# Patient Record
Sex: FEMALE | Race: WHITE | NOT HISPANIC OR LATINO | Employment: FULL TIME | URBAN - METROPOLITAN AREA
[De-identification: names, ages, dates, MRNs, and addresses within clinical notes are randomized per-mention and may not be internally consistent; named-entity substitution may affect disease eponyms.]

---

## 2018-08-26 ENCOUNTER — OFFICE VISIT (OUTPATIENT)
Dept: FAMILY MEDICINE CLINIC | Facility: CLINIC | Age: 63
End: 2018-08-26
Payer: COMMERCIAL

## 2018-08-26 VITALS
SYSTOLIC BLOOD PRESSURE: 120 MMHG | OXYGEN SATURATION: 97 % | TEMPERATURE: 97.5 F | RESPIRATION RATE: 20 BRPM | DIASTOLIC BLOOD PRESSURE: 68 MMHG | HEIGHT: 64 IN | BODY MASS INDEX: 32.83 KG/M2 | WEIGHT: 192.3 LBS | HEART RATE: 80 BPM

## 2018-08-26 DIAGNOSIS — M16.0 PRIMARY OSTEOARTHRITIS OF BOTH HIPS: ICD-10-CM

## 2018-08-26 DIAGNOSIS — M19.132 POST-TRAUMATIC OSTEOARTHRITIS OF LEFT WRIST: ICD-10-CM

## 2018-08-26 DIAGNOSIS — R19.7 DIARRHEA, UNSPECIFIED TYPE: Primary | ICD-10-CM

## 2018-08-26 DIAGNOSIS — E55.9 VITAMIN D DEFICIENCY: ICD-10-CM

## 2018-08-26 DIAGNOSIS — H81.10 BENIGN PAROXYSMAL VERTIGO, UNSPECIFIED LATERALITY: ICD-10-CM

## 2018-08-26 DIAGNOSIS — Z23 NEED FOR INFLUENZA VACCINATION: ICD-10-CM

## 2018-08-26 DIAGNOSIS — K22.70 BARRETT'S ESOPHAGUS WITHOUT DYSPLASIA: ICD-10-CM

## 2018-08-26 DIAGNOSIS — K58.0 IRRITABLE BOWEL SYNDROME WITH DIARRHEA: ICD-10-CM

## 2018-08-26 DIAGNOSIS — M81.0 AGE-RELATED OSTEOPOROSIS WITHOUT CURRENT PATHOLOGICAL FRACTURE: ICD-10-CM

## 2018-08-26 DIAGNOSIS — E89.0 POSTOPERATIVE HYPOTHYROIDISM: ICD-10-CM

## 2018-08-26 PROCEDURE — 1036F TOBACCO NON-USER: CPT | Performed by: FAMILY MEDICINE

## 2018-08-26 PROCEDURE — 90471 IMMUNIZATION ADMIN: CPT | Performed by: FAMILY MEDICINE

## 2018-08-26 PROCEDURE — 99204 OFFICE O/P NEW MOD 45 MIN: CPT | Performed by: FAMILY MEDICINE

## 2018-08-26 PROCEDURE — 90682 RIV4 VACC RECOMBINANT DNA IM: CPT | Performed by: FAMILY MEDICINE

## 2018-08-26 RX ORDER — OMEPRAZOLE 40 MG/1
40 CAPSULE, DELAYED RELEASE ORAL 2 TIMES DAILY
Refills: 0
Start: 2018-08-26 | End: 2019-05-21 | Stop reason: SDUPTHER

## 2018-08-26 RX ORDER — MECLIZINE HYDROCHLORIDE 25 MG/1
25 TABLET ORAL 3 TIMES DAILY PRN
Qty: 30 TABLET | Refills: 0 | Status: SHIPPED | OUTPATIENT
Start: 2018-08-26 | End: 2021-03-24 | Stop reason: SDUPTHER

## 2018-08-26 RX ORDER — MONTELUKAST SODIUM 4 MG/1
TABLET, CHEWABLE ORAL
Refills: 0
Start: 2018-08-26 | End: 2019-04-12 | Stop reason: SDUPTHER

## 2018-08-26 RX ORDER — MELOXICAM 15 MG/1
15 TABLET ORAL DAILY
Refills: 0
Start: 2018-08-26 | End: 2018-12-15 | Stop reason: SDUPTHER

## 2018-08-26 RX ORDER — LOPERAMIDE HYDROCHLORIDE 2 MG/1
CAPSULE ORAL
Qty: 240 CAPSULE | Refills: 0 | Status: SHIPPED | OUTPATIENT
Start: 2018-08-26 | End: 2018-10-08 | Stop reason: SDUPTHER

## 2018-08-26 RX ORDER — IBUPROFEN 200 MG
1 CAPSULE ORAL 2 TIMES DAILY
COMMUNITY
End: 2021-09-27 | Stop reason: ALTCHOICE

## 2018-08-26 RX ORDER — LEVOTHYROXINE SODIUM 0.12 MG/1
125 TABLET ORAL DAILY
COMMUNITY
End: 2019-05-21 | Stop reason: SDUPTHER

## 2018-08-26 RX ORDER — GABAPENTIN 100 MG/1
200 CAPSULE ORAL
Refills: 0
Start: 2018-08-26 | End: 2020-03-14 | Stop reason: DRUGHIGH

## 2018-08-26 RX ORDER — LEVOTHYROXINE SODIUM 112 UG/1
TABLET ORAL
COMMUNITY
End: 2018-12-22 | Stop reason: SDUPTHER

## 2018-08-26 NOTE — PROGRESS NOTES
Assessment/Plan:  Patient is a 66year old female seen to establish in our office  Diagnoses and all orders for this visit:    Diarrhea, unspecified type  -     colestipol (COLESTID) 1 g tablet; Two tabs with every meal  -     loperamide (IMODIUM) 2 mg capsule; Four tabs twice a day as needed    Postoperative hypothyroidism    Age-related osteoporosis without current pathological fracture    Primary osteoarthritis of both hips  -     meloxicam (MOBIC) 15 mg tablet; Take 1 tablet (15 mg total) by mouth daily  -     gabapentin (NEURONTIN) 100 mg capsule; Take 2 capsules (200 mg total) by mouth daily at bedtime    Post-traumatic osteoarthritis of left wrist    Ocampo's esophagus without dysplasia  -     omeprazole (PRILOSEC) 40 MG capsule; Take 1 capsule (40 mg total) by mouth 2 (two) times a day    Vitamin D deficiency  -     cholecalciferol (VITAMIN D3) 23310 units capsule; Take 2 capsules (20,000 Units total) by mouth 2 (two) times a day  -     Vitamin D 25 hydroxy; Future  -     Vitamin D 25 hydroxy    Irritable bowel syndrome with diarrhea  -     rifaximin (XIFAXAN) 550 mg tablet; Take 1 tablet (550 mg total) by mouth every 8 (eight) hours for 90 days    Benign paroxysmal vertigo, unspecified laterality  -     meclizine (ANTIVERT) 25 mg tablet; Take 1 tablet (25 mg total) by mouth 3 (three) times a day as needed for dizziness    Need for influenza vaccination  -     influenza vaccine, [id number], quadrivalent, recombinant, PF, 0 5 mL, for patients 18 yr+ (FLUBLOK)    Other orders  -     Discontinue: Celecoxib (CELEBREX PO); Celebrex  -     Discontinue: Omeprazole (PRILOSEC PO); Take 80 tablets by mouth 2 (two) times a day  -     levothyroxine (SYNTHROID) 112 mcg tablet; Synthroid  -     Multiple Vitamins-Minerals (MULTIVITAMIN ADULT PO); Take by mouth 2 (two) times a day  -     calcium citrate (CALCITRATE) 950 MG tablet; Take 1 tablet by mouth 2 (two) times a day  -     levothyroxine 125 mcg tablet;  Take 125 mcg by mouth daily      I have spent 50 minutes with Patient  today in which greater than 50% of this time was spent in counseling/coordination of care regarding Impressions and taking lengthy history and also patient discussing her opinion on her medical care  She did request a flu shot at the visit  She goes back and forth to  [place] during the week           Subjective:   Chief Complaint   Patient presents with    new patient here to establish        Patient ID: Judy Baez is a 66 y o  female  Patient is here to establish  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 [employer]  She rosales been seeing Dr Arsh Wong at [facility] and he is retiring  She has a complicated medical history  Has history of thyroid disease - had nodule - family history of thyroid cancer - so had thyroid where nodule was removed - was not cancer  She developed Ocampo's esophagus after gastric sleeve surgery  She has Diverticulosis  Since she had Gallbladder surgery - has loose stools all the time - has been on Xifaxin for three months at a time - has seen Dr Luis Alfredo Mcintyre in the past at [facility] -takes colestid with meals and Imodium 4 tabs BID  Dr Farrukh Billingsley - rheumatology at [facility] - osteoporosis - 3 or 4 years - manages DEXA scan  Fam history - heart disease, thyroid, diabetes  Severe allergy to bees  Had reaction to epinephrine at dentist  She can tolerate it  She says that she does not have a reaction to Ephedrine  This was already in [facility]  There is no recordd in care Everywhere  She also has signinvficant arthritis  The following portions of the patient's history were reviewed and updated as appropriate: allergies, current medications, past family history, past medical history, past social history, past surgical history and problem list     Review of Systems   Constitutional: Positive for unexpected weight change (Weight loss secondary to diarrhea)  Gastrointestinal: Positive for diarrhea     Musculoskeletal: Positive for arthralgias and back pain  Psychiatric/Behavioral: Negative  Objective:      /68 (BP Location: Left arm, Patient Position: Sitting, Cuff Size: Large)   Pulse 80   Temp 97 5 °F (36 4 °C) (Tympanic)   Resp 20   Ht 5' 4 17" (1 63 m)   Wt 87 2 kg (192 lb 4 8 oz)   SpO2 97%   Breastfeeding? No   BMI 32 83 kg/m²          Physical Exam   Constitutional: She is oriented to person, place, and time  Neck: No thyromegaly present  Cardiovascular: Normal rate, regular rhythm and normal heart sounds  /68  Patient says this is high for her  Pulmonary/Chest: Effort normal and breath sounds normal    Lymphadenopathy:     She has no cervical adenopathy  Neurological: She is alert and oriented to person, place, and time  Skin: Skin is warm and dry  Nursing note and vitals reviewed

## 2018-10-08 DIAGNOSIS — R19.7 DIARRHEA, UNSPECIFIED TYPE: ICD-10-CM

## 2018-10-11 RX ORDER — LOPERAMIDE HYDROCHLORIDE 2 MG/1
CAPSULE ORAL
Qty: 240 CAPSULE | Refills: 0 | Status: SHIPPED | OUTPATIENT
Start: 2018-10-11 | End: 2018-11-10 | Stop reason: SDUPTHER

## 2018-11-10 DIAGNOSIS — R19.7 DIARRHEA, UNSPECIFIED TYPE: ICD-10-CM

## 2018-11-11 RX ORDER — LOPERAMIDE HYDROCHLORIDE 2 MG/1
CAPSULE ORAL
Qty: 240 CAPSULE | Refills: 0 | Status: SHIPPED | OUTPATIENT
Start: 2018-11-11 | End: 2019-01-25 | Stop reason: SDUPTHER

## 2018-12-15 DIAGNOSIS — M16.0 PRIMARY OSTEOARTHRITIS OF BOTH HIPS: ICD-10-CM

## 2018-12-16 DIAGNOSIS — R19.7 DIARRHEA, UNSPECIFIED TYPE: ICD-10-CM

## 2018-12-16 RX ORDER — MELOXICAM 15 MG/1
15 TABLET ORAL DAILY
Qty: 90 TABLET | Refills: 0
Start: 2018-12-16 | End: 2018-12-22 | Stop reason: SDUPTHER

## 2018-12-17 RX ORDER — LOPERAMIDE HYDROCHLORIDE 2 MG/1
CAPSULE ORAL
Qty: 240 CAPSULE | Refills: 0 | Status: SHIPPED | OUTPATIENT
Start: 2018-12-17 | End: 2019-01-25 | Stop reason: SDUPTHER

## 2018-12-22 DIAGNOSIS — M16.0 PRIMARY OSTEOARTHRITIS OF BOTH HIPS: ICD-10-CM

## 2018-12-22 DIAGNOSIS — E03.9 HYPOTHYROIDISM, UNSPECIFIED TYPE: Primary | ICD-10-CM

## 2018-12-23 RX ORDER — LEVOTHYROXINE SODIUM 112 UG/1
TABLET ORAL
Qty: 45 TABLET | Refills: 1 | Status: SHIPPED | OUTPATIENT
Start: 2018-12-23 | End: 2019-05-21 | Stop reason: SDUPTHER

## 2018-12-23 RX ORDER — MELOXICAM 15 MG/1
TABLET ORAL
Qty: 90 TABLET | Refills: 1 | Status: SHIPPED | OUTPATIENT
Start: 2018-12-23 | End: 2019-05-21 | Stop reason: SDUPTHER

## 2019-01-24 DIAGNOSIS — R19.7 DIARRHEA, UNSPECIFIED TYPE: ICD-10-CM

## 2019-01-25 RX ORDER — LOPERAMIDE HYDROCHLORIDE 2 MG/1
CAPSULE ORAL
Qty: 240 CAPSULE | Refills: 0 | Status: SHIPPED | OUTPATIENT
Start: 2019-01-25 | End: 2019-02-22 | Stop reason: SDUPTHER

## 2019-02-15 ENCOUNTER — OFFICE VISIT (OUTPATIENT)
Dept: FAMILY MEDICINE CLINIC | Facility: CLINIC | Age: 64
End: 2019-02-15
Payer: COMMERCIAL

## 2019-02-15 VITALS
TEMPERATURE: 99.2 F | SYSTOLIC BLOOD PRESSURE: 122 MMHG | BODY MASS INDEX: 32.73 KG/M2 | OXYGEN SATURATION: 97 % | HEIGHT: 64 IN | DIASTOLIC BLOOD PRESSURE: 84 MMHG | HEART RATE: 87 BPM | WEIGHT: 191.7 LBS

## 2019-02-15 DIAGNOSIS — R31.9 HEMATURIA, UNSPECIFIED TYPE: ICD-10-CM

## 2019-02-15 DIAGNOSIS — R30.0 DYSURIA: ICD-10-CM

## 2019-02-15 DIAGNOSIS — R31.9 URINARY TRACT INFECTION WITH HEMATURIA, SITE UNSPECIFIED: Primary | ICD-10-CM

## 2019-02-15 DIAGNOSIS — N39.0 URINARY TRACT INFECTION WITH HEMATURIA, SITE UNSPECIFIED: Primary | ICD-10-CM

## 2019-02-15 LAB
SL AMB  POCT GLUCOSE, UA: ABNORMAL
SL AMB LEUKOCYTE ESTERASE,UA: ABNORMAL
SL AMB POCT BILIRUBIN,UA: ABNORMAL
SL AMB POCT BLOOD,UA: ABNORMAL
SL AMB POCT CLARITY,UA: CLEAR
SL AMB POCT COLOR,UA: YELLOW
SL AMB POCT KETONES,UA: ABNORMAL
SL AMB POCT NITRITE,UA: ABNORMAL
SL AMB POCT PH,UA: 5
SL AMB POCT SPECIFIC GRAVITY,UA: 1.01
SL AMB POCT URINE PROTEIN: ABNORMAL
SL AMB POCT UROBILINOGEN: 0.2

## 2019-02-15 PROCEDURE — 99213 OFFICE O/P EST LOW 20 MIN: CPT | Performed by: NURSE PRACTITIONER

## 2019-02-15 PROCEDURE — 81003 URINALYSIS AUTO W/O SCOPE: CPT | Performed by: NURSE PRACTITIONER

## 2019-02-15 PROCEDURE — 87077 CULTURE AEROBIC IDENTIFY: CPT | Performed by: NURSE PRACTITIONER

## 2019-02-15 PROCEDURE — 87086 URINE CULTURE/COLONY COUNT: CPT | Performed by: NURSE PRACTITIONER

## 2019-02-15 PROCEDURE — 3008F BODY MASS INDEX DOCD: CPT | Performed by: NURSE PRACTITIONER

## 2019-02-15 PROCEDURE — 87186 SC STD MICRODIL/AGAR DIL: CPT | Performed by: NURSE PRACTITIONER

## 2019-02-15 RX ORDER — GABAPENTIN 300 MG/1
300 CAPSULE ORAL
Refills: 1 | COMMUNITY
Start: 2018-12-22 | End: 2020-03-14 | Stop reason: DRUGHIGH

## 2019-02-15 RX ORDER — NITROFURANTOIN 25; 75 MG/1; MG/1
100 CAPSULE ORAL 2 TIMES DAILY
Qty: 10 CAPSULE | Refills: 0 | Status: SHIPPED | OUTPATIENT
Start: 2019-02-15 | End: 2019-02-20

## 2019-02-15 RX ORDER — SODIUM FLUORIDE 5 MG/G
1 GEL, DENTIFRICE DENTAL
COMMUNITY
Start: 2016-05-26

## 2019-02-15 RX ORDER — ACETAMINOPHEN AND CODEINE PHOSPHATE 300; 30 MG/1; MG/1
TABLET ORAL
COMMUNITY
End: 2021-09-27 | Stop reason: ALTCHOICE

## 2019-02-15 RX ORDER — FLUTICASONE PROPIONATE 50 MCG
SPRAY, SUSPENSION (ML) NASAL AS NEEDED
COMMUNITY
Start: 2012-12-07

## 2019-02-15 RX ORDER — DIPHENOXYLATE HYDROCHLORIDE AND ATROPINE SULFATE 2.5; .025 MG/1; MG/1
1 TABLET ORAL 3 TIMES DAILY PRN
COMMUNITY
Start: 2017-02-15 | End: 2020-03-14 | Stop reason: SDUPTHER

## 2019-02-15 NOTE — PROGRESS NOTES
[facility]    ASSESSMENT AND PLAN     1  Urinary tract infection with hematuria, site unspecified  60-year-old female presents today with signs and symptoms suggestive of a urinary tract infection  Physical assessment is as documented below  In office urine dip positive:  +3 leuks, blood, nitrates  Rx given today for 5 day course of nitrofurantoin  Urine sent for culture  Patient will be contacted should results indicate alternate treatment  Symptom management reviewed: Increase fluids, may take OTC Pyridium, Tylenol if needed  Advised patient to be re-evaluated should her symptoms change, persist and/or worsen  - nitrofurantoin (MACROBID) 100 mg capsule; Take 1 capsule (100 mg total) by mouth 2 (two) times a day for 5 days  Dispense: 10 capsule; Refill: 0    2  Dysuria  As above  - Urine culture    3  Hematuria, unspecified type  As above  - POCT urine dip auto non-scope  - Urine culture            SUBJECTIVE       Patient ID: Thony Palma is a 60 y o  female  Chief Complaint   Patient presents with    Difficulty Urinating     x 1 week       HISTORY OF PRESENT ILLNESS    Patient presents today with urinary track infection symptoms  States she has urinary burning, frequency, suprapubic pain  She denies itching  Denies any discharge  Denies fever  Denies any hematuria  Present for 1 week  She has not taken any medication, but states she has been drinking about 3 gal of water a day  The following portions of the patient's history were reviewed and updated as appropriate: allergies, current medications, past medical history and problem list     REVIEW OF SYSTEMS  Review of Systems   Constitutional: Negative  Genitourinary: Positive for decreased urine volume, dysuria, frequency, pelvic pain and urgency  Negative for difficulty urinating, flank pain, genital sores, hematuria and vaginal discharge  Psychiatric/Behavioral: Negative          OBJECTIVE      VITAL SIGNS  BP 122/84 (BP Location: Left arm, Patient Position: Sitting)   Pulse 87   Temp 99 2 °F (37 3 °C) (Tympanic)   Ht 5' 4" (1 626 m)   Wt 87 kg (191 lb 11 2 oz)   SpO2 97%   BMI 32 91 kg/m²     CURRENT MEDICATIONS    Current Outpatient Medications:     calcium citrate (CALCITRATE) 950 MG tablet, Take 1 tablet by mouth 2 (two) times a day, Disp: , Rfl:     cholecalciferol (VITAMIN D3) 63439 units capsule, Take 2 capsules (20,000 Units total) by mouth 2 (two) times a day, Disp: , Rfl: 0    colestipol (COLESTID) 1 g tablet, Two tabs with every meal, Disp: , Rfl: 0    denosumab (PROLIA) 60 mg/mL, Inject 60 mg under the skin, Disp: , Rfl:     diphenoxylate-atropine (LOMOTIL) 2 5-0 025 mg per tablet, Take 1 tablet by mouth Three times daily as needed, Disp: , Rfl:     fluticasone (FLONASE) 50 mcg/act nasal spray, into each nostril, Disp: , Rfl:     gabapentin (NEURONTIN) 100 mg capsule, Take 2 capsules (200 mg total) by mouth daily at bedtime, Disp: , Rfl: 0    gabapentin (NEURONTIN) 300 mg capsule, Take 300 mg by mouth daily at bedtime, Disp: , Rfl: 1    levothyroxine 112 mcg tablet, TAKE 1 TABLET EVERY OTHER DAY, Disp: 45 tablet, Rfl: 1    levothyroxine 125 mcg tablet, Take 125 mcg by mouth daily, Disp: , Rfl:     loperamide (IMODIUM) 2 mg capsule, Take 4 capsules by mouth twice daily as needed, Disp: 240 capsule, Rfl: 0    meclizine (ANTIVERT) 25 mg tablet, Take 1 tablet (25 mg total) by mouth 3 (three) times a day as needed for dizziness, Disp: 30 tablet, Rfl: 0    meloxicam (MOBIC) 15 mg tablet, TAKE 1 TABLET BY MOUTH IN THE MORNING, Disp: 90 tablet, Rfl: 1    Multiple Vitamins-Minerals (MULTIVITAMIN ADULT PO), Take by mouth 2 (two) times a day, Disp: , Rfl:     omeprazole (PRILOSEC) 40 MG capsule, Take 1 capsule (40 mg total) by mouth 2 (two) times a day, Disp: , Rfl: 0    rifaximin (XIFAXAN) 550 mg tablet, 1 tablet, Disp: , Rfl:     SODIUM FLUORIDE, DENTAL GEL, (PREVIDENT) 1 1 % GEL, Take 1 application by mouth, Disp: , Rfl:     acetaminophen-codeine (TYLENOL/CODEINE #3) 300-30 MG per tablet, Tylenol-Codeine #3 300 mg-30 mg tablet  Take 1-2 tablets by oral route every 4-6 hours as needed FOR POST OP PAIN, Disp: , Rfl:     nitrofurantoin (MACROBID) 100 mg capsule, Take 1 capsule (100 mg total) by mouth 2 (two) times a day for 5 days, Disp: 10 capsule, Rfl: 0      PHYSICAL EXAMINATION   Physical Exam   Constitutional: She appears well-developed and well-nourished  Abdominal:   Patient with stated pain in her suprapubic region   Psychiatric: She has a normal mood and affect  Her speech is normal and behavior is normal  Judgment and thought content normal  Cognition and memory are normal    Nursing note and vitals reviewed

## 2019-02-16 PROBLEM — R42 DIZZINESS: Status: ACTIVE | Noted: 2017-05-04

## 2019-02-16 PROBLEM — M48.061 LUMBAR SPINAL STENOSIS: Status: ACTIVE | Noted: 2017-05-22

## 2019-02-16 PROBLEM — G56.00 CARPAL TUNNEL SYNDROME: Status: ACTIVE | Noted: 2019-02-16

## 2019-02-17 LAB — BACTERIA UR CULT: ABNORMAL

## 2019-02-22 ENCOUNTER — OFFICE VISIT (OUTPATIENT)
Dept: FAMILY MEDICINE CLINIC | Facility: CLINIC | Age: 64
End: 2019-02-22
Payer: COMMERCIAL

## 2019-02-22 VITALS
OXYGEN SATURATION: 97 % | HEIGHT: 64 IN | SYSTOLIC BLOOD PRESSURE: 132 MMHG | TEMPERATURE: 98.9 F | BODY MASS INDEX: 32.95 KG/M2 | DIASTOLIC BLOOD PRESSURE: 62 MMHG | RESPIRATION RATE: 18 BRPM | WEIGHT: 193 LBS | HEART RATE: 88 BPM

## 2019-02-22 DIAGNOSIS — R19.7 DIARRHEA, UNSPECIFIED TYPE: ICD-10-CM

## 2019-02-22 DIAGNOSIS — K22.70 BARRETT'S ESOPHAGUS WITHOUT DYSPLASIA: ICD-10-CM

## 2019-02-22 DIAGNOSIS — E89.0 POSTOPERATIVE HYPOTHYROIDISM: ICD-10-CM

## 2019-02-22 DIAGNOSIS — K58.0 IRRITABLE BOWEL SYNDROME WITH DIARRHEA: ICD-10-CM

## 2019-02-22 DIAGNOSIS — N30.00 ACUTE CYSTITIS WITHOUT HEMATURIA: Primary | ICD-10-CM

## 2019-02-22 LAB
SL AMB  POCT GLUCOSE, UA: NEGATIVE
SL AMB LEUKOCYTE ESTERASE,UA: ABNORMAL
SL AMB POCT BILIRUBIN,UA: NEGATIVE
SL AMB POCT BLOOD,UA: ABNORMAL
SL AMB POCT CLARITY,UA: ABNORMAL
SL AMB POCT COLOR,UA: YELLOW
SL AMB POCT KETONES,UA: NEGATIVE
SL AMB POCT NITRITE,UA: ABNORMAL
SL AMB POCT PH,UA: 5.5
SL AMB POCT SPECIFIC GRAVITY,UA: 1.01
SL AMB POCT URINE PROTEIN: ABNORMAL
SL AMB POCT UROBILINOGEN: 0.2

## 2019-02-22 PROCEDURE — 99214 OFFICE O/P EST MOD 30 MIN: CPT | Performed by: FAMILY MEDICINE

## 2019-02-22 PROCEDURE — 81003 URINALYSIS AUTO W/O SCOPE: CPT | Performed by: FAMILY MEDICINE

## 2019-02-22 PROCEDURE — 87077 CULTURE AEROBIC IDENTIFY: CPT | Performed by: FAMILY MEDICINE

## 2019-02-22 PROCEDURE — 87186 SC STD MICRODIL/AGAR DIL: CPT | Performed by: FAMILY MEDICINE

## 2019-02-22 PROCEDURE — 87086 URINE CULTURE/COLONY COUNT: CPT | Performed by: FAMILY MEDICINE

## 2019-02-22 RX ORDER — LOPERAMIDE HYDROCHLORIDE 2 MG/1
CAPSULE ORAL
Qty: 240 CAPSULE | Refills: 0 | Status: SHIPPED | OUTPATIENT
Start: 2019-02-22 | End: 2019-03-25 | Stop reason: SDUPTHER

## 2019-02-22 RX ORDER — CIPROFLOXACIN 500 MG/1
500 TABLET, FILM COATED ORAL EVERY 12 HOURS SCHEDULED
Qty: 14 TABLET | Refills: 0 | Status: SHIPPED | OUTPATIENT
Start: 2019-02-22 | End: 2019-03-01

## 2019-02-22 RX ORDER — PHENAZOPYRIDINE HYDROCHLORIDE 200 MG/1
200 TABLET, FILM COATED ORAL
Qty: 15 TABLET | Refills: 0 | Status: SHIPPED | OUTPATIENT
Start: 2019-02-22 | End: 2019-09-28 | Stop reason: ALTCHOICE

## 2019-02-22 NOTE — PROGRESS NOTES
Assessment/Plan:  Patient is 51-year-old female seen for follow-up of hypothyroidism, irritable bowel  She was recently seen for urinary tract infection and is still with symptoms  She has been taking peridium  We will send her urine off for culture and in the meantime I started her on a 7 day course of ciprofloxacin  We will call her with her culture results  Diagnoses and all orders for this visit:    Acute cystitis without hematuria  -     POCT urine dip auto non-scope  -     Urine culture  -     ciprofloxacin (CIPRO) 500 mg tablet; Take 1 tablet (500 mg total) by mouth every 12 (twelve) hours for 7 days  -     phenazopyridine (PYRIDIUM) 200 mg tablet; Take 1 tablet (200 mg total) by mouth 3 (three) times a day with meals    Ocampo's esophagus without dysplasia    Irritable bowel syndrome with diarrhea    Postoperative hypothyroidism          Subjective:   Chief Complaint   Patient presents with    Follow-up     6m check         Patient ID: Tona Card is a 60 y o  female  Patient is here for follow up of chronic medical conditions  Patient was recently on [address] for urine infection  She still has symptoms  Patient with irritable bowel - she takes Imodium and had 8 instead of 16 BM's a day  She is due for a scope for her Barrets Esophagus  The following portions of the patient's history were reviewed and updated as appropriate: allergies, current medications, past family history, past medical history, past social history, past surgical history and problem list     Review of Systems   Constitutional: Negative for chills and fever  HENT: Negative for congestion and sore throat  Respiratory: Negative for chest tightness  Cardiovascular: Negative for chest pain and palpitations  Gastrointestinal: Positive for diarrhea  Negative for abdominal pain, constipation and nausea  Genitourinary: Positive for difficulty urinating and dysuria  Skin: Negative      Neurological: Negative for dizziness and headaches  Psychiatric/Behavioral: Negative  Objective:      /62 (BP Location: Left arm, Patient Position: Sitting, Cuff Size: Large)   Pulse 88   Temp 98 9 °F (37 2 °C) (Tympanic)   Resp 18   Ht 5' 4 37" (1 635 m)   Wt 87 5 kg (193 lb)   SpO2 97%   Breastfeeding? No   BMI 32 75 kg/m²          Physical Exam   Constitutional: She is oriented to person, place, and time  She appears well-developed  No distress  Neck: Carotid bruit is not present  No thyromegaly present  Cardiovascular: Normal rate, regular rhythm and normal heart sounds  Pulmonary/Chest: Effort normal and breath sounds normal    Musculoskeletal: She exhibits no edema  Lymphadenopathy:     She has no cervical adenopathy  Neurological: She is alert and oriented to person, place, and time  Skin: Skin is warm and dry  Psychiatric: She has a normal mood and affect  Nursing note and vitals reviewed

## 2019-02-25 DIAGNOSIS — N30.00 ACUTE CYSTITIS WITHOUT HEMATURIA: Primary | ICD-10-CM

## 2019-02-25 LAB — BACTERIA UR CULT: ABNORMAL

## 2019-03-04 DIAGNOSIS — R30.0 DYSURIA: Primary | ICD-10-CM

## 2019-03-10 ENCOUNTER — OFFICE VISIT (OUTPATIENT)
Dept: FAMILY MEDICINE CLINIC | Facility: CLINIC | Age: 64
End: 2019-03-10
Payer: COMMERCIAL

## 2019-03-10 DIAGNOSIS — R30.0 DYSURIA: Primary | ICD-10-CM

## 2019-03-10 LAB
SL AMB  POCT GLUCOSE, UA: ABNORMAL
SL AMB LEUKOCYTE ESTERASE,UA: ABNORMAL
SL AMB POCT BILIRUBIN,UA: ABNORMAL
SL AMB POCT BLOOD,UA: ABNORMAL
SL AMB POCT CLARITY,UA: CLEAR
SL AMB POCT COLOR,UA: YELLOW
SL AMB POCT KETONES,UA: ABNORMAL
SL AMB POCT NITRITE,UA: ABNORMAL
SL AMB POCT PH,UA: 5
SL AMB POCT SPECIFIC GRAVITY,UA: 1
SL AMB POCT URINE PROTEIN: ABNORMAL
SL AMB POCT UROBILINOGEN: 0.2

## 2019-03-10 PROCEDURE — 87186 SC STD MICRODIL/AGAR DIL: CPT

## 2019-03-10 PROCEDURE — 87086 URINE CULTURE/COLONY COUNT: CPT

## 2019-03-10 PROCEDURE — 87077 CULTURE AEROBIC IDENTIFY: CPT

## 2019-03-10 PROCEDURE — 81003 URINALYSIS AUTO W/O SCOPE: CPT | Performed by: FAMILY MEDICINE

## 2019-03-14 LAB — BACTERIA UR CULT: ABNORMAL

## 2019-03-25 DIAGNOSIS — R19.7 DIARRHEA, UNSPECIFIED TYPE: ICD-10-CM

## 2019-03-25 RX ORDER — LOPERAMIDE HYDROCHLORIDE 2 MG/1
CAPSULE ORAL
Qty: 240 CAPSULE | Refills: 0 | Status: SHIPPED | OUTPATIENT
Start: 2019-03-25 | End: 2019-06-21 | Stop reason: SDUPTHER

## 2019-04-12 DIAGNOSIS — R19.7 DIARRHEA, UNSPECIFIED TYPE: ICD-10-CM

## 2019-04-12 RX ORDER — MONTELUKAST SODIUM 4 MG/1
TABLET, CHEWABLE ORAL
Qty: 720 TABLET | Refills: 0 | Status: SHIPPED | OUTPATIENT
Start: 2019-04-12 | End: 2019-05-21 | Stop reason: SDUPTHER

## 2019-04-15 DIAGNOSIS — R19.7 DIARRHEA, UNSPECIFIED TYPE: ICD-10-CM

## 2019-04-27 DIAGNOSIS — R19.7 DIARRHEA, UNSPECIFIED TYPE: ICD-10-CM

## 2019-04-29 RX ORDER — LOPERAMIDE HYDROCHLORIDE 2 MG/1
CAPSULE ORAL
Qty: 240 CAPSULE | Refills: 0 | Status: SHIPPED | OUTPATIENT
Start: 2019-04-29 | End: 2019-05-17 | Stop reason: SDUPTHER

## 2019-05-17 DIAGNOSIS — R19.7 DIARRHEA, UNSPECIFIED TYPE: ICD-10-CM

## 2019-05-17 RX ORDER — LOPERAMIDE HYDROCHLORIDE 2 MG/1
CAPSULE ORAL
Qty: 240 CAPSULE | Refills: 0 | Status: SHIPPED | OUTPATIENT
Start: 2019-05-17 | End: 2019-06-21 | Stop reason: SDUPTHER

## 2019-05-21 DIAGNOSIS — M16.0 PRIMARY OSTEOARTHRITIS OF BOTH HIPS: ICD-10-CM

## 2019-05-21 DIAGNOSIS — E89.0 POSTOPERATIVE HYPOTHYROIDISM: Primary | ICD-10-CM

## 2019-05-21 DIAGNOSIS — R19.7 DIARRHEA, UNSPECIFIED TYPE: ICD-10-CM

## 2019-05-21 DIAGNOSIS — E03.9 HYPOTHYROIDISM, UNSPECIFIED TYPE: ICD-10-CM

## 2019-05-21 DIAGNOSIS — K22.70 BARRETT'S ESOPHAGUS WITHOUT DYSPLASIA: ICD-10-CM

## 2019-05-21 RX ORDER — LEVOTHYROXINE SODIUM 112 UG/1
112 TABLET ORAL EVERY OTHER DAY
Qty: 45 TABLET | Refills: 1 | Status: SHIPPED | OUTPATIENT
Start: 2019-05-21 | End: 2019-05-27

## 2019-05-21 RX ORDER — OMEPRAZOLE 40 MG/1
40 CAPSULE, DELAYED RELEASE ORAL 2 TIMES DAILY
Qty: 180 CAPSULE | Refills: 1 | Status: SHIPPED | OUTPATIENT
Start: 2019-05-21 | End: 2019-05-29 | Stop reason: SDUPTHER

## 2019-05-21 RX ORDER — LEVOTHYROXINE SODIUM 0.12 MG/1
125 TABLET ORAL DAILY
Qty: 90 TABLET | Refills: 1 | Status: SHIPPED | OUTPATIENT
Start: 2019-05-21 | End: 2019-05-27

## 2019-05-21 RX ORDER — MONTELUKAST SODIUM 4 MG/1
1 TABLET, CHEWABLE ORAL 2 TIMES DAILY
Qty: 180 TABLET | Refills: 1 | Status: SHIPPED | OUTPATIENT
Start: 2019-05-21 | End: 2019-05-24 | Stop reason: SDUPTHER

## 2019-05-21 RX ORDER — MELOXICAM 15 MG/1
15 TABLET ORAL EVERY MORNING
Qty: 90 TABLET | Refills: 1 | Status: SHIPPED | OUTPATIENT
Start: 2019-05-21 | End: 2019-06-10 | Stop reason: SDUPTHER

## 2019-05-24 DIAGNOSIS — R19.7 DIARRHEA, UNSPECIFIED TYPE: ICD-10-CM

## 2019-05-24 RX ORDER — MONTELUKAST SODIUM 4 MG/1
2 TABLET, CHEWABLE ORAL
Qty: 540 TABLET | Refills: 1 | Status: SHIPPED | OUTPATIENT
Start: 2019-05-24 | End: 2020-01-29

## 2019-05-27 DIAGNOSIS — E03.9 HYPOTHYROIDISM, UNSPECIFIED TYPE: ICD-10-CM

## 2019-05-27 DIAGNOSIS — E89.0 POSTOPERATIVE HYPOTHYROIDISM: ICD-10-CM

## 2019-05-27 RX ORDER — LEVOTHYROXINE SODIUM 0.12 MG/1
TABLET ORAL
Qty: 90 TABLET | Refills: 1
Start: 2019-05-27 | End: 2020-09-11 | Stop reason: SDUPTHER

## 2019-05-27 RX ORDER — LEVOTHYROXINE SODIUM 112 UG/1
112 TABLET ORAL EVERY OTHER DAY
Qty: 45 TABLET | Refills: 1
Start: 2019-05-27 | End: 2019-11-16 | Stop reason: SDUPTHER

## 2019-05-28 DIAGNOSIS — K58.0 IRRITABLE BOWEL SYNDROME WITH DIARRHEA: Primary | ICD-10-CM

## 2019-05-29 DIAGNOSIS — K22.70 BARRETT'S ESOPHAGUS WITHOUT DYSPLASIA: ICD-10-CM

## 2019-05-29 RX ORDER — OMEPRAZOLE 40 MG/1
40 CAPSULE, DELAYED RELEASE ORAL 2 TIMES DAILY
Qty: 180 CAPSULE | Refills: 1 | Status: SHIPPED | OUTPATIENT
Start: 2019-05-29 | End: 2019-11-26 | Stop reason: SDUPTHER

## 2019-06-10 DIAGNOSIS — E03.9 HYPOTHYROIDISM, UNSPECIFIED TYPE: ICD-10-CM

## 2019-06-10 DIAGNOSIS — M16.0 PRIMARY OSTEOARTHRITIS OF BOTH HIPS: ICD-10-CM

## 2019-06-10 RX ORDER — LEVOTHYROXINE SODIUM 112 UG/1
TABLET ORAL
Qty: 45 TABLET | Refills: 1 | Status: SHIPPED | OUTPATIENT
Start: 2019-06-10 | End: 2019-09-28 | Stop reason: SDUPTHER

## 2019-06-10 RX ORDER — MELOXICAM 15 MG/1
TABLET ORAL
Qty: 90 TABLET | Refills: 1 | Status: SHIPPED | OUTPATIENT
Start: 2019-06-10 | End: 2019-11-16 | Stop reason: SDUPTHER

## 2019-06-21 DIAGNOSIS — R19.7 DIARRHEA, UNSPECIFIED TYPE: ICD-10-CM

## 2019-06-21 RX ORDER — LOPERAMIDE HYDROCHLORIDE 2 MG/1
CAPSULE ORAL
Qty: 240 CAPSULE | Refills: 0 | Status: SHIPPED | OUTPATIENT
Start: 2019-06-21 | End: 2019-07-29 | Stop reason: SDUPTHER

## 2019-07-05 DIAGNOSIS — R19.7 DIARRHEA, UNSPECIFIED TYPE: ICD-10-CM

## 2019-07-07 RX ORDER — MONTELUKAST SODIUM 4 MG/1
TABLET, CHEWABLE ORAL
Qty: 720 TABLET | Refills: 0 | Status: SHIPPED | OUTPATIENT
Start: 2019-07-07 | End: 2019-09-28 | Stop reason: SDUPTHER

## 2019-07-29 DIAGNOSIS — R19.7 DIARRHEA, UNSPECIFIED TYPE: ICD-10-CM

## 2019-07-29 RX ORDER — LOPERAMIDE HYDROCHLORIDE 2 MG/1
8 CAPSULE ORAL 2 TIMES DAILY
Qty: 240 CAPSULE | Refills: 0 | Status: SHIPPED | OUTPATIENT
Start: 2019-07-29 | End: 2019-11-13 | Stop reason: ALTCHOICE

## 2019-08-31 DIAGNOSIS — R19.7 DIARRHEA, UNSPECIFIED TYPE: ICD-10-CM

## 2019-08-31 RX ORDER — LOPERAMIDE HYDROCHLORIDE 2 MG/1
CAPSULE ORAL
Qty: 240 CAPSULE | Refills: 0 | Status: SHIPPED | OUTPATIENT
Start: 2019-08-31 | End: 2019-09-28 | Stop reason: SDUPTHER

## 2019-09-09 ENCOUNTER — TELEPHONE (OUTPATIENT)
Dept: FAMILY MEDICINE CLINIC | Facility: CLINIC | Age: 64
End: 2019-09-09

## 2019-09-13 DIAGNOSIS — E89.0 POSTOPERATIVE HYPOTHYROIDISM: ICD-10-CM

## 2019-09-13 DIAGNOSIS — K63.89 BACTERIAL OVERGROWTH SYNDROME: Primary | ICD-10-CM

## 2019-09-18 NOTE — TELEPHONE ENCOUNTER
I spoke to patient last evening  I'm sorry, I forgot to put a note in the chart  She told me [facility] for the lab  So I put 2 different orders in the computer  I guess you can fax 1 to [facility] and if she goes to [address] [place] [address] they will see the other order I put in the computer

## 2019-09-21 ENCOUNTER — APPOINTMENT (OUTPATIENT)
Dept: LAB | Facility: CLINIC | Age: 64
End: 2019-09-21
Payer: COMMERCIAL

## 2019-09-21 DIAGNOSIS — E89.0 POSTOPERATIVE HYPOTHYROIDISM: ICD-10-CM

## 2019-09-21 LAB — TSH SERPL DL<=0.05 MIU/L-ACNC: 2.5 UIU/ML (ref 0.36–3.74)

## 2019-09-21 PROCEDURE — 84443 ASSAY THYROID STIM HORMONE: CPT

## 2019-09-21 PROCEDURE — 36415 COLL VENOUS BLD VENIPUNCTURE: CPT

## 2019-09-28 ENCOUNTER — OFFICE VISIT (OUTPATIENT)
Dept: FAMILY MEDICINE CLINIC | Facility: CLINIC | Age: 64
End: 2019-09-28
Payer: COMMERCIAL

## 2019-09-28 VITALS
TEMPERATURE: 97 F | RESPIRATION RATE: 17 BRPM | OXYGEN SATURATION: 97 % | DIASTOLIC BLOOD PRESSURE: 60 MMHG | SYSTOLIC BLOOD PRESSURE: 108 MMHG | BODY MASS INDEX: 35.17 KG/M2 | HEIGHT: 64 IN | WEIGHT: 206 LBS | HEART RATE: 79 BPM

## 2019-09-28 DIAGNOSIS — E89.0 POSTOPERATIVE HYPOTHYROIDISM: Primary | ICD-10-CM

## 2019-09-28 DIAGNOSIS — E04.1 THYROID NODULE: ICD-10-CM

## 2019-09-28 DIAGNOSIS — K63.89 BACTERIAL OVERGROWTH SYNDROME: ICD-10-CM

## 2019-09-28 DIAGNOSIS — Z23 NEED FOR ZOSTER VACCINATION: ICD-10-CM

## 2019-09-28 PROCEDURE — 90471 IMMUNIZATION ADMIN: CPT | Performed by: FAMILY MEDICINE

## 2019-09-28 PROCEDURE — 99214 OFFICE O/P EST MOD 30 MIN: CPT | Performed by: FAMILY MEDICINE

## 2019-09-28 PROCEDURE — 90750 HZV VACC RECOMBINANT IM: CPT | Performed by: FAMILY MEDICINE

## 2019-09-28 PROCEDURE — 3008F BODY MASS INDEX DOCD: CPT | Performed by: FAMILY MEDICINE

## 2019-09-28 NOTE — PROGRESS NOTES
Assessment/Plan:    Patient is 81-year-old female seen for follow-up of chronic medical conditions  1  Postoperative hypothyroidism    Patient is taking alternate dosing of levothyroxine 112 and  125 mcg every other day  - Comprehensive metabolic panel; Future  - Lipid Panel with Direct LDL reflex; Future  - CBC and differential; Future    2  Need for zoster vaccination   she was giving her for shingles vaccine today  She is to schedule her 2nd 1 on the way out   - Zoster Vaccine Recombinant IM    3  Thyroid nodule    I have ordered an ultrasound thyroid to check nodule on the right  She had a left thyroidectomy  - US thyroid; Future  - Comprehensive metabolic panel; Future  - Lipid Panel with Direct LDL reflex; Future  - CBC and differential; Future    4  Bacterial overgrowth syndrome    Patient continues on Xifaxan per gastroenterology  - rifaximin (XIFAXAN) 550 mg tablet; Take 1 tablet (550 mg total) by mouth 4 (four) times a day  Dispense: 360 tablet; Refill: 0    5  BMI 35 0-35 9,adult    Patient travels back and forth to [place] for work  Finds little time to exercise and does not eat regularly scheduled meals  Diagnoses and all orders for this visit:    Postoperative hypothyroidism  -     Comprehensive metabolic panel; Future  -     Lipid Panel with Direct LDL reflex; Future  -     CBC and differential; Future    Need for zoster vaccination  -     Zoster Vaccine Recombinant IM    Thyroid nodule  -     US thyroid; Future  -     Comprehensive metabolic panel; Future  -     Lipid Panel with Direct LDL reflex; Future  -     CBC and differential; Future    Bacterial overgrowth syndrome  -     rifaximin (XIFAXAN) 550 mg tablet; Take 1 tablet (550 mg total) by mouth 4 (four) times a day    BMI 35 0-35 9,adult          Subjective:   Chief Complaint   Patient presents with    Follow-up     6M & BW Review/ 1st Shingrix        Patient ID: Keshia Feldman is a 60 y o  female      Patient   Is seen for follow-up of chronic medical conditions  We prescribe her thyroid medication and she takes Xifaxan for bacterial overgrowth  She recently saw her GI doctor and had endoscopy  which showed Ocampo's esophagus  She needs to stay on Omeprazole  Twice a day and this increases her issues with GI motility  She takes Xifaxan off and on because of diarrhea  - takes one to two weeks depending how she is doing and goes off a week or so  Also had hip injections with Dr Chirinos  The following portions of the patient's history were reviewed and updated as appropriate: allergies, current medications, past family history, past medical history, past social history, past surgical history and problem list     Review of Systems   Constitutional: Negative for chills and fever  HENT: Negative for congestion and sore throat  Respiratory: Negative for chest tightness  Cardiovascular: Negative for chest pain and palpitations  Gastrointestinal: Positive for diarrhea  Negative for abdominal pain, constipation and nausea  Genitourinary: Negative for difficulty urinating  Skin: Negative  Neurological: Negative for dizziness and headaches  Psychiatric/Behavioral: Negative  Objective:      /60 (BP Location: Left arm, Patient Position: Sitting, Cuff Size: Standard)   Pulse 79   Temp (!) 97 °F (36 1 °C) (Tympanic)   Resp 17   Ht 5' 4" (1 626 m)   Wt 93 4 kg (206 lb)   SpO2 97%   BMI 35 36 kg/m²          Physical Exam   Constitutional: She is oriented to person, place, and time  She appears well-developed  No distress  Obese  Neck: Carotid bruit is not present  No thyromegaly present  Cardiovascular: Normal rate, regular rhythm and normal heart sounds  /74   Pulmonary/Chest: Effort normal and breath sounds normal    Musculoskeletal: She exhibits no edema  Lymphadenopathy:     She has no cervical adenopathy  Neurological: She is alert and oriented to person, place, and time  Skin: Skin is warm and dry  Psychiatric: She has a normal mood and affect  Nursing note and vitals reviewed  BMI Counseling: Body mass index is 35 36 kg/m²  The BMI is above normal  Nutrition recommendations include reducing portion sizes and moderation in carbohydrate intake  Exercise recommendations include moderate aerobic physical activity for 150 minutes/week

## 2019-09-29 PROBLEM — K22.710 BARRETT'S ESOPHAGUS WITH LOW GRADE DYSPLASIA: Status: ACTIVE | Noted: 2019-03-27

## 2019-09-29 PROBLEM — R42 DIZZINESS: Status: RESOLVED | Noted: 2017-05-04 | Resolved: 2019-09-29

## 2019-10-02 DIAGNOSIS — K63.89 BACTERIAL OVERGROWTH SYNDROME: ICD-10-CM

## 2019-10-05 ENCOUNTER — APPOINTMENT (OUTPATIENT)
Dept: LAB | Facility: CLINIC | Age: 64
End: 2019-10-05
Payer: COMMERCIAL

## 2019-10-05 DIAGNOSIS — E04.1 THYROID NODULE: ICD-10-CM

## 2019-10-05 DIAGNOSIS — E89.0 POSTOPERATIVE HYPOTHYROIDISM: ICD-10-CM

## 2019-10-05 LAB
ALBUMIN SERPL BCP-MCNC: 3.2 G/DL (ref 3.5–5)
ALP SERPL-CCNC: 51 U/L (ref 46–116)
ALT SERPL W P-5'-P-CCNC: 29 U/L (ref 12–78)
ANION GAP SERPL CALCULATED.3IONS-SCNC: 7 MMOL/L (ref 4–13)
AST SERPL W P-5'-P-CCNC: 19 U/L (ref 5–45)
BASOPHILS # BLD AUTO: 0.04 THOUSANDS/ΜL (ref 0–0.1)
BASOPHILS NFR BLD AUTO: 1 % (ref 0–1)
BILIRUB SERPL-MCNC: 0.57 MG/DL (ref 0.2–1)
BUN SERPL-MCNC: 20 MG/DL (ref 5–25)
CALCIUM SERPL-MCNC: 8.6 MG/DL (ref 8.3–10.1)
CHLORIDE SERPL-SCNC: 110 MMOL/L (ref 100–108)
CHOLEST SERPL-MCNC: 155 MG/DL (ref 50–200)
CO2 SERPL-SCNC: 26 MMOL/L (ref 21–32)
CREAT SERPL-MCNC: 0.56 MG/DL (ref 0.6–1.3)
EOSINOPHIL # BLD AUTO: 0.23 THOUSAND/ΜL (ref 0–0.61)
EOSINOPHIL NFR BLD AUTO: 3 % (ref 0–6)
ERYTHROCYTE [DISTWIDTH] IN BLOOD BY AUTOMATED COUNT: 13.8 % (ref 11.6–15.1)
GFR SERPL CREATININE-BSD FRML MDRD: 100 ML/MIN/1.73SQ M
GLUCOSE P FAST SERPL-MCNC: 69 MG/DL (ref 65–99)
HCT VFR BLD AUTO: 39.4 % (ref 34.8–46.1)
HDLC SERPL-MCNC: 66 MG/DL (ref 40–60)
HGB BLD-MCNC: 12.1 G/DL (ref 11.5–15.4)
IMM GRANULOCYTES # BLD AUTO: 0.02 THOUSAND/UL (ref 0–0.2)
IMM GRANULOCYTES NFR BLD AUTO: 0 % (ref 0–2)
LDLC SERPL CALC-MCNC: 74 MG/DL (ref 0–100)
LYMPHOCYTES # BLD AUTO: 2.87 THOUSANDS/ΜL (ref 0.6–4.47)
LYMPHOCYTES NFR BLD AUTO: 38 % (ref 14–44)
MCH RBC QN AUTO: 30 PG (ref 26.8–34.3)
MCHC RBC AUTO-ENTMCNC: 30.7 G/DL (ref 31.4–37.4)
MCV RBC AUTO: 98 FL (ref 82–98)
MONOCYTES # BLD AUTO: 0.7 THOUSAND/ΜL (ref 0.17–1.22)
MONOCYTES NFR BLD AUTO: 9 % (ref 4–12)
NEUTROPHILS # BLD AUTO: 3.69 THOUSANDS/ΜL (ref 1.85–7.62)
NEUTS SEG NFR BLD AUTO: 49 % (ref 43–75)
NRBC BLD AUTO-RTO: 0 /100 WBCS
PLATELET # BLD AUTO: 212 THOUSANDS/UL (ref 149–390)
PMV BLD AUTO: 12.5 FL (ref 8.9–12.7)
POTASSIUM SERPL-SCNC: 3.6 MMOL/L (ref 3.5–5.3)
PROT SERPL-MCNC: 6.4 G/DL (ref 6.4–8.2)
RBC # BLD AUTO: 4.04 MILLION/UL (ref 3.81–5.12)
SODIUM SERPL-SCNC: 143 MMOL/L (ref 136–145)
TRIGL SERPL-MCNC: 76 MG/DL
WBC # BLD AUTO: 7.55 THOUSAND/UL (ref 4.31–10.16)

## 2019-10-05 PROCEDURE — 85025 COMPLETE CBC W/AUTO DIFF WBC: CPT

## 2019-10-05 PROCEDURE — 80053 COMPREHEN METABOLIC PANEL: CPT

## 2019-10-05 PROCEDURE — 36415 COLL VENOUS BLD VENIPUNCTURE: CPT

## 2019-10-05 PROCEDURE — 80061 LIPID PANEL: CPT

## 2019-10-09 ENCOUNTER — TELEPHONE (OUTPATIENT)
Dept: FAMILY MEDICINE CLINIC | Facility: CLINIC | Age: 64
End: 2019-10-09

## 2019-10-09 NOTE — TELEPHONE ENCOUNTER
I LM YESTERDAY FOR PT,  WE RECEIVED A FAX FROM PTS MAIL ORDER ASKING US TO CLARIFY THE XIFAXAN RX   IT APPEARS THAT WITH THE CURRENT DIRECTIONS IT EXCEEDS THE MAXIMUM RECOMMENDED DOSE  PHARMACY IS SAYING 9940 MG IS WHAT THE  RECOMMENDS FOR DOSING AND NOT 2200  I SPOKE TO KYLE YESTERDAY SINCE JIMMY IS NOT HERE  I WAS TOLD TO CALL THE PT AND LET HER KNOW THAT WE WILL SEND A NEW RX FOR THE 3 PILLS A DAY IF SHE NEEDS IT NOW  OTHERWISE THIS WILL NEED TO WAIT FOR Clarita Sawyer  I PUT THE REQUEST ON JIMMY'S DESK

## 2019-10-14 DIAGNOSIS — R19.7 DIARRHEA, UNSPECIFIED TYPE: ICD-10-CM

## 2019-10-14 RX ORDER — LOPERAMIDE HYDROCHLORIDE 2 MG/1
CAPSULE ORAL
Qty: 240 CAPSULE | Refills: 0 | Status: SHIPPED | OUTPATIENT
Start: 2019-10-14 | End: 2019-11-13 | Stop reason: SDUPTHER

## 2019-10-24 ENCOUNTER — TELEPHONE (OUTPATIENT)
Dept: FAMILY MEDICINE CLINIC | Facility: CLINIC | Age: 64
End: 2019-10-24

## 2019-10-24 NOTE — TELEPHONE ENCOUNTER
Doreen Urbano from [facility] called and asked if we could fax over Pt referral [address]      Faxed to [phone]

## 2019-11-13 DIAGNOSIS — R19.7 DIARRHEA, UNSPECIFIED TYPE: ICD-10-CM

## 2019-11-13 RX ORDER — LOPERAMIDE HYDROCHLORIDE 2 MG/1
CAPSULE ORAL
Qty: 240 CAPSULE | Refills: 0 | Status: SHIPPED | OUTPATIENT
Start: 2019-11-13 | End: 2019-12-20 | Stop reason: SDUPTHER

## 2019-11-16 DIAGNOSIS — E03.9 HYPOTHYROIDISM, UNSPECIFIED TYPE: ICD-10-CM

## 2019-11-16 DIAGNOSIS — M16.0 PRIMARY OSTEOARTHRITIS OF BOTH HIPS: ICD-10-CM

## 2019-11-16 RX ORDER — MELOXICAM 15 MG/1
TABLET ORAL
Qty: 90 TABLET | Refills: 0 | Status: SHIPPED | OUTPATIENT
Start: 2019-11-16 | End: 2020-02-15

## 2019-11-16 RX ORDER — LEVOTHYROXINE SODIUM 112 UG/1
TABLET ORAL
Qty: 45 TABLET | Refills: 0 | Status: SHIPPED | OUTPATIENT
Start: 2019-11-16 | End: 2020-02-15

## 2019-11-26 DIAGNOSIS — K22.70 BARRETT'S ESOPHAGUS WITHOUT DYSPLASIA: ICD-10-CM

## 2019-11-26 RX ORDER — OMEPRAZOLE 40 MG/1
CAPSULE, DELAYED RELEASE ORAL
Qty: 180 CAPSULE | Refills: 4 | Status: SHIPPED | OUTPATIENT
Start: 2019-11-26 | End: 2021-02-18

## 2019-12-06 ENCOUNTER — CLINICAL SUPPORT (OUTPATIENT)
Dept: FAMILY MEDICINE CLINIC | Facility: CLINIC | Age: 64
End: 2019-12-06
Payer: COMMERCIAL

## 2019-12-06 DIAGNOSIS — Z12.39 SCREENING FOR BREAST CANCER: ICD-10-CM

## 2019-12-06 DIAGNOSIS — Z23 NEED FOR VACCINATION: Primary | ICD-10-CM

## 2019-12-06 PROCEDURE — 90471 IMMUNIZATION ADMIN: CPT | Performed by: FAMILY MEDICINE

## 2019-12-06 PROCEDURE — 90750 HZV VACC RECOMBINANT IM: CPT | Performed by: FAMILY MEDICINE

## 2019-12-20 DIAGNOSIS — R19.7 DIARRHEA, UNSPECIFIED TYPE: ICD-10-CM

## 2019-12-20 RX ORDER — LOPERAMIDE HYDROCHLORIDE 2 MG/1
CAPSULE ORAL
Qty: 240 CAPSULE | Refills: 0 | Status: SHIPPED | OUTPATIENT
Start: 2019-12-20 | End: 2020-01-20

## 2020-01-11 ENCOUNTER — APPOINTMENT (OUTPATIENT)
Dept: LAB | Facility: CLINIC | Age: 65
End: 2020-01-11
Payer: COMMERCIAL

## 2020-01-11 ENCOUNTER — TRANSCRIBE ORDERS (OUTPATIENT)
Dept: ADMINISTRATIVE | Facility: HOSPITAL | Age: 65
End: 2020-01-11

## 2020-01-11 DIAGNOSIS — Z51.81 ENCOUNTER FOR THERAPEUTIC DRUG MONITORING: ICD-10-CM

## 2020-01-11 DIAGNOSIS — M81.0 OSTEOPOROSIS, POSTMENOPAUSAL: Primary | ICD-10-CM

## 2020-01-11 DIAGNOSIS — M81.0 OSTEOPOROSIS, POSTMENOPAUSAL: ICD-10-CM

## 2020-01-11 DIAGNOSIS — Z98.84 S/P GASTRIC BYPASS: ICD-10-CM

## 2020-01-11 LAB
25(OH)D3 SERPL-MCNC: 46.2 NG/ML (ref 30–100)
ALBUMIN SERPL BCP-MCNC: 3.4 G/DL (ref 3.5–5)
ALP SERPL-CCNC: 56 U/L (ref 46–116)
ALT SERPL W P-5'-P-CCNC: 29 U/L (ref 12–78)
ANION GAP SERPL CALCULATED.3IONS-SCNC: 3 MMOL/L (ref 4–13)
AST SERPL W P-5'-P-CCNC: 17 U/L (ref 5–45)
BASOPHILS # BLD AUTO: 0.04 THOUSANDS/ΜL (ref 0–0.1)
BASOPHILS NFR BLD AUTO: 1 % (ref 0–1)
BILIRUB SERPL-MCNC: 0.5 MG/DL (ref 0.2–1)
BUN SERPL-MCNC: 23 MG/DL (ref 5–25)
CALCIUM SERPL-MCNC: 8.6 MG/DL (ref 8.3–10.1)
CHLORIDE SERPL-SCNC: 112 MMOL/L (ref 100–108)
CO2 SERPL-SCNC: 26 MMOL/L (ref 21–32)
CREAT SERPL-MCNC: 0.51 MG/DL (ref 0.6–1.3)
EOSINOPHIL # BLD AUTO: 0.23 THOUSAND/ΜL (ref 0–0.61)
EOSINOPHIL NFR BLD AUTO: 3 % (ref 0–6)
ERYTHROCYTE [DISTWIDTH] IN BLOOD BY AUTOMATED COUNT: 13.2 % (ref 11.6–15.1)
GFR SERPL CREATININE-BSD FRML MDRD: 103 ML/MIN/1.73SQ M
GLUCOSE P FAST SERPL-MCNC: 75 MG/DL (ref 65–99)
HCT VFR BLD AUTO: 37.7 % (ref 34.8–46.1)
HGB BLD-MCNC: 11.8 G/DL (ref 11.5–15.4)
IMM GRANULOCYTES # BLD AUTO: 0.02 THOUSAND/UL (ref 0–0.2)
IMM GRANULOCYTES NFR BLD AUTO: 0 % (ref 0–2)
LYMPHOCYTES # BLD AUTO: 2.47 THOUSANDS/ΜL (ref 0.6–4.47)
LYMPHOCYTES NFR BLD AUTO: 34 % (ref 14–44)
MCH RBC QN AUTO: 30.7 PG (ref 26.8–34.3)
MCHC RBC AUTO-ENTMCNC: 31.3 G/DL (ref 31.4–37.4)
MCV RBC AUTO: 98 FL (ref 82–98)
MONOCYTES # BLD AUTO: 0.75 THOUSAND/ΜL (ref 0.17–1.22)
MONOCYTES NFR BLD AUTO: 10 % (ref 4–12)
NEUTROPHILS # BLD AUTO: 3.78 THOUSANDS/ΜL (ref 1.85–7.62)
NEUTS SEG NFR BLD AUTO: 52 % (ref 43–75)
NRBC BLD AUTO-RTO: 0 /100 WBCS
PLATELET # BLD AUTO: 213 THOUSANDS/UL (ref 149–390)
PMV BLD AUTO: 12.5 FL (ref 8.9–12.7)
POTASSIUM SERPL-SCNC: 3.7 MMOL/L (ref 3.5–5.3)
PROT SERPL-MCNC: 6.3 G/DL (ref 6.4–8.2)
RBC # BLD AUTO: 3.84 MILLION/UL (ref 3.81–5.12)
SODIUM SERPL-SCNC: 141 MMOL/L (ref 136–145)
WBC # BLD AUTO: 7.29 THOUSAND/UL (ref 4.31–10.16)

## 2020-01-11 PROCEDURE — 82306 VITAMIN D 25 HYDROXY: CPT

## 2020-01-11 PROCEDURE — 85025 COMPLETE CBC W/AUTO DIFF WBC: CPT

## 2020-01-11 PROCEDURE — 80053 COMPREHEN METABOLIC PANEL: CPT

## 2020-01-11 PROCEDURE — 36415 COLL VENOUS BLD VENIPUNCTURE: CPT

## 2020-01-20 DIAGNOSIS — R19.7 DIARRHEA, UNSPECIFIED TYPE: ICD-10-CM

## 2020-01-20 DIAGNOSIS — Z12.39 SCREENING FOR BREAST CANCER: ICD-10-CM

## 2020-01-20 RX ORDER — LOPERAMIDE HYDROCHLORIDE 2 MG/1
CAPSULE ORAL
Qty: 240 CAPSULE | Refills: 0 | Status: SHIPPED | OUTPATIENT
Start: 2020-01-20 | End: 2020-03-02

## 2020-01-29 DIAGNOSIS — R19.7 DIARRHEA, UNSPECIFIED TYPE: ICD-10-CM

## 2020-01-29 RX ORDER — MONTELUKAST SODIUM 4 MG/1
TABLET, CHEWABLE ORAL
Qty: 540 TABLET | Refills: 0 | Status: SHIPPED | OUTPATIENT
Start: 2020-01-29 | End: 2020-05-04

## 2020-02-14 DIAGNOSIS — M16.0 PRIMARY OSTEOARTHRITIS OF BOTH HIPS: ICD-10-CM

## 2020-02-14 DIAGNOSIS — E03.9 HYPOTHYROIDISM, UNSPECIFIED TYPE: ICD-10-CM

## 2020-02-15 RX ORDER — MELOXICAM 15 MG/1
TABLET ORAL
Qty: 90 TABLET | Refills: 1 | Status: SHIPPED | OUTPATIENT
Start: 2020-02-15 | End: 2020-08-12

## 2020-02-15 RX ORDER — LEVOTHYROXINE SODIUM 112 UG/1
TABLET ORAL
Qty: 45 TABLET | Refills: 1 | Status: SHIPPED | OUTPATIENT
Start: 2020-02-15 | End: 2020-08-12

## 2020-03-02 DIAGNOSIS — R19.7 DIARRHEA, UNSPECIFIED TYPE: ICD-10-CM

## 2020-03-02 RX ORDER — LOPERAMIDE HYDROCHLORIDE 2 MG/1
CAPSULE ORAL
Qty: 240 CAPSULE | Refills: 0 | Status: SHIPPED | OUTPATIENT
Start: 2020-03-02 | End: 2020-03-14 | Stop reason: ALTCHOICE

## 2020-03-14 ENCOUNTER — OFFICE VISIT (OUTPATIENT)
Dept: FAMILY MEDICINE CLINIC | Facility: CLINIC | Age: 65
End: 2020-03-14
Payer: COMMERCIAL

## 2020-03-14 VITALS
BODY MASS INDEX: 35.03 KG/M2 | WEIGHT: 205.2 LBS | DIASTOLIC BLOOD PRESSURE: 70 MMHG | HEIGHT: 64 IN | SYSTOLIC BLOOD PRESSURE: 106 MMHG | HEART RATE: 72 BPM | TEMPERATURE: 96.3 F | OXYGEN SATURATION: 98 %

## 2020-03-14 DIAGNOSIS — J45.20 MILD INTERMITTENT ASTHMA WITHOUT COMPLICATION: ICD-10-CM

## 2020-03-14 DIAGNOSIS — Z23 NEED FOR VACCINATION AGAINST STREPTOCOCCUS PNEUMONIAE: ICD-10-CM

## 2020-03-14 DIAGNOSIS — E04.1 NONTOXIC UNINODULAR GOITER: ICD-10-CM

## 2020-03-14 DIAGNOSIS — K63.89 BACTERIAL OVERGROWTH SYNDROME: Primary | ICD-10-CM

## 2020-03-14 DIAGNOSIS — R19.7 DIARRHEA, UNSPECIFIED TYPE: ICD-10-CM

## 2020-03-14 DIAGNOSIS — M81.0 AGE-RELATED OSTEOPOROSIS WITHOUT CURRENT PATHOLOGICAL FRACTURE: ICD-10-CM

## 2020-03-14 DIAGNOSIS — E89.0 POSTOPERATIVE HYPOTHYROIDISM: ICD-10-CM

## 2020-03-14 DIAGNOSIS — Z13.6 ENCOUNTER FOR LIPID SCREENING FOR CARDIOVASCULAR DISEASE: ICD-10-CM

## 2020-03-14 DIAGNOSIS — M15.9 GENERALIZED OSTEOARTHRITIS OF MULTIPLE SITES: ICD-10-CM

## 2020-03-14 DIAGNOSIS — Z23 NEED FOR DTAP VACCINATION: ICD-10-CM

## 2020-03-14 DIAGNOSIS — Z13.220 ENCOUNTER FOR LIPID SCREENING FOR CARDIOVASCULAR DISEASE: ICD-10-CM

## 2020-03-14 PROCEDURE — 3008F BODY MASS INDEX DOCD: CPT | Performed by: FAMILY MEDICINE

## 2020-03-14 PROCEDURE — 90732 PPSV23 VACC 2 YRS+ SUBQ/IM: CPT | Performed by: FAMILY MEDICINE

## 2020-03-14 PROCEDURE — 90471 IMMUNIZATION ADMIN: CPT | Performed by: FAMILY MEDICINE

## 2020-03-14 PROCEDURE — 90472 IMMUNIZATION ADMIN EACH ADD: CPT | Performed by: FAMILY MEDICINE

## 2020-03-14 PROCEDURE — 1036F TOBACCO NON-USER: CPT | Performed by: FAMILY MEDICINE

## 2020-03-14 PROCEDURE — 99214 OFFICE O/P EST MOD 30 MIN: CPT | Performed by: FAMILY MEDICINE

## 2020-03-14 PROCEDURE — 90715 TDAP VACCINE 7 YRS/> IM: CPT | Performed by: FAMILY MEDICINE

## 2020-03-14 RX ORDER — GABAPENTIN 300 MG/1
600 CAPSULE ORAL
Refills: 1
Start: 2020-03-14 | End: 2021-03-24 | Stop reason: SDUPTHER

## 2020-03-14 RX ORDER — DIPHENOXYLATE HYDROCHLORIDE AND ATROPINE SULFATE 2.5; .025 MG/1; MG/1
TABLET ORAL
Qty: 720 TABLET | Refills: 1 | Status: SHIPPED | OUTPATIENT
Start: 2020-03-14 | End: 2020-09-11 | Stop reason: SDUPTHER

## 2020-03-14 NOTE — PROGRESS NOTES
Assessment/Plan:    Patient is 53-year-old female seen for follow-up of chronic medical conditions  Patient sees [facility] for Ocampo's esophagus  She recently had a scope and needs to take the omeprazole twice a day  This causes the diarrhea from her 6year-old bowel syndrome to be even worse  She takes Lomotil on a regular basis for this  We reviewed her meds and she no longer takes Imodium  I sent a 90 day supply of Lomotil to her mail-in  She also takes colestipol for her diarrhea  Patient recently had blood work because of her Prolia infusion  I reviewed her chemistry and CBC  She has not had a lipid panel and I have ordered that to have done with her next set of blood work  We do write for her thyroid medication  She had an ultrasound last October and it did show a new tiny nodule  We will repeat the ultrasound in October to check on this  She did have part of her thyroid removed previously because of the nodule  This was not a cancer  Patient does see GYN  Patient does travel back and forth to [place] for work  I did caution her about this in light of the corona virus  I will see her back in 6 months  I did give her orders for fasting blood work  No problem-specific Assessment & Plan notes found for this encounter  Diagnoses and all orders for this visit:    Bacterial overgrowth syndrome  -     diphenoxylate-atropine (LOMOTIL) 2 5-0 025 mg per tablet; 4 tabs twice a day  -     Comprehensive metabolic panel; Future  -     CBC and differential; Future    Diarrhea, unspecified type    Postoperative hypothyroidism  -     Comprehensive metabolic panel; Future  -     CBC and differential; Future  -     TSH, 3rd generation with Free T4 reflex; Future  -     US thyroid; Future    Nontoxic uninodular goiter  -     US thyroid;  Future    Mild intermittent asthma without complication  -     PNEUMOCOCCAL POLYSACCHARIDE VACCINE 23-VALENT =>1YO SQ IM    Age-related osteoporosis without current pathological fracture    Need for vaccination against Streptococcus pneumoniae  -     PNEUMOCOCCAL POLYSACCHARIDE VACCINE 23-VALENT =>1YO SQ IM    Need for DTaP vaccination  -     TDAP VACCINE GREATER THAN OR EQUAL TO 6YO IM    Generalized osteoarthritis of multiple sites  -     gabapentin (NEURONTIN) 300 mg capsule; Take 2 capsules (600 mg total) by mouth daily at bedtime    Encounter for lipid screening for cardiovascular disease  -     Lipid Panel with Direct LDL reflex; Future          Subjective:   Chief Complaint   Patient presents with    Follow-up     6 month follow up chronic conditions      Patient ID: Yvette Huber is a 63 y o  female  Patient is here for follow up  Since I had last seen her she has had treatment for Barretts  Needs to stay on Omeprazole which causes worsening of diarrhea with her IBS  The following portions of the patient's history were reviewed and updated as appropriate: allergies, current medications, past family history, past medical history, past social history, past surgical history and problem list     Review of Systems   Constitutional: Negative for chills and fever  HENT: Negative for congestion and sore throat  Respiratory: Negative for chest tightness  Cardiovascular: Negative for chest pain and palpitations  Gastrointestinal: Positive for diarrhea  Negative for abdominal pain, constipation and nausea  Genitourinary: Negative for difficulty urinating  Skin: Negative  Neurological: Negative for dizziness and headaches  Psychiatric/Behavioral: Negative  Objective:      /70 (BP Location: Right arm, Patient Position: Sitting, Cuff Size: Large)   Pulse 72   Temp (!) 96 3 °F (35 7 °C)   Ht 5' 3 78" (1 62 m)   Wt 93 1 kg (205 lb 3 2 oz)   SpO2 98%   BMI 35 47 kg/m²          Physical Exam   Constitutional: She is oriented to person, place, and time  She appears well-nourished  No distress  Obese     Neck: Carotid bruit is not present  No thyromegaly present  Cardiovascular: Normal rate, regular rhythm and normal heart sounds  Pulmonary/Chest: Effort normal and breath sounds normal    Musculoskeletal: She exhibits no edema  Lymphadenopathy:     She has no cervical adenopathy  Neurological: She is alert and oriented to person, place, and time  Skin: Skin is warm and dry  Psychiatric: She has a normal mood and affect  Nursing note and vitals reviewed

## 2020-05-04 DIAGNOSIS — R19.7 DIARRHEA, UNSPECIFIED TYPE: ICD-10-CM

## 2020-05-04 RX ORDER — MONTELUKAST SODIUM 4 MG/1
TABLET, CHEWABLE ORAL
Qty: 540 TABLET | Refills: 3 | Status: SHIPPED | OUTPATIENT
Start: 2020-05-04 | End: 2021-03-24 | Stop reason: SDUPTHER

## 2020-08-12 DIAGNOSIS — E03.9 HYPOTHYROIDISM, UNSPECIFIED TYPE: ICD-10-CM

## 2020-08-12 DIAGNOSIS — M16.0 PRIMARY OSTEOARTHRITIS OF BOTH HIPS: ICD-10-CM

## 2020-08-12 RX ORDER — LEVOTHYROXINE SODIUM 112 UG/1
TABLET ORAL
Qty: 45 TABLET | Refills: 3 | Status: SHIPPED | OUTPATIENT
Start: 2020-08-12 | End: 2021-03-24 | Stop reason: DRUGHIGH

## 2020-08-12 RX ORDER — MELOXICAM 15 MG/1
TABLET ORAL
Qty: 90 TABLET | Refills: 3 | Status: SHIPPED | OUTPATIENT
Start: 2020-08-12 | End: 2021-03-24 | Stop reason: SDUPTHER

## 2020-09-05 ENCOUNTER — OFFICE VISIT (OUTPATIENT)
Dept: FAMILY MEDICINE CLINIC | Facility: CLINIC | Age: 65
End: 2020-09-05
Payer: COMMERCIAL

## 2020-09-05 VITALS
DIASTOLIC BLOOD PRESSURE: 72 MMHG | HEART RATE: 71 BPM | WEIGHT: 221.8 LBS | SYSTOLIC BLOOD PRESSURE: 110 MMHG | HEIGHT: 64 IN | TEMPERATURE: 98 F | OXYGEN SATURATION: 95 % | BODY MASS INDEX: 37.87 KG/M2

## 2020-09-05 DIAGNOSIS — K58.0 IRRITABLE BOWEL SYNDROME WITH DIARRHEA: ICD-10-CM

## 2020-09-05 DIAGNOSIS — E89.0 POSTOPERATIVE HYPOTHYROIDISM: Primary | ICD-10-CM

## 2020-09-05 PROCEDURE — 99213 OFFICE O/P EST LOW 20 MIN: CPT | Performed by: FAMILY MEDICINE

## 2020-09-05 PROCEDURE — 1036F TOBACCO NON-USER: CPT | Performed by: FAMILY MEDICINE

## 2020-09-05 NOTE — PROGRESS NOTES
Assessment/Plan:  Patient is here for follow up of hypothyroidism  She did not have her blood work done yet  She will get an US to follow up on nodule  She continues with diarrhea from irritable bowel - Xifaxan and Immodium  She will return in six months  Diagnoses and all orders for this visit:    Postoperative hypothyroidism    Irritable bowel syndrome with diarrhea          Subjective:   Chief Complaint   Patient presents with    Follow-up     6 month follow up for chronic conditions        Patient ID: Tona Card is a 63 y o  female  Patient is here for follow up of chronic medical conditions  She did not get her bloodwork  Omeprazole exacerbates diarrhea, but needs to take because of Ocampo's esophagus  She is working from home  The following portions of the patient's history were reviewed and updated as appropriate: allergies, current medications, past family history, past medical history, past social history, past surgical history and problem list     Review of Systems   Constitutional: Negative for chills and fever  HENT: Negative for congestion and sore throat  Respiratory: Negative for chest tightness  Cardiovascular: Negative for chest pain and palpitations  Gastrointestinal: Positive for diarrhea  Negative for abdominal pain, constipation and nausea  Genitourinary: Negative for difficulty urinating  Skin: Negative  Neurological: Negative for dizziness and headaches  Psychiatric/Behavioral: Negative  Objective:      /72 (BP Location: Right arm, Patient Position: Sitting, Cuff Size: Large)   Pulse 71   Temp 98 °F (36 7 °C)   Ht 5' 4 17" (1 63 m)   Wt 101 kg (221 lb 12 8 oz)   SpO2 95%   BMI 37 87 kg/m²          Physical Exam  Vitals signs and nursing note reviewed  Constitutional:       General: She is not in acute distress  HENT:      Head: Normocephalic  Neck:      Thyroid: No thyromegaly     Cardiovascular:      Rate and Rhythm: Normal rate and regular rhythm  Heart sounds: Normal heart sounds  Pulmonary:      Effort: Pulmonary effort is normal       Breath sounds: Normal breath sounds  Musculoskeletal:      Right lower leg: No edema  Left lower leg: No edema  Lymphadenopathy:      Cervical: No cervical adenopathy  Skin:     General: Skin is warm and dry  Neurological:      Mental Status: She is alert and oriented to person, place, and time     Psychiatric:         Mood and Affect: Mood normal

## 2020-09-10 ENCOUNTER — APPOINTMENT (OUTPATIENT)
Dept: LAB | Facility: CLINIC | Age: 65
End: 2020-09-10
Payer: COMMERCIAL

## 2020-09-10 DIAGNOSIS — Z13.6 ENCOUNTER FOR LIPID SCREENING FOR CARDIOVASCULAR DISEASE: ICD-10-CM

## 2020-09-10 DIAGNOSIS — Z13.220 ENCOUNTER FOR LIPID SCREENING FOR CARDIOVASCULAR DISEASE: ICD-10-CM

## 2020-09-10 DIAGNOSIS — K63.89 BACTERIAL OVERGROWTH SYNDROME: ICD-10-CM

## 2020-09-10 DIAGNOSIS — E89.0 POSTOPERATIVE HYPOTHYROIDISM: ICD-10-CM

## 2020-09-10 LAB
ALBUMIN SERPL BCP-MCNC: 3.4 G/DL (ref 3.5–5)
ALP SERPL-CCNC: 61 U/L (ref 46–116)
ALT SERPL W P-5'-P-CCNC: 23 U/L (ref 12–78)
ANION GAP SERPL CALCULATED.3IONS-SCNC: 5 MMOL/L (ref 4–13)
AST SERPL W P-5'-P-CCNC: 15 U/L (ref 5–45)
BASOPHILS # BLD AUTO: 0.03 THOUSANDS/ΜL (ref 0–0.1)
BASOPHILS NFR BLD AUTO: 1 % (ref 0–1)
BILIRUB SERPL-MCNC: 0.58 MG/DL (ref 0.2–1)
BUN SERPL-MCNC: 20 MG/DL (ref 5–25)
CALCIUM SERPL-MCNC: 8.3 MG/DL (ref 8.3–10.1)
CHLORIDE SERPL-SCNC: 112 MMOL/L (ref 100–108)
CHOLEST SERPL-MCNC: 147 MG/DL (ref 50–200)
CO2 SERPL-SCNC: 25 MMOL/L (ref 21–32)
CREAT SERPL-MCNC: 0.63 MG/DL (ref 0.6–1.3)
EOSINOPHIL # BLD AUTO: 0.29 THOUSAND/ΜL (ref 0–0.61)
EOSINOPHIL NFR BLD AUTO: 4 % (ref 0–6)
ERYTHROCYTE [DISTWIDTH] IN BLOOD BY AUTOMATED COUNT: 14.1 % (ref 11.6–15.1)
GFR SERPL CREATININE-BSD FRML MDRD: 96 ML/MIN/1.73SQ M
GLUCOSE P FAST SERPL-MCNC: 85 MG/DL (ref 65–99)
HCT VFR BLD AUTO: 37.4 % (ref 34.8–46.1)
HDLC SERPL-MCNC: 60 MG/DL
HGB BLD-MCNC: 11.6 G/DL (ref 11.5–15.4)
IMM GRANULOCYTES # BLD AUTO: 0.03 THOUSAND/UL (ref 0–0.2)
IMM GRANULOCYTES NFR BLD AUTO: 1 % (ref 0–2)
LDLC SERPL CALC-MCNC: 70 MG/DL (ref 0–100)
LYMPHOCYTES # BLD AUTO: 2.02 THOUSANDS/ΜL (ref 0.6–4.47)
LYMPHOCYTES NFR BLD AUTO: 31 % (ref 14–44)
MCH RBC QN AUTO: 30.4 PG (ref 26.8–34.3)
MCHC RBC AUTO-ENTMCNC: 31 G/DL (ref 31.4–37.4)
MCV RBC AUTO: 98 FL (ref 82–98)
MONOCYTES # BLD AUTO: 0.68 THOUSAND/ΜL (ref 0.17–1.22)
MONOCYTES NFR BLD AUTO: 10 % (ref 4–12)
NEUTROPHILS # BLD AUTO: 3.57 THOUSANDS/ΜL (ref 1.85–7.62)
NEUTS SEG NFR BLD AUTO: 53 % (ref 43–75)
NRBC BLD AUTO-RTO: 0 /100 WBCS
PLATELET # BLD AUTO: 206 THOUSANDS/UL (ref 149–390)
PMV BLD AUTO: 12.2 FL (ref 8.9–12.7)
POTASSIUM SERPL-SCNC: 3.6 MMOL/L (ref 3.5–5.3)
PROT SERPL-MCNC: 6.3 G/DL (ref 6.4–8.2)
RBC # BLD AUTO: 3.82 MILLION/UL (ref 3.81–5.12)
SODIUM SERPL-SCNC: 142 MMOL/L (ref 136–145)
TRIGL SERPL-MCNC: 87 MG/DL
TSH SERPL DL<=0.05 MIU/L-ACNC: 2.09 UIU/ML (ref 0.36–3.74)
WBC # BLD AUTO: 6.62 THOUSAND/UL (ref 4.31–10.16)

## 2020-09-10 PROCEDURE — 84443 ASSAY THYROID STIM HORMONE: CPT

## 2020-09-10 PROCEDURE — 85025 COMPLETE CBC W/AUTO DIFF WBC: CPT

## 2020-09-10 PROCEDURE — 80061 LIPID PANEL: CPT

## 2020-09-10 PROCEDURE — 36415 COLL VENOUS BLD VENIPUNCTURE: CPT

## 2020-09-10 PROCEDURE — 80053 COMPREHEN METABOLIC PANEL: CPT

## 2020-09-11 DIAGNOSIS — K63.89 BACTERIAL OVERGROWTH SYNDROME: ICD-10-CM

## 2020-09-11 DIAGNOSIS — E89.0 POSTOPERATIVE HYPOTHYROIDISM: ICD-10-CM

## 2020-09-11 RX ORDER — LEVOTHYROXINE SODIUM 0.12 MG/1
TABLET ORAL
Qty: 90 TABLET | Refills: 1 | Status: SHIPPED | OUTPATIENT
Start: 2020-09-11 | End: 2021-03-24 | Stop reason: DRUGHIGH

## 2020-09-11 RX ORDER — DIPHENOXYLATE HYDROCHLORIDE AND ATROPINE SULFATE 2.5; .025 MG/1; MG/1
TABLET ORAL
Qty: 720 TABLET | Refills: 1 | Status: SHIPPED | OUTPATIENT
Start: 2020-09-11 | End: 2021-03-24 | Stop reason: SDUPTHER

## 2020-09-15 DIAGNOSIS — E04.1 THYROID NODULE: Primary | ICD-10-CM

## 2020-10-26 ENCOUNTER — TELEPHONE (OUTPATIENT)
Dept: FAMILY MEDICINE CLINIC | Facility: CLINIC | Age: 65
End: 2020-10-26

## 2020-11-11 ENCOUNTER — IMMUNIZATIONS (OUTPATIENT)
Dept: FAMILY MEDICINE CLINIC | Facility: CLINIC | Age: 65
End: 2020-11-11
Payer: COMMERCIAL

## 2020-11-11 DIAGNOSIS — Z23 NEED FOR VACCINATION: Primary | ICD-10-CM

## 2020-11-11 PROCEDURE — 90682 RIV4 VACC RECOMBINANT DNA IM: CPT | Performed by: FAMILY MEDICINE

## 2020-11-11 PROCEDURE — 90471 IMMUNIZATION ADMIN: CPT | Performed by: FAMILY MEDICINE

## 2021-02-18 DIAGNOSIS — K22.70 BARRETT'S ESOPHAGUS WITHOUT DYSPLASIA: ICD-10-CM

## 2021-02-18 RX ORDER — OMEPRAZOLE 40 MG/1
CAPSULE, DELAYED RELEASE ORAL
Qty: 180 CAPSULE | Refills: 3 | Status: SHIPPED | OUTPATIENT
Start: 2021-02-18 | End: 2021-03-24 | Stop reason: SDUPTHER

## 2021-03-23 RX ORDER — LORATADINE 10 MG/1
10 TABLET ORAL DAILY PRN
COMMUNITY

## 2021-03-23 RX ORDER — LEVOTHYROXINE SODIUM 0.15 MG/1
150 TABLET ORAL DAILY
COMMUNITY
Start: 2021-01-15 | End: 2021-03-24 | Stop reason: SDUPTHER

## 2021-03-24 ENCOUNTER — OFFICE VISIT (OUTPATIENT)
Dept: FAMILY MEDICINE CLINIC | Facility: CLINIC | Age: 66
End: 2021-03-24
Payer: COMMERCIAL

## 2021-03-24 VITALS
SYSTOLIC BLOOD PRESSURE: 124 MMHG | WEIGHT: 229.4 LBS | TEMPERATURE: 98.9 F | DIASTOLIC BLOOD PRESSURE: 66 MMHG | HEART RATE: 79 BPM | RESPIRATION RATE: 18 BRPM | BODY MASS INDEX: 39.16 KG/M2 | HEIGHT: 64 IN | OXYGEN SATURATION: 95 %

## 2021-03-24 DIAGNOSIS — K63.89 BACTERIAL OVERGROWTH SYNDROME: ICD-10-CM

## 2021-03-24 DIAGNOSIS — M15.9 GENERALIZED OSTEOARTHRITIS OF MULTIPLE SITES: ICD-10-CM

## 2021-03-24 DIAGNOSIS — R19.7 DIARRHEA, UNSPECIFIED TYPE: ICD-10-CM

## 2021-03-24 DIAGNOSIS — M16.0 PRIMARY OSTEOARTHRITIS OF BOTH HIPS: ICD-10-CM

## 2021-03-24 DIAGNOSIS — Z13.220 ENCOUNTER FOR LIPID SCREENING FOR CARDIOVASCULAR DISEASE: ICD-10-CM

## 2021-03-24 DIAGNOSIS — E89.0 POSTOPERATIVE HYPOTHYROIDISM: Primary | ICD-10-CM

## 2021-03-24 DIAGNOSIS — K22.70 BARRETT'S ESOPHAGUS WITHOUT DYSPLASIA: ICD-10-CM

## 2021-03-24 DIAGNOSIS — Z13.6 ENCOUNTER FOR LIPID SCREENING FOR CARDIOVASCULAR DISEASE: ICD-10-CM

## 2021-03-24 DIAGNOSIS — H81.10 BENIGN PAROXYSMAL VERTIGO, UNSPECIFIED LATERALITY: ICD-10-CM

## 2021-03-24 PROBLEM — E83.51 HYPOCALCEMIA: Status: ACTIVE | Noted: 2020-10-27

## 2021-03-24 PROBLEM — M81.0 OSTEOPOROSIS, POSTMENOPAUSAL: Status: ACTIVE | Noted: 2019-12-24

## 2021-03-24 PROCEDURE — 1036F TOBACCO NON-USER: CPT | Performed by: FAMILY MEDICINE

## 2021-03-24 PROCEDURE — 99213 OFFICE O/P EST LOW 20 MIN: CPT | Performed by: FAMILY MEDICINE

## 2021-03-24 PROCEDURE — 3725F SCREEN DEPRESSION PERFORMED: CPT | Performed by: FAMILY MEDICINE

## 2021-03-24 PROCEDURE — 3008F BODY MASS INDEX DOCD: CPT | Performed by: FAMILY MEDICINE

## 2021-03-24 RX ORDER — MECLIZINE HYDROCHLORIDE 25 MG/1
25 TABLET ORAL 3 TIMES DAILY PRN
Qty: 30 TABLET | Refills: 0 | Status: SHIPPED | OUTPATIENT
Start: 2021-03-24

## 2021-03-24 RX ORDER — MONTELUKAST SODIUM 4 MG/1
2 TABLET, CHEWABLE ORAL
Qty: 540 TABLET | Refills: 3 | Status: SHIPPED | OUTPATIENT
Start: 2021-03-24 | End: 2022-03-17 | Stop reason: ALTCHOICE

## 2021-03-24 RX ORDER — DIPHENOXYLATE HYDROCHLORIDE AND ATROPINE SULFATE 2.5; .025 MG/1; MG/1
TABLET ORAL
Qty: 720 TABLET | Refills: 1 | Status: SHIPPED | OUTPATIENT
Start: 2021-03-24 | End: 2021-10-14 | Stop reason: SDUPTHER

## 2021-03-24 RX ORDER — GABAPENTIN 300 MG/1
600 CAPSULE ORAL
Qty: 90 CAPSULE | Refills: 1 | Status: SHIPPED | OUTPATIENT
Start: 2021-03-24 | End: 2021-03-24 | Stop reason: SDUPTHER

## 2021-03-24 RX ORDER — LEVOTHYROXINE SODIUM 0.15 MG/1
150 TABLET ORAL DAILY
Qty: 30 TABLET | Refills: 1 | Status: SHIPPED | OUTPATIENT
Start: 2021-03-24 | End: 2021-04-19

## 2021-03-24 RX ORDER — GABAPENTIN 300 MG/1
600 CAPSULE ORAL
Qty: 180 CAPSULE | Refills: 1 | Status: SHIPPED | OUTPATIENT
Start: 2021-03-24

## 2021-03-24 RX ORDER — OMEPRAZOLE 40 MG/1
40 CAPSULE, DELAYED RELEASE ORAL 2 TIMES DAILY
Qty: 180 CAPSULE | Refills: 3 | Status: SHIPPED | OUTPATIENT
Start: 2021-03-24 | End: 2022-03-29

## 2021-03-24 RX ORDER — MELOXICAM 15 MG/1
15 TABLET ORAL EVERY MORNING
Qty: 90 TABLET | Refills: 3 | Status: SHIPPED | OUTPATIENT
Start: 2021-03-24 | End: 2022-04-12

## 2021-03-24 NOTE — PROGRESS NOTES
BMI Counseling: Body mass index is 39 38 kg/m²  The BMI is above normal  Nutrition recommendations include encouraging healthy choices of fruits and vegetables, moderation in carbohydrate intake and reducing intake of saturated and trans fat  Exercise recommendations include moderate physical activity 150 minutes/week  No pharmacotherapy was ordered  Patient referred to PCP due to patient being overweight  Assessment/Plan:    Patient is 71-year-old female seen for follow-up of chronic medical conditions  Has had COVID vaccine  Recheck in six months  Diagnoses and all orders for this visit:    Postoperative hypothyroidism  -     levothyroxine 150 mcg tablet; Take 1 tablet (150 mcg total) by mouth daily  -     Comprehensive metabolic panel; Future  -     CBC and differential; Future    Primary osteoarthritis of both hips  -     meloxicam (MOBIC) 15 mg tablet; Take 1 tablet (15 mg total) by mouth every morning  -     Comprehensive metabolic panel; Future  -     CBC and differential; Future    Generalized osteoarthritis of multiple sites  -     gabapentin (NEURONTIN) 300 mg capsule; Take 2 capsules (600 mg total) by mouth daily at bedtime    Bacterial overgrowth syndrome  -     diphenoxylate-atropine (LOMOTIL) 2 5-0 025 mg per tablet; 4 tabs twice a day    Benign paroxysmal vertigo, unspecified laterality  -     meclizine (ANTIVERT) 25 mg tablet; Take 1 tablet (25 mg total) by mouth 3 (three) times a day as needed for dizziness    Diarrhea, unspecified type  -     colestipol (COLESTID) 1 g tablet; Take 2 tablets (2 g total) by mouth 3 (three) times a day with meals    Ocampo's esophagus without dysplasia  -     omeprazole (PriLOSEC) 40 MG capsule; Take 1 capsule (40 mg total) by mouth 2 (two) times a day  -     Comprehensive metabolic panel; Future  -     CBC and differential; Future    Encounter for lipid screening for cardiovascular disease  -     Lipid Panel with Direct LDL reflex;  Future    Other orders  -     Discontinue: levothyroxine 150 mcg tablet; Take 150 mcg by mouth daily  -     loratadine (CLARITIN) 10 mg tablet; Take 10 mg by mouth daily as needed          Subjective:   Chief Complaint   Patient presents with    Follow-up     6 month check        Patient ID: Marie Padron is a 61 y o  female  Patient is seen for follow up of chronic medical condiitons  Ocampo's esophagus - does not see GI for two years  Had thyroid removed - Dr Taty Najera at [facility]  Still being followed by endocrine at [facility]  Had issues with PTH and calcium  Taking Gabapentin      The following portions of the patient's history were reviewed and updated as appropriate: allergies, current medications, past family history, past medical history, past social history, past surgical history and problem list     Review of Systems   Constitutional: Negative for chills and fever  HENT: Negative for congestion and sore throat  Respiratory: Negative for chest tightness  Cardiovascular: Negative for chest pain and palpitations  Gastrointestinal: Negative for abdominal pain, constipation, diarrhea and nausea  Genitourinary: Negative for difficulty urinating  Skin: Negative  Neurological: Negative for dizziness and headaches  Psychiatric/Behavioral: Negative  Objective:      /66 (BP Location: Left arm, Patient Position: Sitting)   Pulse 79   Temp 98 9 °F (37 2 °C) (Tympanic)   Resp 18   Ht 5' 4" (1 626 m)   Wt 104 kg (229 lb 6 4 oz)   SpO2 95%   BMI 39 38 kg/m²          Physical Exam  Vitals signs and nursing note reviewed  Constitutional:       General: She is not in acute distress  Appearance: She is obese  HENT:      Head: Normocephalic  Right Ear: Tympanic membrane normal       Left Ear: Tympanic membrane normal    Neck:      Thyroid: No thyromegaly  Vascular: No carotid bruit  Cardiovascular:      Rate and Rhythm: Normal rate and regular rhythm        Heart sounds: Normal heart sounds  Pulmonary:      Effort: Pulmonary effort is normal       Breath sounds: Normal breath sounds  Musculoskeletal:      Right lower leg: No edema  Left lower leg: No edema  Lymphadenopathy:      Cervical: No cervical adenopathy  Skin:     General: Skin is warm and dry  Neurological:      Mental Status: She is alert and oriented to person, place, and time     Psychiatric:         Mood and Affect: Mood normal

## 2021-04-18 DIAGNOSIS — E89.0 POSTOPERATIVE HYPOTHYROIDISM: ICD-10-CM

## 2021-04-19 RX ORDER — LEVOTHYROXINE SODIUM 0.15 MG/1
TABLET ORAL
Qty: 30 TABLET | Refills: 1 | Status: SHIPPED | OUTPATIENT
Start: 2021-04-19 | End: 2021-05-12

## 2021-05-12 DIAGNOSIS — E89.0 POSTOPERATIVE HYPOTHYROIDISM: ICD-10-CM

## 2021-05-12 RX ORDER — LEVOTHYROXINE SODIUM 0.15 MG/1
TABLET ORAL
Qty: 30 TABLET | Refills: 4 | Status: SHIPPED | OUTPATIENT
Start: 2021-05-12 | End: 2022-07-12 | Stop reason: SDUPTHER

## 2021-07-01 ENCOUNTER — OFFICE VISIT (OUTPATIENT)
Dept: FAMILY MEDICINE CLINIC | Facility: CLINIC | Age: 66
End: 2021-07-01
Payer: COMMERCIAL

## 2021-07-01 ENCOUNTER — APPOINTMENT (OUTPATIENT)
Dept: LAB | Facility: CLINIC | Age: 66
End: 2021-07-01
Payer: COMMERCIAL

## 2021-07-01 VITALS
BODY MASS INDEX: 42.51 KG/M2 | HEIGHT: 64 IN | OXYGEN SATURATION: 94 % | RESPIRATION RATE: 16 BRPM | TEMPERATURE: 97.5 F | HEART RATE: 92 BPM | DIASTOLIC BLOOD PRESSURE: 74 MMHG | SYSTOLIC BLOOD PRESSURE: 120 MMHG | WEIGHT: 249 LBS

## 2021-07-01 DIAGNOSIS — R50.9 FEVER, UNSPECIFIED FEVER CAUSE: ICD-10-CM

## 2021-07-01 DIAGNOSIS — R82.998 DARK URINE: ICD-10-CM

## 2021-07-01 DIAGNOSIS — E89.0 POSTOPERATIVE HYPOTHYROIDISM: ICD-10-CM

## 2021-07-01 DIAGNOSIS — M79.89 LEG SWELLING: ICD-10-CM

## 2021-07-01 DIAGNOSIS — J45.20 MILD INTERMITTENT ASTHMA WITHOUT COMPLICATION: ICD-10-CM

## 2021-07-01 DIAGNOSIS — R68.83 CHILLS: ICD-10-CM

## 2021-07-01 DIAGNOSIS — D64.9 ANEMIA, UNSPECIFIED TYPE: ICD-10-CM

## 2021-07-01 DIAGNOSIS — R50.9 FEVER, UNSPECIFIED FEVER CAUSE: Primary | ICD-10-CM

## 2021-07-01 DIAGNOSIS — R22.43 LOCALIZED SWELLING OF BOTH LOWER LEGS: ICD-10-CM

## 2021-07-01 LAB
ALBUMIN SERPL BCP-MCNC: 2.5 G/DL (ref 3.5–5)
ALP SERPL-CCNC: 306 U/L (ref 46–116)
ALT SERPL W P-5'-P-CCNC: 44 U/L (ref 12–78)
ANION GAP SERPL CALCULATED.3IONS-SCNC: 6 MMOL/L (ref 4–13)
AST SERPL W P-5'-P-CCNC: 29 U/L (ref 5–45)
BACTERIA UR QL AUTO: ABNORMAL /HPF
BASOPHILS # BLD AUTO: 0.05 THOUSANDS/ΜL (ref 0–0.1)
BASOPHILS NFR BLD AUTO: 0 % (ref 0–1)
BILIRUB SERPL-MCNC: 0.78 MG/DL (ref 0.2–1)
BILIRUB UR QL STRIP: NEGATIVE
BUN SERPL-MCNC: 15 MG/DL (ref 5–25)
CALCIUM ALBUM COR SERPL-MCNC: 10.2 MG/DL (ref 8.3–10.1)
CALCIUM SERPL-MCNC: 9 MG/DL (ref 8.3–10.1)
CHLORIDE SERPL-SCNC: 101 MMOL/L (ref 100–108)
CLARITY UR: CLEAR
CO2 SERPL-SCNC: 30 MMOL/L (ref 21–32)
COLOR UR: YELLOW
CREAT SERPL-MCNC: 0.6 MG/DL (ref 0.6–1.3)
EOSINOPHIL # BLD AUTO: 0.17 THOUSAND/ΜL (ref 0–0.61)
EOSINOPHIL NFR BLD AUTO: 1 % (ref 0–6)
ERYTHROCYTE [DISTWIDTH] IN BLOOD BY AUTOMATED COUNT: 14.7 % (ref 11.6–15.1)
GFR SERPL CREATININE-BSD FRML MDRD: 97 ML/MIN/1.73SQ M
GLUCOSE SERPL-MCNC: 85 MG/DL (ref 65–140)
GLUCOSE UR STRIP-MCNC: NEGATIVE MG/DL
HCT VFR BLD AUTO: 30.9 % (ref 34.8–46.1)
HGB BLD-MCNC: 9.7 G/DL (ref 11.5–15.4)
HGB UR QL STRIP.AUTO: ABNORMAL
HYALINE CASTS #/AREA URNS LPF: ABNORMAL /LPF
IMM GRANULOCYTES # BLD AUTO: 0.22 THOUSAND/UL (ref 0–0.2)
IMM GRANULOCYTES NFR BLD AUTO: 1 % (ref 0–2)
KETONES UR STRIP-MCNC: NEGATIVE MG/DL
LEUKOCYTE ESTERASE UR QL STRIP: ABNORMAL
LYMPHOCYTES # BLD AUTO: 2.08 THOUSANDS/ΜL (ref 0.6–4.47)
LYMPHOCYTES NFR BLD AUTO: 12 % (ref 14–44)
MCH RBC QN AUTO: 29.9 PG (ref 26.8–34.3)
MCHC RBC AUTO-ENTMCNC: 31.4 G/DL (ref 31.4–37.4)
MCV RBC AUTO: 95 FL (ref 82–98)
MONOCYTES # BLD AUTO: 1.46 THOUSAND/ΜL (ref 0.17–1.22)
MONOCYTES NFR BLD AUTO: 9 % (ref 4–12)
NEUTROPHILS # BLD AUTO: 12.95 THOUSANDS/ΜL (ref 1.85–7.62)
NEUTS SEG NFR BLD AUTO: 77 % (ref 43–75)
NITRITE UR QL STRIP: POSITIVE
NON-SQ EPI CELLS URNS QL MICRO: ABNORMAL /HPF
NRBC BLD AUTO-RTO: 0 /100 WBCS
PH UR STRIP.AUTO: 6.5 [PH]
PLATELET # BLD AUTO: 320 THOUSANDS/UL (ref 149–390)
PMV BLD AUTO: 11.7 FL (ref 8.9–12.7)
POTASSIUM SERPL-SCNC: 3.2 MMOL/L (ref 3.5–5.3)
PROT SERPL-MCNC: 6.4 G/DL (ref 6.4–8.2)
PROT UR STRIP-MCNC: NEGATIVE MG/DL
RBC # BLD AUTO: 3.24 MILLION/UL (ref 3.81–5.12)
RBC #/AREA URNS AUTO: ABNORMAL /HPF
SODIUM SERPL-SCNC: 137 MMOL/L (ref 136–145)
SP GR UR STRIP.AUTO: 1.01 (ref 1–1.03)
UROBILINOGEN UR QL STRIP.AUTO: 1 E.U./DL
WBC # BLD AUTO: 16.93 THOUSAND/UL (ref 4.31–10.16)
WBC #/AREA URNS AUTO: ABNORMAL /HPF

## 2021-07-01 PROCEDURE — 86618 LYME DISEASE ANTIBODY: CPT

## 2021-07-01 PROCEDURE — U0005 INFEC AGEN DETEC AMPLI PROBE: HCPCS | Performed by: FAMILY MEDICINE

## 2021-07-01 PROCEDURE — 87086 URINE CULTURE/COLONY COUNT: CPT

## 2021-07-01 PROCEDURE — 80053 COMPREHEN METABOLIC PANEL: CPT

## 2021-07-01 PROCEDURE — 36415 COLL VENOUS BLD VENIPUNCTURE: CPT

## 2021-07-01 PROCEDURE — 99214 OFFICE O/P EST MOD 30 MIN: CPT | Performed by: FAMILY MEDICINE

## 2021-07-01 PROCEDURE — 1036F TOBACCO NON-USER: CPT | Performed by: FAMILY MEDICINE

## 2021-07-01 PROCEDURE — 85025 COMPLETE CBC W/AUTO DIFF WBC: CPT

## 2021-07-01 PROCEDURE — 82607 VITAMIN B-12: CPT

## 2021-07-01 PROCEDURE — 82728 ASSAY OF FERRITIN: CPT

## 2021-07-01 PROCEDURE — 83540 ASSAY OF IRON: CPT

## 2021-07-01 PROCEDURE — 87186 SC STD MICRODIL/AGAR DIL: CPT

## 2021-07-01 PROCEDURE — U0003 INFECTIOUS AGENT DETECTION BY NUCLEIC ACID (DNA OR RNA); SEVERE ACUTE RESPIRATORY SYNDROME CORONAVIRUS 2 (SARS-COV-2) (CORONAVIRUS DISEASE [COVID-19]), AMPLIFIED PROBE TECHNIQUE, MAKING USE OF HIGH THROUGHPUT TECHNOLOGIES AS DESCRIBED BY CMS-2020-01-R: HCPCS | Performed by: FAMILY MEDICINE

## 2021-07-01 PROCEDURE — 87077 CULTURE AEROBIC IDENTIFY: CPT

## 2021-07-01 PROCEDURE — 81001 URINALYSIS AUTO W/SCOPE: CPT

## 2021-07-01 PROCEDURE — 83880 ASSAY OF NATRIURETIC PEPTIDE: CPT

## 2021-07-01 PROCEDURE — 83550 IRON BINDING TEST: CPT

## 2021-07-01 NOTE — PROGRESS NOTES
Assessment/Plan:    Patient is 53-year-old female with history of chills, fever and headache for the past week  The chills are almost debilitating  She also have leg swelling  Patient has had COVID vaccine  She has been working from home since she had foot surgery over 2 weeks ago  I ordered blood work to check a CBC, CMP, Lyme antibody and urine  Even though she has been immunized, we will check a COVID swab  Diagnoses and all orders for this visit:    Fever, unspecified fever cause  -     CBC and differential; Future  -     Comprehensive metabolic panel; Future  -     UA (URINE) with reflex to Scope; Future  -     Urine culture; Future  -     Lyme Antibody Profile with reflex to WB; Future  -     Novel Coronavirus (COVID-19), PCR [facility] Collected in Office    Chills  -     CBC and differential; Future  -     Comprehensive metabolic panel; Future  -     UA (URINE) with reflex to Scope; Future  -     Urine culture; Future  -     Lyme Antibody Profile with reflex to WB; Future    Leg swelling  -     CBC and differential; Future  -     Comprehensive metabolic panel; Future  -     UA (URINE) with reflex to Scope; Future  -     Urine culture; Future  -     Lyme Antibody Profile with reflex to WB; Future    Dark urine  -     CBC and differential; Future  -     Comprehensive metabolic panel; Future  -     UA (URINE) with reflex to Scope; Future  -     Urine culture; Future  -     Lyme Antibody Profile with reflex to WB; Future    Mild intermittent asthma without complication    Postoperative hypothyroidism          Subjective:   Chief Complaint   Patient presents with    Chills    Headache - Recurrent or Known Dx Migraines        Patient ID: Eva Villanueva is a 61 y o  female  Started with chills last Thursday, fatigue, chills continue through Sunday  Then developed fever blisters  Then Tuesday again had severe chills  Has been taking Tylenol, gatorade  Woke up with chills   Also both legs are swollen and urine is dark  Also with headache since she's had the chills  Headache - Recurrent or Known Dx Migraines  Associated symptoms include chills, coughing, a fever and headaches  Pertinent negatives include no abdominal pain, chest pain, congestion, nausea or sore throat  The following portions of the patient's history were reviewed and updated as appropriate: allergies, current medications, past family history, past medical history, past social history, past surgical history and problem list     Review of Systems   Constitutional: Positive for chills and fever  HENT: Negative for congestion and sore throat  Respiratory: Positive for cough  Negative for chest tightness  Cardiovascular: Positive for leg swelling  Negative for chest pain and palpitations  Gastrointestinal: Negative for abdominal pain, constipation, diarrhea and nausea  Genitourinary: Negative for difficulty urinating  Dark urine   Skin: Negative  Neurological: Positive for headaches  Negative for dizziness  Psychiatric/Behavioral: Negative  Objective:      /74 (BP Location: Right arm, Patient Position: Sitting, Cuff Size: Large)   Pulse 92   Temp 97 5 °F (36 4 °C) (Tympanic)   Resp 16   Ht 5' 3 78" (1 62 m)   Wt 113 kg (249 lb)   SpO2 94%   BMI 43 04 kg/m²          Physical Exam  Vitals and nursing note reviewed  Constitutional:       General: She is not in acute distress  Appearance: She is obese  HENT:      Head: Normocephalic  Right Ear: Tympanic membrane normal       Left Ear: Tympanic membrane normal       Mouth/Throat:      Pharynx: No posterior oropharyngeal erythema  Comments: Multiple cold sores healing around mouth  Neck:      Thyroid: No thyromegaly  Cardiovascular:      Rate and Rhythm: Normal rate and regular rhythm  Heart sounds: Normal heart sounds  Pulmonary:      Effort: Pulmonary effort is normal       Breath sounds: Normal breath sounds     Abdominal: General: Bowel sounds are normal       Tenderness: There is no abdominal tenderness  There is no right CVA tenderness or left CVA tenderness  Musculoskeletal:      Right lower leg: Edema present  Left lower leg: Edema present  Comments: No erythema or swelling of lower extremities  Lymphadenopathy:      Cervical: No cervical adenopathy  Skin:     General: Skin is warm and dry  Findings: No erythema  Neurological:      Mental Status: She is alert and oriented to person, place, and time     Psychiatric:         Mood and Affect: Mood normal

## 2021-07-02 ENCOUNTER — TELEPHONE (OUTPATIENT)
Dept: FAMILY MEDICINE CLINIC | Facility: CLINIC | Age: 66
End: 2021-07-02

## 2021-07-02 LAB
B BURGDOR IGG+IGM SER-ACNC: -1
FERRITIN SERPL-MCNC: 134 NG/ML (ref 8–388)
IRON SATN MFR SERPL: 8 %
IRON SERPL-MCNC: 23 UG/DL (ref 50–170)
NT-PROBNP SERPL-MCNC: 3952 PG/ML
SARS-COV-2 RNA RESP QL NAA+PROBE: NEGATIVE
TIBC SERPL-MCNC: 279 UG/DL (ref 250–450)
VIT B12 SERPL-MCNC: 5398 PG/ML (ref 100–900)

## 2021-07-02 NOTE — TELEPHONE ENCOUNTER
Pt LM stating she would like to know if there is somewhere she can go to rule out a DVT  Pt states her Orthopedic Dr would like to make sure she does not have one

## 2021-07-03 LAB — BACTERIA UR CULT: ABNORMAL

## 2021-07-09 ENCOUNTER — TRANSITIONAL CARE MANAGEMENT (OUTPATIENT)
Dept: FAMILY MEDICINE CLINIC | Facility: CLINIC | Age: 66
End: 2021-07-09

## 2021-07-09 RX ORDER — DIPHENHYDRAMINE HYDROCHLORIDE, ZINC ACETATE 2; .1 G/100G; G/100G
CREAM TOPICAL 2 TIMES DAILY PRN
COMMUNITY
Start: 2021-07-08 | End: 2022-07-08

## 2021-07-09 RX ORDER — FUROSEMIDE 20 MG/1
20 TABLET ORAL 2 TIMES DAILY PRN
COMMUNITY
Start: 2021-07-08 | End: 2022-07-08

## 2021-07-09 RX ORDER — CIPROFLOXACIN 500 MG/1
500 TABLET, FILM COATED ORAL 2 TIMES DAILY
COMMUNITY
Start: 2021-07-08 | End: 2021-07-18

## 2021-07-12 DIAGNOSIS — N20.0 NEPHROLITHIASIS: Primary | ICD-10-CM

## 2021-07-13 ENCOUNTER — APPOINTMENT (OUTPATIENT)
Dept: LAB | Facility: CLINIC | Age: 66
End: 2021-07-13
Payer: COMMERCIAL

## 2021-07-13 ENCOUNTER — TELEPHONE (OUTPATIENT)
Dept: HEMATOLOGY ONCOLOGY | Facility: CLINIC | Age: 66
End: 2021-07-13

## 2021-07-13 DIAGNOSIS — N20.0 NEPHROLITHIASIS: ICD-10-CM

## 2021-07-13 LAB
ANION GAP SERPL CALCULATED.3IONS-SCNC: 5 MMOL/L (ref 4–13)
BASOPHILS # BLD AUTO: 0.08 THOUSANDS/ΜL (ref 0–0.1)
BASOPHILS NFR BLD AUTO: 1 % (ref 0–1)
BUN SERPL-MCNC: 15 MG/DL (ref 5–25)
CALCIUM SERPL-MCNC: 8.6 MG/DL (ref 8.3–10.1)
CHLORIDE SERPL-SCNC: 107 MMOL/L (ref 100–108)
CO2 SERPL-SCNC: 27 MMOL/L (ref 21–32)
CREAT SERPL-MCNC: 0.62 MG/DL (ref 0.6–1.3)
EOSINOPHIL # BLD AUTO: 0.3 THOUSAND/ΜL (ref 0–0.61)
EOSINOPHIL NFR BLD AUTO: 4 % (ref 0–6)
ERYTHROCYTE [DISTWIDTH] IN BLOOD BY AUTOMATED COUNT: 14.9 % (ref 11.6–15.1)
GFR SERPL CREATININE-BSD FRML MDRD: 96 ML/MIN/1.73SQ M
GLUCOSE SERPL-MCNC: 83 MG/DL (ref 65–140)
HCT VFR BLD AUTO: 33.9 % (ref 34.8–46.1)
HGB BLD-MCNC: 10.4 G/DL (ref 11.5–15.4)
IMM GRANULOCYTES # BLD AUTO: 0.05 THOUSAND/UL (ref 0–0.2)
IMM GRANULOCYTES NFR BLD AUTO: 1 % (ref 0–2)
LYMPHOCYTES # BLD AUTO: 2.35 THOUSANDS/ΜL (ref 0.6–4.47)
LYMPHOCYTES NFR BLD AUTO: 34 % (ref 14–44)
MCH RBC QN AUTO: 30.3 PG (ref 26.8–34.3)
MCHC RBC AUTO-ENTMCNC: 30.7 G/DL (ref 31.4–37.4)
MCV RBC AUTO: 99 FL (ref 82–98)
MONOCYTES # BLD AUTO: 0.67 THOUSAND/ΜL (ref 0.17–1.22)
MONOCYTES NFR BLD AUTO: 10 % (ref 4–12)
NEUTROPHILS # BLD AUTO: 3.39 THOUSANDS/ΜL (ref 1.85–7.62)
NEUTS SEG NFR BLD AUTO: 50 % (ref 43–75)
NRBC BLD AUTO-RTO: 0 /100 WBCS
PLATELET # BLD AUTO: 450 THOUSANDS/UL (ref 149–390)
PMV BLD AUTO: 11.5 FL (ref 8.9–12.7)
POTASSIUM SERPL-SCNC: 3.6 MMOL/L (ref 3.5–5.3)
RBC # BLD AUTO: 3.43 MILLION/UL (ref 3.81–5.12)
SODIUM SERPL-SCNC: 139 MMOL/L (ref 136–145)
WBC # BLD AUTO: 6.84 THOUSAND/UL (ref 4.31–10.16)

## 2021-07-13 PROCEDURE — 36415 COLL VENOUS BLD VENIPUNCTURE: CPT

## 2021-07-13 PROCEDURE — 80048 BASIC METABOLIC PNL TOTAL CA: CPT

## 2021-07-13 PROCEDURE — 85025 COMPLETE CBC W/AUTO DIFF WBC: CPT

## 2021-07-13 NOTE — TELEPHONE ENCOUNTER
I called pt to offer to schedule hematology consultation  Spoke w/ pt, she was recently in hospital at [facility] THE [facility] for urosepsis and is still following up for that  She stated that the anemia may be secondary to that dx and she will call in a few weeks if she needs us after she has her labs repeated

## 2021-07-14 ENCOUNTER — OFFICE VISIT (OUTPATIENT)
Dept: FAMILY MEDICINE CLINIC | Facility: CLINIC | Age: 66
End: 2021-07-14
Payer: COMMERCIAL

## 2021-07-14 VITALS
HEART RATE: 84 BPM | BODY MASS INDEX: 39.09 KG/M2 | DIASTOLIC BLOOD PRESSURE: 74 MMHG | WEIGHT: 229 LBS | TEMPERATURE: 98.7 F | HEIGHT: 64 IN | OXYGEN SATURATION: 95 % | SYSTOLIC BLOOD PRESSURE: 128 MMHG

## 2021-07-14 DIAGNOSIS — E87.6 HYPOKALEMIA: ICD-10-CM

## 2021-07-14 DIAGNOSIS — N20.0 KIDNEY STONES: Primary | ICD-10-CM

## 2021-07-14 PROCEDURE — 99496 TRANSJ CARE MGMT HIGH F2F 7D: CPT | Performed by: FAMILY MEDICINE

## 2021-07-14 PROCEDURE — 3008F BODY MASS INDEX DOCD: CPT | Performed by: FAMILY MEDICINE

## 2021-07-14 RX ORDER — TRAMADOL HYDROCHLORIDE 50 MG/1
50 TABLET ORAL EVERY 6 HOURS PRN
COMMUNITY
Start: 2021-07-09 | End: 2021-09-27 | Stop reason: ALTCHOICE

## 2021-07-14 RX ORDER — PREDNISONE 50 MG/1
TABLET ORAL
COMMUNITY
Start: 2021-07-09 | End: 2021-09-27 | Stop reason: ALTCHOICE

## 2021-07-14 RX ORDER — TAMSULOSIN HYDROCHLORIDE 0.4 MG/1
0.4 CAPSULE ORAL
COMMUNITY
Start: 2021-07-09 | End: 2021-08-08

## 2021-07-14 RX ORDER — ERGOCALCIFEROL (VITAMIN D2) 1250 MCG
20000 CAPSULE ORAL
COMMUNITY
End: 2021-07-27 | Stop reason: RX

## 2021-07-14 RX ORDER — OXYCODONE HYDROCHLORIDE AND ACETAMINOPHEN 5; 325 MG/1; MG/1
TABLET ORAL
COMMUNITY
Start: 2021-04-19 | End: 2021-09-27 | Stop reason: ALTCHOICE

## 2021-07-14 RX ORDER — OXYBUTYNIN CHLORIDE 10 MG/1
10 TABLET, EXTENDED RELEASE ORAL
COMMUNITY
Start: 2021-07-09 | End: 2021-07-27 | Stop reason: ALTCHOICE

## 2021-07-14 RX ORDER — DIAZEPAM 10 MG/1
TABLET ORAL
COMMUNITY
Start: 2021-07-12 | End: 2021-09-27 | Stop reason: ALTCHOICE

## 2021-07-14 RX ORDER — LANOLIN ALCOHOL/MO/W.PET/CERES
CREAM (GRAM) TOPICAL
COMMUNITY
Start: 2021-04-20 | End: 2021-07-27 | Stop reason: ALTCHOICE

## 2021-07-14 NOTE — PROGRESS NOTES
Assessment/Plan:    1  Kidney stones  Assessment & Plan:  Continue management per urology  Patient should continue Flomax and Cipro as prescribed by urology  Increase water intake and monitor for passing stones during urination  Patient scheduled for procedure tomorrow and Friday  2  Hypokalemia  Assessment & Plan:  Recent BMP shows improvement in potassium  Continue potassium supplement 20 mEq for the next few days  Follow up in 2 weeks to recheck BMP  Orders:  -     Basic metabolic panel; Future      Subjective:      Patient ID: Eugene Perkins is a 61 y o  female  HPI    Patient with PMHx of kidney stones is presenting for TCM after hospitalization  She went to the ED with lower extremity swelling and SOB  Symptoms began a week prior  She was started on lasix and had a cardiac workup which was normal   Echo on 7/4 showed EF 60-65% without valve or motion abnormalities  VQ scan on 7/6 showed low probability of PE  Soon after she noticed she was passing stones in her urine  Urology started following on 7/5  Retroperitoneal US on 7/6 showed calculi in the right kidney and patent ureters  She passed multiple calculi without pain throughout this admission visualized by US (7/6) and CT (7/3)  She was found to have staghorn calculi in her left kidney  She was discharged home in stable condition on antibiotic  She has outpatient procedures scheduled tomorrow and Friday  Patient has procedure left perc nephrolithotomy scheduled on 7/16  She is urinating a lot but is currently comfortable  Patient is taking Cipro 500 mg twice daily at this time  Denies blood in urine  Her flank pain is well controlled  She is having normal bowel movements and good appetite  She denies fever or abdominal pain  While in the hospital her potassium was low and she was started on KCl  Recent BMP shows improvement in level  She will continue potassium at this time and recheck in 2 weeks    Her leg edema and shortness of breath has resolved  She is no longer on lasix  All other ROS negative          The following portions of the patient's history were reviewed and updated as appropriate: allergies, current medications, past family history, past medical history, past social history, past surgical history, and problem list       Current Outpatient Medications:     acetaminophen-codeine (TYLENOL/CODEINE #3) 300-30 MG per tablet, Tylenol-Codeine #3 300 mg-30 mg tablet  Take 1-2 tablets by oral route every 4-6 hours as needed FOR POST OP PAIN, Disp: , Rfl:     calcium citrate (CALCITRATE) 950 MG tablet, Take 1 tablet by mouth 2 (two) times a day, Disp: , Rfl:     cholecalciferol (VITAMIN D3) 33884 units capsule, Take 2 capsules (20,000 Units total) by mouth 2 (two) times a day, Disp: , Rfl: 0    ciprofloxacin (CIPRO) 500 mg tablet, Take 500 mg by mouth 2 (two) times a day, Disp: , Rfl:     colestipol (COLESTID) 1 g tablet, Take 2 tablets (2 g total) by mouth 3 (three) times a day with meals (Patient taking differently: Take 2 g by mouth as needed ), Disp: 540 tablet, Rfl: 3    denosumab (PROLIA) 60 mg/mL, Inject 60 mg under the skin, Disp: , Rfl:     diazepam (VALIUM) 10 mg tablet, , Disp: , Rfl:     diphenhydrAMINE-zinc acetate (BENADRYL) cream, Apply topically 2 (two) times a day as needed, Disp: , Rfl:     diphenoxylate-atropine (LOMOTIL) 2 5-0 025 mg per tablet, 4 tabs twice a day, Disp: 720 tablet, Rfl: 1    fluticasone (FLONASE) 50 mcg/act nasal spray, into each nostril as needed , Disp: , Rfl:     gabapentin (NEURONTIN) 300 mg capsule, Take 2 capsules (600 mg total) by mouth daily at bedtime, Disp: 180 capsule, Rfl: 1    levothyroxine 150 mcg tablet, TAKE 1 TABLET BY MOUTH EVERY DAY (Patient taking differently: Takes 150mcg 6 times a week and take 1 1/2 (225mcg) once a week), Disp: 30 tablet, Rfl: 4    loratadine (CLARITIN) 10 mg tablet, Take 10 mg by mouth daily as needed, Disp: , Rfl:    meclizine (ANTIVERT) 25 mg tablet, Take 1 tablet (25 mg total) by mouth 3 (three) times a day as needed for dizziness, Disp: 30 tablet, Rfl: 0    meloxicam (MOBIC) 15 mg tablet, Take 1 tablet (15 mg total) by mouth every morning, Disp: 90 tablet, Rfl: 3    Multiple Vitamins-Minerals (MULTIVITAMIN ADULT PO), Take by mouth 2 (two) times a day, Disp: , Rfl:     omeprazole (PriLOSEC) 40 MG capsule, Take 1 capsule (40 mg total) by mouth 2 (two) times a day, Disp: 180 capsule, Rfl: 3    oxybutynin (DITROPAN-XL) 10 MG 24 hr tablet, Take 10 mg by mouth, Disp: , Rfl:     oxyCODONE-acetaminophen (PERCOCET) 5-325 mg per tablet, TAKE 1 TO 2 TABLETS BY MOUTH EVERY 6 HOURS AS NEEDED FOR MODERATE PAIN, Disp: , Rfl:     predniSONE 50 mg tablet, PLEASE SEE ATTACHED FOR DETAILED DIRECTIONS, Disp: , Rfl:     SODIUM FLUORIDE, DENTAL GEL, (PREVIDENT) 1 1 % GEL, Take 1 application by mouth, Disp: , Rfl:     tamsulosin (FLOMAX) 0 4 mg, Take 0 4 mg by mouth, Disp: , Rfl:     traMADol (ULTRAM) 50 mg tablet, Take 50 mg by mouth every 6 (six) hours as needed, Disp: , Rfl:     Ascorbic Acid (Vitamin C ER) 500 MG CPCR, TAKE 2 CAPSULES (1,000 MG TOTAL) BY MOUTH DAILY  AFTER SURGERY  (Patient not taking: Reported on 7/14/2021), Disp: , Rfl:     aspirin (ECOTRIN) 325 mg EC tablet, 2 (two) times a day (Patient not taking: Reported on 7/9/2021), Disp: , Rfl:     ergocalciferol (ERGOCALCIFEROL) 1 25 MG (36230 UT) capsule, Take 20,000 Units by mouth (Patient not taking: Reported on 7/14/2021), Disp: , Rfl:     furosemide (LASIX) 20 mg tablet, Take 20 mg by mouth 2 (two) times a day as needed, Disp: , Rfl:     potassium chloride (K-DUR,KLOR-CON) 20 mEq tablet, Take 1 tablet (20 mEq total) by mouth daily (Patient not taking: Reported on 7/9/2021), Disp: 30 tablet, Rfl: 0      Review of Systems   Constitutional: Negative for chills and fever  HENT: Negative for ear pain and sore throat  Eyes: Negative for pain and visual disturbance  Respiratory: Negative for cough and shortness of breath  Cardiovascular: Negative for chest pain and palpitations  Gastrointestinal: Negative for abdominal pain and vomiting  Genitourinary: Positive for frequency  Negative for dysuria, flank pain and hematuria  Musculoskeletal: Negative for arthralgias and back pain  Skin: Negative for color change and rash  Neurological: Negative for seizures and syncope  All other systems reviewed and are negative  Objective:      /74 (BP Location: Left arm, Patient Position: Sitting, Cuff Size: Large)   Pulse 84   Temp 98 7 °F (37 1 °C)   Ht 5' 3 78" (1 62 m)   Wt 104 kg (229 lb)   SpO2 95%   BMI 39 58 kg/m²          Physical Exam  Vitals and nursing note reviewed  Constitutional:       General: She is not in acute distress  Appearance: Normal appearance  She is not ill-appearing  HENT:      Head: Normocephalic and atraumatic  Right Ear: External ear normal       Left Ear: External ear normal    Eyes:      General: No scleral icterus  Extraocular Movements: Extraocular movements intact  Conjunctiva/sclera: Conjunctivae normal       Pupils: Pupils are equal, round, and reactive to light  Neck:      Vascular: No carotid bruit  Cardiovascular:      Rate and Rhythm: Normal rate and regular rhythm  Heart sounds: Normal heart sounds  No murmur heard  Pulmonary:      Effort: Pulmonary effort is normal  No respiratory distress  Breath sounds: Normal breath sounds  No wheezing  Abdominal:      General: Abdomen is flat  Bowel sounds are normal       Palpations: Abdomen is soft  Tenderness: There is no abdominal tenderness  There is no right CVA tenderness or left CVA tenderness  Musculoskeletal:         General: Normal range of motion  Cervical back: Normal range of motion  Right lower leg: No edema  Left lower leg: No edema  Lymphadenopathy:      Cervical: No cervical adenopathy  Skin:     General: Skin is warm and dry  Neurological:      General: No focal deficit present  Mental Status: She is alert and oriented to person, place, and time  Mental status is at baseline  Psychiatric:         Mood and Affect: Mood normal          Behavior: Behavior normal          Thought Content:  Thought content normal

## 2021-07-14 NOTE — ASSESSMENT & PLAN NOTE
Recent BMP shows improvement in potassium  Continue potassium supplement 20 mEq for the next few days  Follow up in 2 weeks to recheck BMP

## 2021-07-14 NOTE — ASSESSMENT & PLAN NOTE
Continue management per urology  Patient should continue Flomax and Cipro as prescribed by urology  Increase water intake and monitor for passing stones during urination  Patient scheduled for procedure tomorrow and Friday

## 2021-07-24 DIAGNOSIS — E87.6 HYPOKALEMIA: ICD-10-CM

## 2021-07-26 ENCOUNTER — APPOINTMENT (OUTPATIENT)
Dept: LAB | Facility: CLINIC | Age: 66
End: 2021-07-26
Payer: COMMERCIAL

## 2021-07-26 DIAGNOSIS — E87.6 HYPOKALEMIA: ICD-10-CM

## 2021-07-26 DIAGNOSIS — D50.8 IRON DEFICIENCY ANEMIA FOLLOWING BARIATRIC SURGERY: ICD-10-CM

## 2021-07-26 DIAGNOSIS — K95.89 IRON DEFICIENCY ANEMIA FOLLOWING BARIATRIC SURGERY: ICD-10-CM

## 2021-07-26 LAB
ANION GAP SERPL CALCULATED.3IONS-SCNC: 6 MMOL/L (ref 4–13)
BASOPHILS # BLD AUTO: 0.06 THOUSANDS/ΜL (ref 0–0.1)
BASOPHILS NFR BLD AUTO: 1 % (ref 0–1)
BUN SERPL-MCNC: 12 MG/DL (ref 5–25)
CALCIUM SERPL-MCNC: 8.4 MG/DL (ref 8.3–10.1)
CHLORIDE SERPL-SCNC: 110 MMOL/L (ref 100–108)
CO2 SERPL-SCNC: 26 MMOL/L (ref 21–32)
CREAT SERPL-MCNC: 0.8 MG/DL (ref 0.6–1.3)
EOSINOPHIL # BLD AUTO: 0.49 THOUSAND/ΜL (ref 0–0.61)
EOSINOPHIL NFR BLD AUTO: 7 % (ref 0–6)
ERYTHROCYTE [DISTWIDTH] IN BLOOD BY AUTOMATED COUNT: 16 % (ref 11.6–15.1)
GFR SERPL CREATININE-BSD FRML MDRD: 78 ML/MIN/1.73SQ M
GLUCOSE SERPL-MCNC: 78 MG/DL (ref 65–140)
HCT VFR BLD AUTO: 34.6 % (ref 34.8–46.1)
HGB BLD-MCNC: 10.6 G/DL (ref 11.5–15.4)
IMM GRANULOCYTES # BLD AUTO: 0.03 THOUSAND/UL (ref 0–0.2)
IMM GRANULOCYTES NFR BLD AUTO: 0 % (ref 0–2)
LYMPHOCYTES # BLD AUTO: 1.81 THOUSANDS/ΜL (ref 0.6–4.47)
LYMPHOCYTES NFR BLD AUTO: 25 % (ref 14–44)
MCH RBC QN AUTO: 30.9 PG (ref 26.8–34.3)
MCHC RBC AUTO-ENTMCNC: 30.6 G/DL (ref 31.4–37.4)
MCV RBC AUTO: 101 FL (ref 82–98)
MONOCYTES # BLD AUTO: 0.64 THOUSAND/ΜL (ref 0.17–1.22)
MONOCYTES NFR BLD AUTO: 9 % (ref 4–12)
NEUTROPHILS # BLD AUTO: 4.25 THOUSANDS/ΜL (ref 1.85–7.62)
NEUTS SEG NFR BLD AUTO: 58 % (ref 43–75)
NRBC BLD AUTO-RTO: 0 /100 WBCS
PLATELET # BLD AUTO: 256 THOUSANDS/UL (ref 149–390)
PMV BLD AUTO: 11.9 FL (ref 8.9–12.7)
POTASSIUM SERPL-SCNC: 3.8 MMOL/L (ref 3.5–5.3)
RBC # BLD AUTO: 3.43 MILLION/UL (ref 3.81–5.12)
SODIUM SERPL-SCNC: 142 MMOL/L (ref 136–145)
WBC # BLD AUTO: 7.28 THOUSAND/UL (ref 4.31–10.16)

## 2021-07-26 PROCEDURE — 36415 COLL VENOUS BLD VENIPUNCTURE: CPT

## 2021-07-26 PROCEDURE — 80048 BASIC METABOLIC PNL TOTAL CA: CPT

## 2021-07-26 PROCEDURE — 85025 COMPLETE CBC W/AUTO DIFF WBC: CPT

## 2021-07-26 NOTE — TELEPHONE ENCOUNTER
Patient is having blood work today to check potassium   Did she have meds to take until today - I just saw RX request

## 2021-07-27 ENCOUNTER — OFFICE VISIT (OUTPATIENT)
Dept: FAMILY MEDICINE CLINIC | Facility: CLINIC | Age: 66
End: 2021-07-27
Payer: COMMERCIAL

## 2021-07-27 VITALS
OXYGEN SATURATION: 93 % | HEIGHT: 64 IN | RESPIRATION RATE: 19 BRPM | TEMPERATURE: 97.8 F | WEIGHT: 231.6 LBS | HEART RATE: 82 BPM | DIASTOLIC BLOOD PRESSURE: 60 MMHG | BODY MASS INDEX: 39.54 KG/M2 | SYSTOLIC BLOOD PRESSURE: 112 MMHG

## 2021-07-27 DIAGNOSIS — E83.51 HYPOCALCEMIA: ICD-10-CM

## 2021-07-27 DIAGNOSIS — E89.0 POSTOPERATIVE HYPOTHYROIDISM: ICD-10-CM

## 2021-07-27 DIAGNOSIS — E55.9 VITAMIN D DEFICIENCY: ICD-10-CM

## 2021-07-27 DIAGNOSIS — D64.9 ANEMIA, UNSPECIFIED TYPE: Primary | ICD-10-CM

## 2021-07-27 DIAGNOSIS — N20.0 KIDNEY STONES: ICD-10-CM

## 2021-07-27 PROCEDURE — 3008F BODY MASS INDEX DOCD: CPT | Performed by: FAMILY MEDICINE

## 2021-07-27 PROCEDURE — 1036F TOBACCO NON-USER: CPT | Performed by: FAMILY MEDICINE

## 2021-07-27 PROCEDURE — 99214 OFFICE O/P EST MOD 30 MIN: CPT | Performed by: FAMILY MEDICINE

## 2021-07-27 RX ORDER — PSEUDOEPHEDRINE HCL 30 MG
TABLET ORAL
Refills: 0
Start: 2021-07-27

## 2021-07-27 RX ORDER — MAG HYDROX/ALUMINUM HYD/SIMETH 400-400-40
20000 SUSPENSION, ORAL (FINAL DOSE FORM) ORAL 2 TIMES DAILY
COMMUNITY

## 2021-07-27 RX ORDER — POTASSIUM CHLORIDE 1500 MG/1
TABLET, EXTENDED RELEASE ORAL
Qty: 30 TABLET | Refills: 0 | Status: SHIPPED | OUTPATIENT
Start: 2021-07-27 | End: 2021-07-27 | Stop reason: ALTCHOICE

## 2021-07-27 NOTE — PROGRESS NOTES
Assessment/Plan:  Patient is seen for follow up to hospitalization  Urosepsis  She is feeling exhausted  She had stent removed yesterday  Patient having stress test on Friday  Stop Klorcon - K is back to 3 8 with diet  Diagnoses and all orders for this visit:    Anemia, unspecified type  -     CBC and differential; Future    Vitamin D deficiency  -     cholecalciferol (VITAMIN D3) 250 MCG (49222 UT) capsule; Take 2 capsules (20,000 Units total) by mouth 2 (two) times a day    Kidney stones    Postoperative hypothyroidism    Hypocalcemia  -     Calcium Citrate 250 MG TABS; Takes two 500 mg tabs twice a day    Other orders  -     Cholecalciferol (Vitamin D3) 125 MCG (5000 UT) CAPS; Take 20,000 Units by mouth 2 (two) times a day  -     calcium citrate-vitamin d (Celebrate Calcium Citrate) 500-500 MG-UNIT CHEW chewable tablet; Chew 2 tablets 2 (two) times a day        Keep regular check up appointment  Subjective:   Chief Complaint   Patient presents with    Follow-up     7/16 left percutaneous nephrolithotomy,  Antegrade Ureterostomy        Patient ID: Nano Vieira is a 61 y o  female  Patient is 51-year-old female seen for follow-up after being hospitalized with urosepsis  The following portions of the patient's history were reviewed and updated as appropriate: allergies, current medications, past family history, past medical history, past social history, past surgical history and problem list     Review of Systems   Constitutional: Negative for chills and fever  HENT: Negative for congestion and sore throat  Respiratory: Negative for chest tightness  Cardiovascular: Negative for chest pain and palpitations  Gastrointestinal: Negative for abdominal pain, constipation, diarrhea and nausea  Genitourinary: Negative for difficulty urinating  Skin: Negative  Neurological: Negative for dizziness and headaches  Psychiatric/Behavioral: Negative            Objective:      /60 (BP Location: Right arm, Patient Position: Sitting, Cuff Size: Adult)   Pulse 82   Temp 97 8 °F (36 6 °C) (Temporal)   Resp 19   Ht 5' 4" (1 626 m)   Wt 105 kg (231 lb 9 6 oz)   SpO2 93%   BMI 39 75 kg/m²          Physical Exam  Vitals and nursing note reviewed  Constitutional:       General: She is not in acute distress  Appearance: She is obese  Neck:      Thyroid: No thyromegaly  Cardiovascular:      Rate and Rhythm: Normal rate and regular rhythm  Heart sounds: Normal heart sounds  Pulmonary:      Effort: Pulmonary effort is normal       Breath sounds: Normal breath sounds  Lymphadenopathy:      Cervical: No cervical adenopathy  Skin:     General: Skin is warm and dry  Neurological:      Mental Status: She is alert and oriented to person, place, and time

## 2021-08-24 ENCOUNTER — APPOINTMENT (OUTPATIENT)
Dept: LAB | Facility: CLINIC | Age: 66
End: 2021-08-24
Payer: COMMERCIAL

## 2021-08-24 ENCOUNTER — TELEPHONE (OUTPATIENT)
Dept: FAMILY MEDICINE CLINIC | Facility: CLINIC | Age: 66
End: 2021-08-24

## 2021-08-24 DIAGNOSIS — E87.6 HYPOKALEMIA: Primary | ICD-10-CM

## 2021-08-24 DIAGNOSIS — D64.9 ANEMIA, UNSPECIFIED TYPE: ICD-10-CM

## 2021-08-24 DIAGNOSIS — E87.6 HYPOKALEMIA: ICD-10-CM

## 2021-08-24 DIAGNOSIS — E83.51 HYPOCALCEMIA: ICD-10-CM

## 2021-08-24 LAB
ALBUMIN SERPL BCP-MCNC: 3.2 G/DL (ref 3.5–5)
ALP SERPL-CCNC: 76 U/L (ref 46–116)
ALT SERPL W P-5'-P-CCNC: 32 U/L (ref 12–78)
ANION GAP SERPL CALCULATED.3IONS-SCNC: 3 MMOL/L (ref 4–13)
AST SERPL W P-5'-P-CCNC: 20 U/L (ref 5–45)
BASOPHILS # BLD AUTO: 0.03 THOUSANDS/ΜL (ref 0–0.1)
BASOPHILS NFR BLD AUTO: 1 % (ref 0–1)
BILIRUB SERPL-MCNC: 0.61 MG/DL (ref 0.2–1)
BUN SERPL-MCNC: 15 MG/DL (ref 5–25)
CALCIUM ALBUM COR SERPL-MCNC: 9 MG/DL (ref 8.3–10.1)
CALCIUM SERPL-MCNC: 8.4 MG/DL (ref 8.3–10.1)
CHLORIDE SERPL-SCNC: 110 MMOL/L (ref 100–108)
CO2 SERPL-SCNC: 26 MMOL/L (ref 21–32)
CREAT SERPL-MCNC: 0.52 MG/DL (ref 0.6–1.3)
EOSINOPHIL # BLD AUTO: 0.18 THOUSAND/ΜL (ref 0–0.61)
EOSINOPHIL NFR BLD AUTO: 3 % (ref 0–6)
ERYTHROCYTE [DISTWIDTH] IN BLOOD BY AUTOMATED COUNT: 14.3 % (ref 11.6–15.1)
GFR SERPL CREATININE-BSD FRML MDRD: 101 ML/MIN/1.73SQ M
GLUCOSE SERPL-MCNC: 125 MG/DL (ref 65–140)
HCT VFR BLD AUTO: 36.1 % (ref 34.8–46.1)
HGB BLD-MCNC: 11.3 G/DL (ref 11.5–15.4)
IMM GRANULOCYTES # BLD AUTO: 0.02 THOUSAND/UL (ref 0–0.2)
IMM GRANULOCYTES NFR BLD AUTO: 0 % (ref 0–2)
LYMPHOCYTES # BLD AUTO: 1.61 THOUSANDS/ΜL (ref 0.6–4.47)
LYMPHOCYTES NFR BLD AUTO: 30 % (ref 14–44)
MCH RBC QN AUTO: 30.2 PG (ref 26.8–34.3)
MCHC RBC AUTO-ENTMCNC: 31.3 G/DL (ref 31.4–37.4)
MCV RBC AUTO: 97 FL (ref 82–98)
MONOCYTES # BLD AUTO: 0.48 THOUSAND/ΜL (ref 0.17–1.22)
MONOCYTES NFR BLD AUTO: 9 % (ref 4–12)
NEUTROPHILS # BLD AUTO: 2.98 THOUSANDS/ΜL (ref 1.85–7.62)
NEUTS SEG NFR BLD AUTO: 57 % (ref 43–75)
NRBC BLD AUTO-RTO: 0 /100 WBCS
PLATELET # BLD AUTO: 223 THOUSANDS/UL (ref 149–390)
PMV BLD AUTO: 11.5 FL (ref 8.9–12.7)
POTASSIUM SERPL-SCNC: 3.6 MMOL/L (ref 3.5–5.3)
PROT SERPL-MCNC: 6.7 G/DL (ref 6.4–8.2)
RBC # BLD AUTO: 3.74 MILLION/UL (ref 3.81–5.12)
SODIUM SERPL-SCNC: 139 MMOL/L (ref 136–145)
WBC # BLD AUTO: 5.3 THOUSAND/UL (ref 4.31–10.16)

## 2021-08-24 PROCEDURE — 85025 COMPLETE CBC W/AUTO DIFF WBC: CPT

## 2021-08-24 PROCEDURE — 36415 COLL VENOUS BLD VENIPUNCTURE: CPT

## 2021-08-24 PROCEDURE — 80053 COMPREHEN METABOLIC PANEL: CPT

## 2021-08-26 ENCOUNTER — TELEPHONE (OUTPATIENT)
Dept: FAMILY MEDICINE CLINIC | Facility: CLINIC | Age: 66
End: 2021-08-26

## 2021-08-27 NOTE — TELEPHONE ENCOUNTER
Call patient  Her potassium was 3 6  I know she stopped the supplement so she should be taking a little more in through her diet as this is just within the normal range  Also her hemoglobin has gone up to 11 3  It was 10 6 last time it was checked

## 2021-08-27 NOTE — TELEPHONE ENCOUNTER
Pt notified  Pt states she is coming in for appt next month  Would like to know if you will put her regular BW orders in for SL and repeat potassium because she is going to try and eat more potassium rich foods for the month and see if that helps

## 2021-08-28 NOTE — TELEPHONE ENCOUNTER
Call patient  I put lab orders in her chart for a lipid panel, chemistry and CBC  I assume the endocrinologist will order her thyroid blood work when needed

## 2021-09-22 ENCOUNTER — APPOINTMENT (OUTPATIENT)
Dept: LAB | Facility: CLINIC | Age: 66
End: 2021-09-22
Payer: COMMERCIAL

## 2021-09-22 DIAGNOSIS — E89.0 POSTOPERATIVE HYPOTHYROIDISM: ICD-10-CM

## 2021-09-22 DIAGNOSIS — E87.6 HYPOKALEMIA: ICD-10-CM

## 2021-09-22 DIAGNOSIS — D64.9 ANEMIA, UNSPECIFIED TYPE: ICD-10-CM

## 2021-09-22 LAB
ALBUMIN SERPL BCP-MCNC: 3.2 G/DL (ref 3.5–5)
ALP SERPL-CCNC: 61 U/L (ref 46–116)
ALT SERPL W P-5'-P-CCNC: 34 U/L (ref 12–78)
ANION GAP SERPL CALCULATED.3IONS-SCNC: 3 MMOL/L (ref 4–13)
AST SERPL W P-5'-P-CCNC: 20 U/L (ref 5–45)
BASOPHILS # BLD AUTO: 0.04 THOUSANDS/ΜL (ref 0–0.1)
BASOPHILS NFR BLD AUTO: 1 % (ref 0–1)
BILIRUB SERPL-MCNC: 0.47 MG/DL (ref 0.2–1)
BUN SERPL-MCNC: 26 MG/DL (ref 5–25)
CALCIUM ALBUM COR SERPL-MCNC: 9.5 MG/DL (ref 8.3–10.1)
CALCIUM SERPL-MCNC: 8.9 MG/DL (ref 8.3–10.1)
CHLORIDE SERPL-SCNC: 105 MMOL/L (ref 100–108)
CHOLEST SERPL-MCNC: 164 MG/DL (ref 50–200)
CO2 SERPL-SCNC: 31 MMOL/L (ref 21–32)
CREAT SERPL-MCNC: 0.64 MG/DL (ref 0.6–1.3)
EOSINOPHIL # BLD AUTO: 0.32 THOUSAND/ΜL (ref 0–0.61)
EOSINOPHIL NFR BLD AUTO: 4 % (ref 0–6)
ERYTHROCYTE [DISTWIDTH] IN BLOOD BY AUTOMATED COUNT: 14 % (ref 11.6–15.1)
GFR SERPL CREATININE-BSD FRML MDRD: 95 ML/MIN/1.73SQ M
GLUCOSE P FAST SERPL-MCNC: 81 MG/DL (ref 65–99)
HCT VFR BLD AUTO: 37.7 % (ref 34.8–46.1)
HDLC SERPL-MCNC: 59 MG/DL
HGB BLD-MCNC: 11.7 G/DL (ref 11.5–15.4)
IMM GRANULOCYTES # BLD AUTO: 0.02 THOUSAND/UL (ref 0–0.2)
IMM GRANULOCYTES NFR BLD AUTO: 0 % (ref 0–2)
LDLC SERPL CALC-MCNC: 80 MG/DL (ref 0–100)
LYMPHOCYTES # BLD AUTO: 2.51 THOUSANDS/ΜL (ref 0.6–4.47)
LYMPHOCYTES NFR BLD AUTO: 34 % (ref 14–44)
MCH RBC QN AUTO: 29.5 PG (ref 26.8–34.3)
MCHC RBC AUTO-ENTMCNC: 31 G/DL (ref 31.4–37.4)
MCV RBC AUTO: 95 FL (ref 82–98)
MONOCYTES # BLD AUTO: 0.81 THOUSAND/ΜL (ref 0.17–1.22)
MONOCYTES NFR BLD AUTO: 11 % (ref 4–12)
NEUTROPHILS # BLD AUTO: 3.66 THOUSANDS/ΜL (ref 1.85–7.62)
NEUTS SEG NFR BLD AUTO: 50 % (ref 43–75)
NRBC BLD AUTO-RTO: 0 /100 WBCS
PLATELET # BLD AUTO: 230 THOUSANDS/UL (ref 149–390)
PMV BLD AUTO: 12.2 FL (ref 8.9–12.7)
POTASSIUM SERPL-SCNC: 3.5 MMOL/L (ref 3.5–5.3)
PROT SERPL-MCNC: 6.4 G/DL (ref 6.4–8.2)
RBC # BLD AUTO: 3.96 MILLION/UL (ref 3.81–5.12)
SODIUM SERPL-SCNC: 139 MMOL/L (ref 136–145)
TRIGL SERPL-MCNC: 123 MG/DL
WBC # BLD AUTO: 7.36 THOUSAND/UL (ref 4.31–10.16)

## 2021-09-22 PROCEDURE — 80061 LIPID PANEL: CPT

## 2021-09-22 PROCEDURE — 80053 COMPREHEN METABOLIC PANEL: CPT

## 2021-09-22 PROCEDURE — 85025 COMPLETE CBC W/AUTO DIFF WBC: CPT

## 2021-09-22 PROCEDURE — 36415 COLL VENOUS BLD VENIPUNCTURE: CPT

## 2021-09-22 RX ORDER — POTASSIUM CHLORIDE 20 MEQ/1
1 TABLET, EXTENDED RELEASE ORAL
COMMUNITY
Start: 2021-08-12 | End: 2021-09-27 | Stop reason: ALTCHOICE

## 2021-09-26 PROBLEM — E87.6 HYPOKALEMIA: Status: RESOLVED | Noted: 2021-07-14 | Resolved: 2021-09-26

## 2021-09-27 ENCOUNTER — OFFICE VISIT (OUTPATIENT)
Dept: FAMILY MEDICINE CLINIC | Facility: CLINIC | Age: 66
End: 2021-09-27
Payer: COMMERCIAL

## 2021-09-27 VITALS
HEART RATE: 75 BPM | BODY MASS INDEX: 39.81 KG/M2 | SYSTOLIC BLOOD PRESSURE: 110 MMHG | HEIGHT: 64 IN | TEMPERATURE: 98.7 F | OXYGEN SATURATION: 95 % | WEIGHT: 233.2 LBS | DIASTOLIC BLOOD PRESSURE: 60 MMHG

## 2021-09-27 DIAGNOSIS — D50.8 IRON DEFICIENCY ANEMIA FOLLOWING BARIATRIC SURGERY: Primary | ICD-10-CM

## 2021-09-27 DIAGNOSIS — K95.89 IRON DEFICIENCY ANEMIA FOLLOWING BARIATRIC SURGERY: Primary | ICD-10-CM

## 2021-09-27 DIAGNOSIS — E87.6 LOW SERUM POTASSIUM: ICD-10-CM

## 2021-09-27 DIAGNOSIS — E89.0 POSTOPERATIVE HYPOTHYROIDISM: ICD-10-CM

## 2021-09-27 DIAGNOSIS — K58.0 IRRITABLE BOWEL SYNDROME WITH DIARRHEA: ICD-10-CM

## 2021-09-27 PROCEDURE — 99214 OFFICE O/P EST MOD 30 MIN: CPT | Performed by: FAMILY MEDICINE

## 2021-09-27 PROCEDURE — 1036F TOBACCO NON-USER: CPT | Performed by: FAMILY MEDICINE

## 2021-09-27 PROCEDURE — 3008F BODY MASS INDEX DOCD: CPT | Performed by: FAMILY MEDICINE

## 2021-09-27 NOTE — PATIENT INSTRUCTIONS
Potassium Content of Foods List   WHAT YOU NEED TO KNOW:   Potassium is a mineral that is found in most foods  Potassium helps to balance fluids and minerals in your body  It also helps your body maintain a normal blood pressure  Potassium helps your muscles contract and your nerves function normally  DISCHARGE INSTRUCTIONS:   Why you may need to change the amount of potassium you eat:   · You may need more potassium  if you have hypokalemia (low potassium levels) or high blood pressure  You may also need more potassium if you are taking diuretics  Diuretics and certain medicines cause your body to lose potassium  · You may need less potassium  in your diet if you have hyperkalemia (high potassium levels) or kidney disease  Potassium content of fruit:  The amount of potassium in milligrams (mg) contained in each fruit or serving of fruit is listed beside the item  · High-potassium foods (more than 200 mg per serving):      ? 1 medium banana (425)    ? ½ of a papaya (390)    ? ½ cup of prune juice (370)    ? ¼ cup of raisins (270)    ? 1 medium hattie (325) or kiwi (240)    ? 1 small orange (240) or ½ cup of orange juice (235)    ? ½ cup of cubed cantaloupe (215) or diced honeydew melon (200)    ? 1 medium pear (200)    · Medium-potassium foods (50 to 200 mg per serving):      ? 1 medium peach (185)    ? 1 small apple or ½ cup of apple juice (150)    ? ½ cup of peaches canned in juice (120)    ? ½ cup of canned pineapple (100)    ? ½ cup of fresh, sliced strawberries (172)    ? ½ cup of watermelon (85)    · Low-potassium foods (less than 50 mg per serving):      ? ½ cup of cranberries (45) or cranberry juice cocktail (20)    ? ½ cup of nectar of papaya, hattie, or pear (35)    Potassium content of vegetables:   · High-potassium foods (more than 200 mg per serving):      ? 1 medium baked potato, with skin (925)    ? 1 baked medium sweet potato, with skin (450)    ?  ½ cup of tomato or vegetable juice (275), or 1 medium raw tomato (290)    ? ½ cup of mushrooms (280)    ? ½ cup of fresh brussels sprouts (250)    ? ½ cup of cooked zucchini (220) or winter squash (250)    ? ¼ of a medium avocado (245)    ? ½ cup of broccoli (230)    · Medium-potassium foods (50 to 200 mg per serving):      ? ½ cup of corn (195)    ? ½ cup of fresh or cooked carrots (180)    ? ½ cup of fresh cauliflower (150)    ? ½ cup of asparagus (155)    ? ½ cup of canned peas (90)     ? 1 cup of lettuce, all types (100)    ? ½ cup of fresh green beans (90)    ? ½ cup of frozen green beans (85)    ? ½ cup of cucumber (80)    Potassium content of protein foods:   · High-potassium foods (more than 200 mg per serving):      ? ½ cup of cooked lockett beans (400) or lentils (365)    ? 1 cup of soy milk (300)    ? 3 ounces of baked or broiled salmon (319)    ? 3 ounces of roasted turkey, dark meat (250)    ? ¼ cup of sunflower seeds (241)    ? 3 ounces of cooked lean beef (224)    ? 2 tablespoons of smooth peanut butter (210)    · Medium-potassium foods (50 to 200 mg per serving):      ? 1 ounce of salted peanuts, almonds, or cashews (200)    ? 1 large egg (60 mg)    Potassium content of dairy foods:   · High-potassium foods (more than 200 mg per serving):      ? 6 ounces of yogurt (260 to 435)    ? 1 cup of nonfat, low-fat, or whole milk (350 to 380)    · Medium-potassium foods (50 to 200 mg per serving):      ? ½ cup of ricotta cheese (154)    ? ½ cup of vanilla ice cream (131)    ?  ½ cup of low-fat (2%) cottage cheese (110)    · Low-potassium foods (less than 50 mg per serving):      ? 1 ounce of cheese (20 to 30)      Potassium content of grains:   · 1 slice of white bread (30)    · ½ cup of white or brown rice (50)    · ½ cup of spaghetti or macaroni (30)    · 1 flour or corn tortilla (50)    · 1 four-inch waffle (50)    Potassium content of other foods:   · 1 tablespoon of molasses (295)    · 1½ ounces of chocolate (165)    · Some salt substitutes may contain a high amount of potassium  Check the food label to find the amount of potassium it contains  © Copyright [employer] 2021 Information is for End User's use only and may not be sold, redistributed or otherwise used for commercial purposes  All illustrations and images included in CareNotes® are the copyrighted property of A D A M [employer] Inc  or Yadira Koenig  The above information is an  only  It is not intended as medical advice for individual conditions or treatments  Talk to your doctor, nurse or pharmacist before following any medical regimen to see if it is safe and effective for you

## 2021-09-27 NOTE — LETTER
September 27, 2021     Patient: Nayeli Martínez   YOB: 1955   Date of Visit: 9/27/2021       To Whom it May Concern:    Tori Jay is under my professional care  She was seen in my office on 9/27/2021  She should continue to work remotely because of her medical condition and increased risk with COVID  If you have any questions or concerns, please don't hesitate to call           Sincerely,          Jannette Rodriguez DO        CC: No Recipients

## 2021-09-27 NOTE — PROGRESS NOTES
Assessment/Plan:    Patient is 74-year-old female seen for follow-up to anemia and hypokalemia  Her blood work from the 22nd shows her potassium is just within normal range  I did give her information on foods that are high in potassium  She does not tolerate the oral supplement  This has been discontinued  Her hemoglobin has improved  Is now 11 7  It had been below 10 when she was hospitalized this summer  Her lipid panel is in range  She follows with endocrinology and [facility] for her thyroid since her surgery  I will see her back in the office in 6 months but she will have a CBC and BMP when she has blood work for her thyroid  Will be seeing Dr Devika Mcgrath for back  Her symptoms have been increasing  Diagnoses and all orders for this visit:    Iron deficiency anemia following bariatric surgery  -     CBC and differential; Future    Irritable bowel syndrome with diarrhea    Postoperative hypothyroidism    Low serum potassium  -     Basic metabolic panel; Future    Other orders  -     Discontinue: potassium chloride (K-DUR,KLOR-CON) 20 mEq tablet; Take 1 tablet by mouth (Patient not taking: Reported on 9/27/2021)          Subjective:   Chief Complaint   Patient presents with    Follow-up     Need work note for today visit        Patient ID: Blair Ambrosio is a 61 y o  female  Patient is here for follow up of anemia  She had been hospitalized with a kidney stone, sepsis  Had signs of heart failure - had echo and it was normal   Follows with urology and cardiology  Sees endocrinology regarding thyroid and parathyroid  Works in [place]      The following portions of the patient's history were reviewed and updated as appropriate: allergies, current medications, past family history, past medical history, past social history, past surgical history and problem list     Review of Systems   Constitutional: Negative for chills and fever  HENT: Negative for congestion and sore throat  Respiratory: Negative for chest tightness  Cardiovascular: Negative for chest pain and palpitations  Gastrointestinal: Negative for abdominal pain, constipation, diarrhea and nausea  Genitourinary: Negative for difficulty urinating  Musculoskeletal: Positive for back pain and gait problem  Skin: Negative  Neurological: Negative for dizziness and headaches  Psychiatric/Behavioral: Negative  Objective:      /60 (BP Location: Right arm, Patient Position: Sitting, Cuff Size: Large)   Pulse 75   Temp 98 7 °F (37 1 °C) (Tympanic)   Ht 5' 4" (1 626 m)   Wt 106 kg (233 lb 3 2 oz)   SpO2 95%   BMI 40 03 kg/m²          Physical Exam  Vitals and nursing note reviewed  Constitutional:       General: She is not in acute distress  Appearance: She is obese  HENT:      Head: Normocephalic  Neck:      Thyroid: No thyromegaly  Vascular: No carotid bruit  Cardiovascular:      Rate and Rhythm: Normal rate and regular rhythm  Heart sounds: Normal heart sounds  Pulmonary:      Effort: Pulmonary effort is normal       Breath sounds: Normal breath sounds  Musculoskeletal:      Right lower leg: No edema  Left lower leg: No edema  Lymphadenopathy:      Cervical: No cervical adenopathy  Skin:     General: Skin is warm and dry  Neurological:      Mental Status: She is alert and oriented to person, place, and time     Psychiatric:         Mood and Affect: Mood normal

## 2021-10-14 DIAGNOSIS — K63.89 BACTERIAL OVERGROWTH SYNDROME: ICD-10-CM

## 2021-10-15 RX ORDER — DIPHENOXYLATE HYDROCHLORIDE AND ATROPINE SULFATE 2.5; .025 MG/1; MG/1
TABLET ORAL
Qty: 720 TABLET | Refills: 1 | Status: SHIPPED | OUTPATIENT
Start: 2021-10-15 | End: 2022-04-01 | Stop reason: SDUPTHER

## 2021-10-23 ENCOUNTER — APPOINTMENT (OUTPATIENT)
Dept: LAB | Facility: CLINIC | Age: 66
End: 2021-10-23
Payer: COMMERCIAL

## 2021-10-23 DIAGNOSIS — K95.89 IRON DEFICIENCY ANEMIA FOLLOWING BARIATRIC SURGERY: ICD-10-CM

## 2021-10-23 DIAGNOSIS — E87.6 LOW SERUM POTASSIUM: ICD-10-CM

## 2021-10-23 DIAGNOSIS — D50.8 IRON DEFICIENCY ANEMIA FOLLOWING BARIATRIC SURGERY: ICD-10-CM

## 2021-10-23 LAB
ANION GAP SERPL CALCULATED.3IONS-SCNC: 1 MMOL/L (ref 4–13)
BASOPHILS # BLD AUTO: 0.04 THOUSANDS/ΜL (ref 0–0.1)
BASOPHILS NFR BLD AUTO: 1 % (ref 0–1)
BUN SERPL-MCNC: 22 MG/DL (ref 5–25)
CALCIUM SERPL-MCNC: 8.7 MG/DL (ref 8.3–10.1)
CHLORIDE SERPL-SCNC: 109 MMOL/L (ref 100–108)
CO2 SERPL-SCNC: 29 MMOL/L (ref 21–32)
CREAT SERPL-MCNC: 0.74 MG/DL (ref 0.6–1.3)
EOSINOPHIL # BLD AUTO: 0.24 THOUSAND/ΜL (ref 0–0.61)
EOSINOPHIL NFR BLD AUTO: 4 % (ref 0–6)
ERYTHROCYTE [DISTWIDTH] IN BLOOD BY AUTOMATED COUNT: 15 % (ref 11.6–15.1)
GFR SERPL CREATININE-BSD FRML MDRD: 86 ML/MIN/1.73SQ M
GLUCOSE P FAST SERPL-MCNC: 75 MG/DL (ref 65–99)
HCT VFR BLD AUTO: 37.2 % (ref 34.8–46.1)
HGB BLD-MCNC: 11.3 G/DL (ref 11.5–15.4)
IMM GRANULOCYTES # BLD AUTO: 0.03 THOUSAND/UL (ref 0–0.2)
IMM GRANULOCYTES NFR BLD AUTO: 1 % (ref 0–2)
LYMPHOCYTES # BLD AUTO: 1.73 THOUSANDS/ΜL (ref 0.6–4.47)
LYMPHOCYTES NFR BLD AUTO: 27 % (ref 14–44)
MCH RBC QN AUTO: 28.5 PG (ref 26.8–34.3)
MCHC RBC AUTO-ENTMCNC: 30.4 G/DL (ref 31.4–37.4)
MCV RBC AUTO: 94 FL (ref 82–98)
MONOCYTES # BLD AUTO: 0.68 THOUSAND/ΜL (ref 0.17–1.22)
MONOCYTES NFR BLD AUTO: 11 % (ref 4–12)
NEUTROPHILS # BLD AUTO: 3.62 THOUSANDS/ΜL (ref 1.85–7.62)
NEUTS SEG NFR BLD AUTO: 56 % (ref 43–75)
NRBC BLD AUTO-RTO: 0 /100 WBCS
PLATELET # BLD AUTO: 237 THOUSANDS/UL (ref 149–390)
PMV BLD AUTO: 12.1 FL (ref 8.9–12.7)
POTASSIUM SERPL-SCNC: 3.5 MMOL/L (ref 3.5–5.3)
RBC # BLD AUTO: 3.97 MILLION/UL (ref 3.81–5.12)
SODIUM SERPL-SCNC: 139 MMOL/L (ref 136–145)
WBC # BLD AUTO: 6.34 THOUSAND/UL (ref 4.31–10.16)

## 2021-10-23 PROCEDURE — 36415 COLL VENOUS BLD VENIPUNCTURE: CPT

## 2021-10-23 PROCEDURE — 85025 COMPLETE CBC W/AUTO DIFF WBC: CPT

## 2021-10-23 PROCEDURE — 80048 BASIC METABOLIC PNL TOTAL CA: CPT

## 2021-10-29 ENCOUNTER — IMMUNIZATIONS (OUTPATIENT)
Dept: FAMILY MEDICINE CLINIC | Facility: CLINIC | Age: 66
End: 2021-10-29
Payer: COMMERCIAL

## 2021-10-29 ENCOUNTER — TELEPHONE (OUTPATIENT)
Dept: FAMILY MEDICINE CLINIC | Facility: CLINIC | Age: 66
End: 2021-10-29

## 2021-10-29 DIAGNOSIS — E87.6 HYPOKALEMIA: Primary | ICD-10-CM

## 2021-10-29 DIAGNOSIS — Z23 NEED FOR VACCINATION: Primary | ICD-10-CM

## 2021-10-29 PROCEDURE — 90682 RIV4 VACC RECOMBINANT DNA IM: CPT | Performed by: FAMILY MEDICINE

## 2021-10-29 PROCEDURE — 90471 IMMUNIZATION ADMIN: CPT | Performed by: FAMILY MEDICINE

## 2021-10-29 RX ORDER — POTASSIUM CHLORIDE 20 MEQ/1
20 TABLET, EXTENDED RELEASE ORAL DAILY
Qty: 30 TABLET | Refills: 5 | Status: SHIPPED | OUTPATIENT
Start: 2021-10-29

## 2021-11-11 ENCOUNTER — TELEPHONE (OUTPATIENT)
Dept: HEMATOLOGY ONCOLOGY | Facility: CLINIC | Age: 66
End: 2021-11-11

## 2021-11-12 ENCOUNTER — TELEPHONE (OUTPATIENT)
Dept: FAMILY MEDICINE CLINIC | Facility: CLINIC | Age: 66
End: 2021-11-12

## 2021-11-12 ENCOUNTER — TELEPHONE (OUTPATIENT)
Dept: HEMATOLOGY ONCOLOGY | Facility: CLINIC | Age: 66
End: 2021-11-12

## 2021-11-13 ENCOUNTER — APPOINTMENT (OUTPATIENT)
Dept: LAB | Facility: CLINIC | Age: 66
End: 2021-11-13
Payer: COMMERCIAL

## 2021-11-13 DIAGNOSIS — D50.9 IRON DEFICIENCY ANEMIA, UNSPECIFIED IRON DEFICIENCY ANEMIA TYPE: ICD-10-CM

## 2021-11-13 LAB
FERRITIN SERPL-MCNC: 10 NG/ML (ref 8–388)
IRON SATN MFR SERPL: 21 % (ref 15–50)
IRON SERPL-MCNC: 84 UG/DL (ref 50–170)
TIBC SERPL-MCNC: 397 UG/DL (ref 250–450)

## 2021-11-13 PROCEDURE — 83540 ASSAY OF IRON: CPT

## 2021-11-13 PROCEDURE — 36415 COLL VENOUS BLD VENIPUNCTURE: CPT

## 2021-11-13 PROCEDURE — 83550 IRON BINDING TEST: CPT

## 2021-11-13 PROCEDURE — 82728 ASSAY OF FERRITIN: CPT

## 2021-11-18 ENCOUNTER — TELEPHONE (OUTPATIENT)
Dept: HEMATOLOGY ONCOLOGY | Facility: CLINIC | Age: 66
End: 2021-11-18

## 2022-01-12 ENCOUNTER — TELEPHONE (OUTPATIENT)
Dept: FAMILY MEDICINE CLINIC | Facility: CLINIC | Age: 67
End: 2022-01-12

## 2022-01-12 NOTE — TELEPHONE ENCOUNTER
Pt called requesting an updated note stating she should continue to work remotely  Pt also requesting labs done in December at [facility]  I see these results, I'm reaching out to [facility]

## 2022-01-14 NOTE — TELEPHONE ENCOUNTER
I spoke to her today and letter in chart so that she can continue to work remotely  Also discussed her blood work and xray studies  Thanks   Daryn Delgadillo

## 2022-03-17 ENCOUNTER — OFFICE VISIT (OUTPATIENT)
Dept: FAMILY MEDICINE CLINIC | Facility: CLINIC | Age: 67
End: 2022-03-17
Payer: COMMERCIAL

## 2022-03-17 ENCOUNTER — TELEPHONE (OUTPATIENT)
Dept: ADMINISTRATIVE | Facility: OTHER | Age: 67
End: 2022-03-17

## 2022-03-17 VITALS
TEMPERATURE: 98 F | OXYGEN SATURATION: 96 % | HEART RATE: 78 BPM | BODY MASS INDEX: 42.72 KG/M2 | WEIGHT: 250.2 LBS | SYSTOLIC BLOOD PRESSURE: 110 MMHG | HEIGHT: 64 IN | DIASTOLIC BLOOD PRESSURE: 70 MMHG

## 2022-03-17 DIAGNOSIS — D50.8 IRON DEFICIENCY ANEMIA FOLLOWING BARIATRIC SURGERY: Primary | ICD-10-CM

## 2022-03-17 DIAGNOSIS — K95.89 IRON DEFICIENCY ANEMIA FOLLOWING BARIATRIC SURGERY: Primary | ICD-10-CM

## 2022-03-17 DIAGNOSIS — E89.0 POSTOPERATIVE HYPOTHYROIDISM: ICD-10-CM

## 2022-03-17 DIAGNOSIS — E87.6 HYPOKALEMIA: ICD-10-CM

## 2022-03-17 DIAGNOSIS — R25.2 LEG CRAMPS: ICD-10-CM

## 2022-03-17 PROCEDURE — 1036F TOBACCO NON-USER: CPT | Performed by: FAMILY MEDICINE

## 2022-03-17 PROCEDURE — 3008F BODY MASS INDEX DOCD: CPT | Performed by: FAMILY MEDICINE

## 2022-03-17 PROCEDURE — 99214 OFFICE O/P EST MOD 30 MIN: CPT | Performed by: FAMILY MEDICINE

## 2022-03-17 NOTE — PROGRESS NOTES
Assessment/Plan:  Patient is following with hematology regarding iron deficiency  She had a DEXA and Prolia is delayed secondary to dental work  Had an MRI in January of low back - stenosis - doing therapy  May need a note that she can go back to work two to three days a week depending on how she feels  Discussed getting another COVID vaccine  Diagnoses and all orders for this visit:    Iron deficiency anemia following bariatric surgery    Postoperative hypothyroidism    Hypokalemia    Leg cramps  -     Magnesium; Future          Subjective:   Chief Complaint   Patient presents with    Follow-up        Patient ID: Ned Kennedy is a 74 y o  female  Patient is here for follow up of chronic conditions  Has had some episodes of vertigo  Has blood work orders from hematology  The following portions of the patient's history were reviewed and updated as appropriate: allergies, current medications, past family history, past medical history, past social history, past surgical history and problem list     Review of Systems   Constitutional: Negative for chills and fever  HENT: Negative for congestion and sore throat  Respiratory: Negative for chest tightness  Cardiovascular: Negative for chest pain and palpitations  Gastrointestinal: Negative for abdominal pain, constipation, diarrhea and nausea  Genitourinary: Negative for difficulty urinating  Skin: Negative  Neurological: Positive for light-headedness  Negative for dizziness and headaches  Psychiatric/Behavioral: Negative  Objective:      /70 (BP Location: Right arm, Patient Position: Sitting, Cuff Size: Standard)   Pulse 78   Temp 98 °F (36 7 °C) (Tympanic)   Ht 5' 4" (1 626 m)   Wt 113 kg (250 lb 3 2 oz)   SpO2 96%   BMI 42 95 kg/m²          Physical Exam  Vitals and nursing note reviewed  Constitutional:       General: She is not in acute distress  Appearance: She is obese     HENT:      Head: Normocephalic  Neck:      Thyroid: No thyromegaly  Cardiovascular:      Rate and Rhythm: Normal rate and regular rhythm  Heart sounds: Normal heart sounds  Pulmonary:      Effort: Pulmonary effort is normal       Breath sounds: Normal breath sounds  Lymphadenopathy:      Cervical: No cervical adenopathy  Skin:     General: Skin is warm and dry  Neurological:      Mental Status: She is alert and oriented to person, place, and time     Psychiatric:         Mood and Affect: Mood normal

## 2022-03-17 NOTE — TELEPHONE ENCOUNTER
----- Message from Kaur Cruz sent at 3/16/2022  1:46 PM EDT -----  Regarding: Care Gap Request  03/16/22 1:46 PM    Hello, our patient Meghan Carrasco has had Mammogram completed/performed  Please assist in updating the patient chart by pulling the Care Everywhere (CE) document  The date of service is 1/2022       Thank you,  Meche Stearns MA  [facility]

## 2022-03-17 NOTE — TELEPHONE ENCOUNTER
Upon review of the In Basket request we were able to locate, review, and update the patient chart as requested for Mammogram  and DEXA    Any additional questions or concerns should be emailed to the Practice Liaisons via [email or web address] email, please do not reply via In Basket      Thank you  Corey Smyth MA

## 2022-03-17 NOTE — TELEPHONE ENCOUNTER
----- Message from Kaur Vieyra sent at 3/16/2022  1:46 PM EDT -----  Regarding: Care Gap Request  03/16/22 1:46 PM    Hello, our patient Inga Quiroz has had DEXA Scan completed/performed  Please assist in updating the patient chart by pulling the Care Everywhere (CE) document  The date of service is 1/2022       Thank you,  Jazmyne Puri MA [facility]

## 2022-03-29 DIAGNOSIS — K22.70 BARRETT'S ESOPHAGUS WITHOUT DYSPLASIA: ICD-10-CM

## 2022-03-29 RX ORDER — OMEPRAZOLE 40 MG/1
CAPSULE, DELAYED RELEASE ORAL
Qty: 180 CAPSULE | Refills: 3 | Status: SHIPPED | OUTPATIENT
Start: 2022-03-29 | End: 2022-05-06 | Stop reason: SDUPTHER

## 2022-04-01 DIAGNOSIS — K63.89 BACTERIAL OVERGROWTH SYNDROME: ICD-10-CM

## 2022-04-01 RX ORDER — DIPHENOXYLATE HYDROCHLORIDE AND ATROPINE SULFATE 2.5; .025 MG/1; MG/1
TABLET ORAL
Qty: 720 TABLET | Refills: 1 | Status: SHIPPED | OUTPATIENT
Start: 2022-04-01 | End: 2022-06-22 | Stop reason: SDUPTHER

## 2022-04-11 DIAGNOSIS — M16.0 PRIMARY OSTEOARTHRITIS OF BOTH HIPS: ICD-10-CM

## 2022-04-12 RX ORDER — MELOXICAM 15 MG/1
TABLET ORAL
Qty: 90 TABLET | Refills: 3 | Status: SHIPPED | OUTPATIENT
Start: 2022-04-12

## 2022-05-06 DIAGNOSIS — K22.70 BARRETT'S ESOPHAGUS WITHOUT DYSPLASIA: ICD-10-CM

## 2022-05-06 RX ORDER — OMEPRAZOLE 40 MG/1
40 CAPSULE, DELAYED RELEASE ORAL 2 TIMES DAILY
Qty: 180 CAPSULE | Refills: 0 | Status: SHIPPED | OUTPATIENT
Start: 2022-05-06 | End: 2022-05-13 | Stop reason: SDUPTHER

## 2022-05-06 NOTE — TELEPHONE ENCOUNTER
Pt asking for new script to local pharmacy, mail order will not arrive in time  Pt requesting 90 day supply

## 2022-05-10 ENCOUNTER — TELEPHONE (OUTPATIENT)
Dept: FAMILY MEDICINE CLINIC | Facility: CLINIC | Age: 67
End: 2022-05-10

## 2022-05-10 RX ORDER — CHLORHEXIDINE GLUCONATE 0.12 MG/ML
RINSE ORAL
COMMUNITY
Start: 2022-04-07

## 2022-05-10 RX ORDER — IBUPROFEN 800 MG/1
800 TABLET ORAL EVERY 8 HOURS PRN
COMMUNITY
Start: 2022-04-07

## 2022-05-10 NOTE — TELEPHONE ENCOUNTER
Pt ELVIS stating she tested covid positive today and would like to know if antivirals are an option   Please call and schedule a virtual

## 2022-05-11 ENCOUNTER — TELEMEDICINE (OUTPATIENT)
Dept: FAMILY MEDICINE CLINIC | Facility: CLINIC | Age: 67
End: 2022-05-11
Payer: COMMERCIAL

## 2022-05-11 DIAGNOSIS — U07.1 COVID-19: Primary | ICD-10-CM

## 2022-05-11 LAB — SARS-COV-2 AG UPPER RESP QL IA: ABNORMAL

## 2022-05-11 PROCEDURE — 99441 PR PHYS/QHP TELEPHONE EVALUATION 5-10 MIN: CPT | Performed by: FAMILY MEDICINE

## 2022-05-11 NOTE — PROGRESS NOTES
COVID-19 Outpatient Progress Note    Assessment/Plan:  Patient is 60-year-old female with positive COVID test   Problem List Items Addressed This Visit    None     Visit Diagnoses     COVID-19    -  Primary    Relevant Medications    nirmatrelvir & ritonavir (Paxlovid) tablet therapy pack         Disposition:     Discussed symptom directed medication options with patient  Discussed vitamin D, vitamin C, and/or zinc supplementation with patient  Patient did a home test which was positive  Patient meets criteria for PAXLOVID and they have been counseled appropriately according to EUA documentation released by the FDA  After discussion, patient agrees to treatment  Teola Sosa is an investigational medicine used to treat mild-to-moderate COVID-19 in adults and children (15years of age and older weighing at least 80 pounds (40 kg)) with positive results of direct SARS-CoV-2 viral testing, and who are at high risk for progression to severe COVID-19, including hospitalization or death  PAXLOVID is investigational because it is still being studied  There is limited information about the safety and effectiveness of using PAXLOVID to treat people with mild-to-moderate COVID-19  The FDA has authorized the emergency use of PAXLOVID for the treatment of mild-tomoderate COVID-19 in adults and children (15years of age and older weighing at least 80 pounds (40 kg)) with a positive test for the virus that causes COVID-19, and who are at high risk for progression to severe COVID-19, including hospitalization or death, under an EUA  What should I tell my healthcare provider before I take PAXLOVID?     Tell your healthcare provider if you:  - Have any allergies  - Have liver or kidney disease  - Are pregnant or plan to become pregnant  - Are breastfeeding a child  - Have any serious illnesses    Tell your healthcare provider about all the medicines you take, including prescription and over-the-counter medicines, vitamins, and herbal supplements  Some medicines may interact with PAXLOVID and may cause serious side effects  Keep a list of your medicines to show your healthcare provider and pharmacist when you get a new medicine  You can ask your healthcare provider or pharmacist for a list of medicines that interact with PAXLOVID  Do not start taking a new medicine without telling your healthcare provider  Your healthcare provider can tell you if it is safe to take PAXLOVID with other medicines  Tell your healthcare provider if you are taking combined hormonal contraceptive  PAXLOVID may affect how your birth control pills work  Females who are able to become pregnant should use another effective alternative form of contraception or an additional barrier method of contraception  Talk to your healthcare provider if you have any questions about contraceptive methods that might be right for you  How do I take PAXLOVID? PAXLOVID consists of 2 medicines: nirmatrelvir and ritonavir  - Take 2 pink tablets of nirmatrelvir with 1 white tablet of ritonavir by mouth 2 times each day (in the morning and in the evening) for 5 days  For each dose, take all 3 tablets at the same time  - If you have kidney disease, talk to your healthcare provider  You may need a different dose  - Swallow the tablets whole  Do not chew, break, or crush the tablets  - Take PAXLOVID with or without food  - Do not stop taking PAXLOVID without talking to your healthcare provider, even if you feel better  - If you miss a dose of PAXLOVID within 8 hours of the time it is usually taken, take it as soon as you remember  If you miss a dose by more than 8 hours, skip the missed dose and take the next dose at your regular time  Do not take 2 doses of PAXLOVID at the same time  - If you take too much PAXLOVID, call your healthcare provider or go to the nearest hospital emergency room right away    - If you are taking a ritonavir- or cobicistat-containing medicine to treat hepatitis C or Human Immunodeficiency Virus (HIV), you should continue to take your medicine as prescribed by your healthcare provider   - Talk to your healthcare provider if you do not feel better or if you feel worse after 5 days  Who should generally not take PAXLOVID? Do not take PAXLOVID if:  You are allergic to nirmatrelvir, ritonavir, or any of the ingredients in PAXLOVID  You are taking any of the following medicines:  - Alfuzosin  - Pethidine, piroxicam, propoxyphene  - Ranolazine  - Amiodarone, dronedarone, flecainide, propafenone, quinidine  - Colchicine  - Lurasidone, pimozide, clozapine  - Dihydroergotamine, ergotamine, methylergonovine  - Lovastatin, simvastatin  - Sildenafil (Revatio®) for pulmonary arterial hypertension (PAH)  - Triazolam, oral midazolam  - Apalutamide  - Carbamazepine, phenobarbital, phenytoin  - Rifampin  - St  Memos Wort (hypericum perforatum)    What are the important possible side effects of PAXLOVID? Possible side effects of PAXLOVID are:  - Liver Problems  Tell your healthcare provider right away if you have any of these signs and symptoms of liver problems: loss of appetite, yellowing of your skin and the whites of eyes (jaundice), dark-colored urine, pale colored stools and itchy skin, stomach area (abdominal) pain  - Resistance to HIV Medicines  If you have untreated HIV infection, PAXLOVID may lead to some HIV medicines not working as well in the future  - Other possible side effects include: altered sense of taste, diarrhea, high blood pressure, or muscle aches    These are not all the possible side effects of PAXLOVID  Not many people have taken PAXLOVID  Serious and unexpected side effects may happen  Danya Cagler is still being studied, so it is possible that all of the risks are not known at this time  What other treatment choices are there?   Like Aaron Madrigal may allow for the emergency use of other medicines to treat people with COVID-19  Go to https://Membersuite/ for information on the emergency use of other medicines that are authorized by FDA to treat people with COVID-19  Your healthcare provider may talk with you about clinical trials for which you may be eligible  It is your choice to be treated or not to be treated with PAXLOVID  Should you decide not to receive it or for your child not to receive it, it will not change your standard medical care  What if I am pregnant or breastfeeding? There is no experience treating pregnant women or breastfeeding mothers with PAXLOVID  For a mother and unborn baby, the benefit of taking PAXLOVID may be greater than the risk from the treatment  If you are pregnant, discuss your options and specific situation with your healthcare provider  It is recommended that you use effective barrier contraception or do not have sexual activity while taking PAXLOVID  If you are breastfeeding, discuss your options and specific situation with your healthcare provider  How do I report side effects with PAXLOVID? Contact your healthcare provider if you have any side effects that bother you or do not go away  Report side effects to FDA MedWatch at www fda gov/medwatch or call 7-514-ZTR9309 or you can report side effects to [employer]  at the contact information provided below  Website Fax number Telephone number   Merlin [phone]     How should I store [address]? Store PAXLOVID tablets at room temperature between 68°F to 77°F (20°C to 25°C)  Full fact sheet for patients, parents, and caregivers can be found at: Evie guajardo    I have spent 10 minutes directly with the patient   Greater than 50% of this time was spent in counseling/coordination of care regarding: diagnostic results and risks and benefits of treatment options  Encounter provider Batsheva Hay DO    Provider located at [place] [address]  [address] [place] [address] [address] [facility] [address] [phone]    Recent Visits  Date Type Provider Dept   05/11/22 EDMUND Araiza DO [facility]   05/10/22 Telephone Desiree Taylor MA [facility]   Showing recent visits within past 7 days and meeting all other requirements  Future Appointments  No visits were found meeting these conditions  Showing future appointments within next 150 days and meeting all other requirements     This virtual check-in was done via telephone and she agrees to proceed  Patient agrees to participate in a virtual check in via telephone or video visit instead of presenting to the office to address urgent/immediate medical needs  Patient is aware this is a billable service  After connecting through Telephone, the patient was identified by name and date of birth  Sunshine Grossman was informed that this was a telemedicine visit and that the exam was being conducted confidentially over secure lines  My office door was closed  No one else was in the room  Sunshine Grossman acknowledged consent and understanding of privacy and security of the telemedicine visit  I informed the patient that I have reviewed her record in Epic and presented the opportunity for her to ask any questions regarding the visit today  The patient agreed to participate  It was my intent to perform this visit via video technology but the patient was not able to do a video connection so the visit was completed via audio telephone only  Verification of patient location:  Patient is located in the following state in which I hold an active license: [place]    Subjective:   Sunshine Grossman is a 74 y o  female who is concerned about COVID-19   Patient's symptoms include fever, chills, fatigue, malaise, nasal congestion, rhinorrhea, cough, chest tightness, diarrhea and headache  Patient denies sore throat, anosmia, loss of taste, shortness of breath, abdominal pain, nausea, vomiting and myalgias  - Date of symptom onset: 5/9/2022  - Date of exposure: 5/7/2022      COVID-19 vaccination status: Fully vaccinated with booster    Exposure:   Contact with a person who is under investigation (PUI) for or who is positive for COVID-19 within the last 14 days?: Yes    Hospitalized recently for fever and/or lower respiratory symptoms?: No      Currently a healthcare worker that is involved in direct patient care?: No      Works in a special setting where the risk of COVID-19 transmission may be high? (this may include long-term care, correctional and [facility] facilities; homeless shelters; assisted-living facilities and group homes ): No      Resident in a special setting where the risk of COVID-19 transmission may be high? (this may include long-term care, correctional and [facility] facilities; homeless shelters; assisted-living facilities and group homes ): No      Daughter had a positive test on 5/8  Pulse 92, Pulse ox 97%    Lab Results   Component Value Date    SARSCOV2 Negative 07/01/2021    [facility] [address] [facility] Not Detected 09/30/2020    [facility] Positive (Patient Reported) (A) 05/10/2022     Past Medical History:   Diagnosis Date    Hypokalemia 7/14/2021    Hypothyroidism     Kidney stones      Past Surgical History:   Procedure Laterality Date    CARPAL TUNNEL RELEASE      both hands    CYSTOSCOPY W/ LASER LITHOTRIPSY      THYROIDECTOMY      TONSILLECTOMY      ULNAR NERVE REPAIR      both arms     Current Outpatient Medications   Medication Sig Dispense Refill    nirmatrelvir & ritonavir (Paxlovid) tablet therapy pack Take 3 tablets by mouth in the morning and 3 tablets in the evening  Do all this for 5 days  Take 2 nirmatrelvir tablets + 1 ritonavir tablet together per dose   30 tablet 0    Calcium Citrate 250 MG TABS Takes two 500 mg tabs twice a day  0    chlorhexidine (PERIDEX) 0 12 % solution RINSE MOUTH WITH 15ML SWISH FOR 1 MINUTE AND SPIT THREE TIMES A DAY   DO NOT SWALLOW      Cholecalciferol (Vitamin D3) 125 MCG (5000 UT) CAPS Take 20,000 Units by mouth 2 (two) times a day      cholecalciferol (VITAMIN D3) 250 MCG (62841 UT) capsule Take 2 capsules (20,000 Units total) by mouth 2 (two) times a day (Patient not taking: Reported on 3/17/2022 )  0    denosumab (PROLIA) 60 mg/mL Inject 60 mg under the skin      diphenhydrAMINE-zinc acetate (BENADRYL) cream Apply topically 2 (two) times a day as needed      diphenoxylate-atropine (LOMOTIL) 2 5-0 025 mg per tablet 4 tabs twice a day 720 tablet 1    fluticasone (FLONASE) 50 mcg/act nasal spray into each nostril as needed       furosemide (LASIX) 20 mg tablet Take 20 mg by mouth 2 (two) times a day as needed      gabapentin (NEURONTIN) 300 mg capsule Take 2 capsules (600 mg total) by mouth daily at bedtime 180 capsule 1    ibuprofen (MOTRIN) 800 mg tablet Take 800 mg by mouth every 8 (eight) hours as needed      levothyroxine 150 mcg tablet TAKE 1 TABLET BY MOUTH EVERY DAY (Patient taking differently: Takes 150mcg 6 times a week and take 1 1/2 (225mcg) once a week) 30 tablet 4    loratadine (CLARITIN) 10 mg tablet Take 10 mg by mouth daily as needed      meclizine (ANTIVERT) 25 mg tablet Take 1 tablet (25 mg total) by mouth 3 (three) times a day as needed for dizziness 30 tablet 0    meloxicam (MOBIC) 15 mg tablet TAKE 1 TABLET EVERY MORNING 90 tablet 3    Multiple Vitamins-Minerals (MULTIVITAMIN ADULT PO) Take by mouth 2 (two) times a day      omeprazole (PriLOSEC) 40 MG capsule Take 1 capsule (40 mg total) by mouth 2 (two) times a day 180 capsule 0    potassium chloride (K-DUR,KLOR-CON) 20 mEq tablet Take 1 tablet (20 mEq total) by mouth daily 30 tablet 5    SODIUM FLUORIDE, DENTAL GEL, (PREVIDENT) 1 1 % GEL Take 1 application by mouth      tamsulosin (FLOMAX) 0 4 mg Take 0 4 mg by mouth (Patient not taking: Reported on 3/17/2022 )       No current facility-administered medications for this visit  Allergies   Allergen Reactions    Contrast [Iodinated Diagnostic Agents] Rash    Ephedrine Other (See Comments)    Gadolinium Derivatives      Other reaction(s): Hives and trouble breathing    Lidocaine      Other reaction(s): heart palpatations, raised blood pressure    Lidocaine-Epinephrine Other (See Comments)    Wound Dressing Adhesive Rash       Review of Systems   Constitutional: Positive for chills, fatigue and fever  HENT: Positive for congestion and rhinorrhea  Negative for sore throat  Respiratory: Positive for cough and chest tightness  Negative for shortness of breath  Gastrointestinal: Positive for diarrhea  Negative for abdominal pain, nausea and vomiting  Musculoskeletal: Negative for myalgias  Neurological: Positive for headaches  Objective: There were no vitals filed for this visit  Physical Exam  Neurological:      Mental Status: She is alert and oriented to person, place, and time  VIRTUAL VISIT Aki Maradiagauizaida Mahoney 1014 verbally agrees to participate in [employer]  Pt is aware that [employer] could be limited without vital signs or the ability to perform a full hands-on physical exam  Estela Mendieta understands she or the provider may request at any time to terminate the video visit and request the patient to seek care or treatment in person

## 2022-05-13 DIAGNOSIS — K22.70 BARRETT'S ESOPHAGUS WITHOUT DYSPLASIA: ICD-10-CM

## 2022-05-13 RX ORDER — OMEPRAZOLE 40 MG/1
40 CAPSULE, DELAYED RELEASE ORAL 2 TIMES DAILY
Qty: 180 CAPSULE | Refills: 1 | Status: SHIPPED | OUTPATIENT
Start: 2022-05-13 | End: 2022-06-21 | Stop reason: SDUPTHER

## 2022-05-18 ENCOUNTER — TELEPHONE (OUTPATIENT)
Dept: FAMILY MEDICINE CLINIC | Facility: CLINIC | Age: 67
End: 2022-05-18

## 2022-05-18 NOTE — TELEPHONE ENCOUNTER
Prior Auth [facility]eprazole [id number] spoke with [facility] approved from 5/18/22-5/18/23 Auth # [id number]

## 2022-06-09 ENCOUNTER — OFFICE VISIT (OUTPATIENT)
Dept: URGENT CARE | Facility: CLINIC | Age: 67
End: 2022-06-09
Payer: COMMERCIAL

## 2022-06-09 VITALS
WEIGHT: 246 LBS | HEIGHT: 63 IN | RESPIRATION RATE: 20 BRPM | DIASTOLIC BLOOD PRESSURE: 78 MMHG | SYSTOLIC BLOOD PRESSURE: 122 MMHG | BODY MASS INDEX: 43.59 KG/M2 | OXYGEN SATURATION: 96 % | TEMPERATURE: 97.7 F | HEART RATE: 80 BPM

## 2022-06-09 DIAGNOSIS — T63.301A SPIDER BITE WOUND, ACCIDENTAL OR UNINTENTIONAL, INITIAL ENCOUNTER: Primary | ICD-10-CM

## 2022-06-09 PROCEDURE — G0382 LEV 3 HOSP TYPE B ED VISIT: HCPCS | Performed by: NURSE PRACTITIONER

## 2022-06-09 NOTE — PATIENT INSTRUCTIONS
Insect Bite or Sting   AMBULATORY CARE:   Most insect bites and stings  are not dangerous and go away without treatment  Common examples of insects that bite or sting are bees, ticks, mosquitoes, spiders, and ants  Insect bites or stings can lead to diseases such as malaria, West Nile virus, Lyme disease, or [place] Spotted Fever  Common signs and symptoms:   Mild symptoms  include a red bump, pain, swelling, and itching  Anaphylaxis symptoms  include throat tightness, trouble breathing, tingling, dizziness, and wheezing  Anaphylaxis is a sudden, life-threatening reaction that needs immediate treatment  Call your local emergency number (911 in the [facility] [facility]) for signs or symptoms of anaphylaxis,  such as trouble breathing, swelling in your mouth or throat, or wheezing  You may also have itching, a rash, hives, or feel like you are going to faint  Seek care immediately if:   You are stung on your tongue or in your throat  A white area forms around the bite  You are sweating badly or have body pain  You think you were bitten or stung by a poisonous insect  Call your doctor if:   You have a fever  The area becomes red, warm, tender, and swollen beyond the area of the bite or sting  You have questions or concerns about your condition or care  Steps to take for signs or symptoms of anaphylaxis:   Immediately  give 1 shot of epinephrine only into the outer thigh muscle  Leave the shot in place  as directed  Your healthcare provider may recommend you leave it in place for up to 10 seconds before you remove it  This helps make sure all of the epinephrine is delivered  Call 911 and go to the emergency department,  even if the shot improved symptoms  Do not drive yourself  Bring the used epinephrine shot with you  Treatment  depends on how severe your symptoms are and if you had anaphylaxis before   You may need any of the following:  Antihistamines  decrease mild symptoms such as itching and rash  Epinephrine  is medicine used to treat severe allergic reactions such as anaphylaxis  A tetanus shot  may be given  The shot is a vaccine that helps prevent a bacterial infection  Tetanus booster shots are usually given every 10 years  If an insect bites or stings you:   Remove the stinger  Scrape the stinger out with your fingernail, edge of a credit card, or a knife blade  Do not squeeze the wound  Gently wash the area with soap and water  Remove the tick  Ticks must be removed as soon as possible so you do not get diseases passed through tick bites  Ask your healthcare provider for more information on tick bites and how to remove ticks  Care for your bite or sting wound:   Elevate (raise) the area above the level of your heart, if possible  Prop the area on pillows to keep it raised comfortably  Elevate the area for 10 to 20 minutes each hour or as directed by your healthcare provider  Apply compresses  Soak a clean washcloth in cold water, wring it out, and put it on the bite or sting  Use the compress for 10 to 20 minutes each hour or as directed by your healthcare provider  After 24 to 48 hours, change to warm compresses  Apply a baking soda paste  Add water to baking soda to make a thick paste  Put the paste on the area for 5 minutes  Rinse gently to remove the paste  Safety precautions to take if you are at risk for anaphylaxis:   Keep 2 shots of epinephrine with you at all times  You may need a second shot, because epinephrine only works for about 20 minutes and symptoms may return  Your healthcare provider can show you and family members how to give the shot  Check the expiration date every month and replace it before it expires  Create an action plan  Your healthcare provider can help you create a written plan that explains the allergy and an emergency plan to treat a reaction   The plan explains when to give a second epinephrine shot if symptoms return or do not improve after the first  Give copies of the action plan and emergency instructions to family members, work and school staff, and  providers  Show them how to give a shot of epinephrine  Carry medical alert identification  Wear medical alert jewelry or carry a card that says you have an insect allergy  Ask your healthcare provider where to get these items  Prevent an insect bite or sting:   Do not wear bright-colored or flower-print clothing when you plan to spend time outdoors  Do not use hairspray, perfumes, or aftershave  Do not leave food out  Empty any standing water  Wash containers with soap and water every 2 days  Put screens on all open windows and doors  Put insect repellent that contains DEET on skin that is showing when you go outside  Put insect repellent at the top of your boots, bottom of pant legs, and sleeve cuffs  Wear long sleeves, pants, and shoes  Use citronella candles outdoors to help keep mosquitoes away  Put a tick and flea collar on pets  Follow up with your doctor as directed:  Write down your questions so you remember to ask them during your visits  © Copyright [employer] 2022 Information is for End User's use only and may not be sold, redistributed or otherwise used for commercial purposes  All illustrations and images included in CareNotes® are the copyrighted property of [employer]  or Yadira Koenig  The above information is an  only  It is not intended as medical advice for individual conditions or treatments  Talk to your doctor, nurse or pharmacist before following any medical regimen to see if it is safe and effective for you

## 2022-06-09 NOTE — PROGRESS NOTES
[facility]        NAME: Terrell Mcfarland is a 74 y o  female  : 1955    MRN: [id number]  DATE: 2022  TIME: 6:59 PM    Assessment and Plan   Spider bite wound, accidental or unintentional, initial encounter [T63 301A]  1  Spider bite wound, accidental or unintentional, initial encounter  mupirocin (BACTROBAN) 2 % ointment     Start topical bactroban - if consolidation noted underneath consider oral antibiotics at that time  Pt in agreement with plan     Patient Instructions     Follow up with PCP in 3-5 days  Proceed to  ER if symptoms worsen  Chief Complaint     Chief Complaint   Patient presents with    Insect Bite     Pt c/o bite on left hip about 1-2 weeks ago  Pt states its red, hot, and painful  Area is firm, red, and round  Pt suspects its a tick bite  Pain 5/10         History of Present Illness   Terrell Mcfarland presents to the clinic c/o    Insect Bite (Pt c/o bite on left hip about 1-2 weeks ago  Pt states its red, hot, and painful  Area is firm, red, and round  Pt suspects its a tick bite  Pain 5/10)  Did apply hydrocortisone cream once with no improvement  Review of Systems   Review of Systems   All other systems reviewed and are negative          Current Medications     Long-Term Medications   Medication Sig Dispense Refill    Calcium Citrate 250 MG TABS Takes two 500 mg tabs twice a day  0    Cholecalciferol (Vitamin D3) 125 MCG (5000 UT) CAPS Take 20,000 Units by mouth 2 (two) times a day      denosumab (PROLIA) 60 mg/mL Inject 60 mg under the skin      fluticasone (FLONASE) 50 mcg/act nasal spray into each nostril as needed       gabapentin (NEURONTIN) 300 mg capsule Take 2 capsules (600 mg total) by mouth daily at bedtime 180 capsule 1    levothyroxine 150 mcg tablet TAKE 1 TABLET BY MOUTH EVERY DAY (Patient taking differently: Takes 150mcg 6 times a week and take 1 1/2 (225mcg) once a week) 30 tablet 4    loratadine (CLARITIN) 10 mg tablet Take 10 mg by mouth daily as needed      meloxicam (MOBIC) 15 mg tablet TAKE 1 TABLET EVERY MORNING 90 tablet 3    mupirocin (BACTROBAN) 2 % ointment Apply topically 3 (three) times a day 22 g 0    omeprazole (PriLOSEC) 40 MG capsule Take 1 capsule (40 mg total) by mouth in the morning and 1 capsule (40 mg total) in the evening   180 capsule 1    potassium chloride (K-DUR,KLOR-CON) 20 mEq tablet Take 1 tablet (20 mEq total) by mouth daily 30 tablet 5    SODIUM FLUORIDE, DENTAL GEL, 1 1 % GEL Take 1 application by mouth      cholecalciferol (VITAMIN D3) 250 MCG (53012 UT) capsule Take 2 capsules (20,000 Units total) by mouth 2 (two) times a day (Patient not taking: No sig reported)  0    furosemide (LASIX) 20 mg tablet Take 20 mg by mouth 2 (two) times a day as needed (Patient not taking: Reported on 6/9/2022)      ibuprofen (MOTRIN) 800 mg tablet Take 800 mg by mouth every 8 (eight) hours as needed (Patient not taking: Reported on 6/9/2022)      meclizine (ANTIVERT) 25 mg tablet Take 1 tablet (25 mg total) by mouth 3 (three) times a day as needed for dizziness (Patient not taking: Reported on 6/9/2022) 30 tablet 0    tamsulosin (FLOMAX) 0 4 mg Take 0 4 mg by mouth (Patient not taking: Reported on 3/17/2022 )         Current Allergies     Allergies as of 06/09/2022 - Reviewed 06/09/2022   Allergen Reaction Noted    Contrast [iodinated diagnostic agents] Rash 08/26/2018    Ephedrine Other (See Comments)     Gadolinium derivatives  03/05/2015    Lidocaine  03/05/2015    Lidocaine-epinephrine Other (See Comments) 05/05/2009    Wound dressing adhesive Rash 11/23/2014            The following portions of the patient's history were reviewed and updated as appropriate: allergies, current medications, past family history, past medical history, past social history, past surgical history and problem list     Objective   /78   Pulse 80   Temp 97 7 °F (36 5 °C)   Resp 20   Ht 5' 3" (1 6 m)   Wt 112 kg (246 lb) SpO2 96%   BMI 43 58 kg/m²        Physical Exam     Physical Exam  Vitals and nursing note reviewed  Constitutional:       Appearance: Normal appearance  She is well-developed  HENT:      Head: Normocephalic and atraumatic  Right Ear: Tympanic membrane, ear canal and external ear normal       Left Ear: Tympanic membrane, ear canal and external ear normal       Nose: Nose normal       Mouth/Throat:      Mouth: Mucous membranes are moist    Eyes:      General: Lids are normal       Conjunctiva/sclera: Conjunctivae normal    Cardiovascular:      Rate and Rhythm: Normal rate and regular rhythm  Heart sounds: Normal heart sounds, S1 normal and S2 normal    Pulmonary:      Effort: Pulmonary effort is normal       Breath sounds: Normal breath sounds  Skin:     General: Skin is warm and dry  Neurological:      Mental Status: She is alert and oriented to person, place, and time  Psychiatric:         Speech: Speech normal          Behavior: Behavior normal  Behavior is cooperative  Thought Content:  Thought content normal          Judgment: Judgment normal

## 2022-06-21 DIAGNOSIS — K63.89 BACTERIAL OVERGROWTH SYNDROME: ICD-10-CM

## 2022-06-21 DIAGNOSIS — K22.70 BARRETT'S ESOPHAGUS WITHOUT DYSPLASIA: ICD-10-CM

## 2022-06-21 RX ORDER — DIPHENOXYLATE HYDROCHLORIDE AND ATROPINE SULFATE 2.5; .025 MG/1; MG/1
TABLET ORAL
Qty: 720 TABLET | Refills: 1 | Status: CANCELLED | OUTPATIENT
Start: 2022-06-21

## 2022-06-22 DIAGNOSIS — K63.89 BACTERIAL OVERGROWTH SYNDROME: ICD-10-CM

## 2022-06-22 RX ORDER — DIPHENOXYLATE HYDROCHLORIDE AND ATROPINE SULFATE 2.5; .025 MG/1; MG/1
TABLET ORAL
Qty: 720 TABLET | Refills: 1 | Status: SHIPPED | OUTPATIENT
Start: 2022-06-22 | End: 2022-06-24 | Stop reason: SDUPTHER

## 2022-06-24 DIAGNOSIS — K63.89 BACTERIAL OVERGROWTH SYNDROME: ICD-10-CM

## 2022-06-24 RX ORDER — OMEPRAZOLE 40 MG/1
40 CAPSULE, DELAYED RELEASE ORAL 2 TIMES DAILY
Qty: 180 CAPSULE | Refills: 1 | Status: SHIPPED | OUTPATIENT
Start: 2022-06-24

## 2022-06-24 NOTE — TELEPHONE ENCOUNTER
Spoke with pt via phone, pt stated her insurance is requesting this as its a new home delivery plan and they need the script and refills on file  Stated this may also happen with her Lomotil

## 2022-06-25 RX ORDER — DIPHENOXYLATE HYDROCHLORIDE AND ATROPINE SULFATE 2.5; .025 MG/1; MG/1
TABLET ORAL
Qty: 720 TABLET | Refills: 1 | Status: SHIPPED | OUTPATIENT
Start: 2022-06-25

## 2022-07-12 DIAGNOSIS — E89.0 POSTOPERATIVE HYPOTHYROIDISM: ICD-10-CM

## 2022-07-12 RX ORDER — LEVOTHYROXINE SODIUM 0.15 MG/1
TABLET ORAL
Qty: 90 TABLET | Refills: 1 | Status: SHIPPED | OUTPATIENT
Start: 2022-07-12 | End: 2022-07-14 | Stop reason: SDUPTHER

## 2022-07-14 DIAGNOSIS — E89.0 POSTOPERATIVE HYPOTHYROIDISM: ICD-10-CM

## 2022-07-14 RX ORDER — LEVOTHYROXINE SODIUM 0.15 MG/1
TABLET ORAL
Qty: 90 TABLET | Refills: 1 | Status: SHIPPED | OUTPATIENT
Start: 2022-07-14 | End: 2022-09-23 | Stop reason: SDUPTHER

## 2022-09-03 ENCOUNTER — OFFICE VISIT (OUTPATIENT)
Dept: FAMILY MEDICINE CLINIC | Facility: CLINIC | Age: 67
End: 2022-09-03
Payer: COMMERCIAL

## 2022-09-03 VITALS
DIASTOLIC BLOOD PRESSURE: 70 MMHG | HEIGHT: 64 IN | SYSTOLIC BLOOD PRESSURE: 118 MMHG | OXYGEN SATURATION: 96 % | TEMPERATURE: 98.4 F | BODY MASS INDEX: 42.1 KG/M2 | RESPIRATION RATE: 19 BRPM | WEIGHT: 246.6 LBS | HEART RATE: 82 BPM

## 2022-09-03 DIAGNOSIS — E89.0 POSTOPERATIVE HYPOTHYROIDISM: Primary | ICD-10-CM

## 2022-09-03 DIAGNOSIS — E55.9 VITAMIN D DEFICIENCY: ICD-10-CM

## 2022-09-03 DIAGNOSIS — M15.9 PRIMARY OSTEOARTHRITIS INVOLVING MULTIPLE JOINTS: ICD-10-CM

## 2022-09-03 DIAGNOSIS — M48.062 SPINAL STENOSIS OF LUMBAR REGION WITH NEUROGENIC CLAUDICATION: ICD-10-CM

## 2022-09-03 DIAGNOSIS — M81.0 OSTEOPOROSIS, POSTMENOPAUSAL: ICD-10-CM

## 2022-09-03 DIAGNOSIS — D50.9 IRON DEFICIENCY ANEMIA, UNSPECIFIED IRON DEFICIENCY ANEMIA TYPE: ICD-10-CM

## 2022-09-03 DIAGNOSIS — Z13.6 ENCOUNTER FOR LIPID SCREENING FOR CARDIOVASCULAR DISEASE: ICD-10-CM

## 2022-09-03 DIAGNOSIS — M81.0 AGE-RELATED OSTEOPOROSIS WITHOUT CURRENT PATHOLOGICAL FRACTURE: ICD-10-CM

## 2022-09-03 DIAGNOSIS — Z13.220 ENCOUNTER FOR LIPID SCREENING FOR CARDIOVASCULAR DISEASE: ICD-10-CM

## 2022-09-03 PROCEDURE — 3288F FALL RISK ASSESSMENT DOCD: CPT | Performed by: FAMILY MEDICINE

## 2022-09-03 PROCEDURE — 1101F PT FALLS ASSESS-DOCD LE1/YR: CPT | Performed by: FAMILY MEDICINE

## 2022-09-03 PROCEDURE — 99214 OFFICE O/P EST MOD 30 MIN: CPT | Performed by: FAMILY MEDICINE

## 2022-09-03 NOTE — PROGRESS NOTES
Assessment/Plan:  Patient is a 74year old female seen for follow up of chronic medical conditions  She continues to see hematology at [facility] for anemia and they have ordered blood work  We will start prescribing thyroid med again -endocrinology is recommending she come back to us  I have given her orders for blood work  Also, her Pain management specialist has left [facility]  I will write for Gabapentin  She is still working in [place] - mostly goes to work two days a week and works from home the other days  Patient may need to call us for a note to be out of work secondary to stress and issues with   She does not feel that she needs any meds for now  BMI Counseling: Body mass index is 42 33 kg/m²  The BMI is above normal  Nutrition recommendations include encouraging healthy choices of fruits and vegetables, moderation in carbohydrate intake, increasing intake of lean protein and reducing intake of saturated and trans fat  Exercise recommendations include moderate physical activity 150 minutes/week  No pharmacotherapy was ordered  Rationale for BMI follow-up plan is due to patient being overweight or obese  Diagnoses and all orders for this visit:    Postoperative hypothyroidism  -     Comprehensive metabolic panel; Future  -     TSH, 3rd generation with Free T4 reflex; Future  -     Lipid Panel with Direct LDL reflex; Future  -     Calcium, ionized; Future    Age-related osteoporosis without current pathological fracture    Iron deficiency anemia, unspecified iron deficiency anemia type    Vitamin D deficiency  -     Vitamin D 25 hydroxy; Future    Primary osteoarthritis involving multiple joints    Spinal stenosis of lumbar region with neurogenic claudication    Osteoporosis, postmenopausal  -     Comprehensive metabolic panel; Future  -     Vitamin D 25 hydroxy; Future  -     Calcium, ionized;  Future    Encounter for lipid screening for cardiovascular disease  -     Comprehensive metabolic panel; Future  -     Lipid Panel with Direct LDL reflex; Future        Subjective:   Chief Complaint   Patient presents with    Follow-up     6 month         Patient ID: Amos Worrell is a 74 y o  female  Patient is seen for follow up of chronic medical conditions  She had COVID in May - was glad she was prescribed Paxlovid  Some emotional -  has been arrested for 20 felonies - she has  from him and has a PFA  She is working in [place] 2 days a week and working from home other days  Patient has had Prolia at prolonged intervals because of implants        The following portions of the patient's history were reviewed and updated as appropriate: allergies, current medications, past family history, past medical history, past social history, past surgical history and problem list     Review of Systems   Constitutional: Positive for fatigue  Negative for chills and fever  HENT: Negative for congestion and sore throat  Respiratory: Negative for chest tightness  Cardiovascular: Negative for chest pain and palpitations  Gastrointestinal: Negative for abdominal pain, constipation, diarrhea and nausea  Genitourinary: Negative for difficulty urinating  Musculoskeletal: Positive for arthralgias, back pain and gait problem  Skin: Negative  Neurological: Negative for dizziness and headaches  Psychiatric/Behavioral: Negative  Stressed         Objective:      /70   Pulse 82   Temp 98 4 °F (36 9 °C) (Tympanic)   Resp 19   Ht 5' 4" (1 626 m)   Wt 112 kg (246 lb 9 6 oz)   SpO2 96%   BMI 42 33 kg/m²          Physical Exam  Vitals and nursing note reviewed  Constitutional:       General: She is not in acute distress  Appearance: She is obese  HENT:      Head: Normocephalic  Neck:      Thyroid: No thyromegaly  Cardiovascular:      Rate and Rhythm: Normal rate and regular rhythm  Heart sounds: Normal heart sounds     Pulmonary:      Effort: Pulmonary effort is normal       Breath sounds: Normal breath sounds  Musculoskeletal:      Right lower leg: No edema  Left lower leg: No edema  Lymphadenopathy:      Cervical: No cervical adenopathy  Skin:     General: Skin is warm and dry  Neurological:      Mental Status: She is alert and oriented to person, place, and time     Psychiatric:         Mood and Affect: Mood normal

## 2022-09-23 DIAGNOSIS — E89.0 POSTOPERATIVE HYPOTHYROIDISM: ICD-10-CM

## 2022-09-23 RX ORDER — LEVOTHYROXINE SODIUM 0.15 MG/1
TABLET ORAL
Qty: 105 TABLET | Refills: 1 | Status: SHIPPED | OUTPATIENT
Start: 2022-09-23 | End: 2022-11-25

## 2022-09-29 DIAGNOSIS — M15.9 GENERALIZED OSTEOARTHRITIS OF MULTIPLE SITES: ICD-10-CM

## 2022-09-29 RX ORDER — GABAPENTIN 300 MG/1
600 CAPSULE ORAL
Qty: 180 CAPSULE | Refills: 1 | Status: SHIPPED | OUTPATIENT
Start: 2022-09-29

## 2022-11-11 ENCOUNTER — TELEPHONE (OUTPATIENT)
Dept: FAMILY MEDICINE CLINIC | Facility: CLINIC | Age: 67
End: 2022-11-11

## 2022-11-11 NOTE — TELEPHONE ENCOUNTER
Pt Malcolm stating that she was seen in September and she needs another letter stating that she needs to work remotely and she will be getting reevaluated in March  She would like a phone call when this letter is ready

## 2022-11-23 ENCOUNTER — APPOINTMENT (OUTPATIENT)
Dept: LAB | Facility: CLINIC | Age: 67
End: 2022-11-23

## 2022-11-23 DIAGNOSIS — E89.0 POSTOPERATIVE HYPOTHYROIDISM: ICD-10-CM

## 2022-11-23 LAB
ANION GAP SERPL CALCULATED.3IONS-SCNC: 5 MMOL/L (ref 4–13)
BUN SERPL-MCNC: 18 MG/DL (ref 5–25)
CALCIUM SERPL-MCNC: 8.7 MG/DL (ref 8.3–10.1)
CHLORIDE SERPL-SCNC: 108 MMOL/L (ref 96–108)
CO2 SERPL-SCNC: 27 MMOL/L (ref 21–32)
CREAT SERPL-MCNC: 0.63 MG/DL (ref 0.6–1.3)
GFR SERPL CREATININE-BSD FRML MDRD: 94 ML/MIN/1.73SQ M
GLUCOSE P FAST SERPL-MCNC: 90 MG/DL (ref 65–99)
POTASSIUM SERPL-SCNC: 3.6 MMOL/L (ref 3.5–5.3)
SODIUM SERPL-SCNC: 140 MMOL/L (ref 135–147)
T4 FREE SERPL-MCNC: 1.5 NG/DL (ref 0.76–1.46)
TSH SERPL DL<=0.05 MIU/L-ACNC: 0.26 UIU/ML (ref 0.45–4.5)

## 2022-11-25 DIAGNOSIS — E89.0 POSTOPERATIVE HYPOTHYROIDISM: Primary | ICD-10-CM

## 2022-11-25 RX ORDER — LEVOTHYROXINE SODIUM 0.15 MG/1
TABLET ORAL
Qty: 105 TABLET | Refills: 1 | Status: SHIPPED
Start: 2022-11-25 | End: 2023-05-30

## 2022-11-25 RX ORDER — LEVOTHYROXINE SODIUM 0.15 MG/1
TABLET ORAL
Qty: 105 TABLET | Refills: 1 | Status: SHIPPED
Start: 2022-11-25 | End: 2022-11-25

## 2022-12-29 DIAGNOSIS — K63.89 BACTERIAL OVERGROWTH SYNDROME: ICD-10-CM

## 2022-12-30 RX ORDER — DIPHENOXYLATE HYDROCHLORIDE AND ATROPINE SULFATE 2.5; .025 MG/1; MG/1
TABLET ORAL
Qty: 720 TABLET | Refills: 1 | Status: SHIPPED | OUTPATIENT
Start: 2022-12-30

## 2023-01-23 ENCOUNTER — TELEPHONE (OUTPATIENT)
Dept: FAMILY MEDICINE CLINIC | Facility: CLINIC | Age: 68
End: 2023-01-23

## 2023-01-23 NOTE — TELEPHONE ENCOUNTER
Pt called presenting with chest tightness, lip numbness and had a headache yesterday  I spoke with Dr Ravinder Jansen and he advised to go to the ER  I informed patient and she asked if there was a place she could go for an EKG because she didn't want to wait in the ER  I told her that the [facility] has [facility] but if it is abnormal she would be sent to the ER and she would have to wait there  She stated that if it was normal she wouldn't have to go to the ER  I repeated that Dr Ravinder Jansen was advising the ER but of course it was her choice

## 2023-01-27 ENCOUNTER — OFFICE VISIT (OUTPATIENT)
Dept: FAMILY MEDICINE CLINIC | Facility: CLINIC | Age: 68
End: 2023-01-27

## 2023-01-27 VITALS
HEART RATE: 80 BPM | TEMPERATURE: 98.3 F | OXYGEN SATURATION: 95 % | DIASTOLIC BLOOD PRESSURE: 80 MMHG | SYSTOLIC BLOOD PRESSURE: 124 MMHG | WEIGHT: 243.2 LBS | BODY MASS INDEX: 41.75 KG/M2

## 2023-01-27 DIAGNOSIS — Z82.49 FAMILY HISTORY OF CARDIOVASCULAR DISEASE: ICD-10-CM

## 2023-01-27 DIAGNOSIS — F41.8 ANXIETY WITH DEPRESSION: Primary | ICD-10-CM

## 2023-01-27 RX ORDER — ESCITALOPRAM OXALATE 5 MG/1
5 TABLET ORAL DAILY
Qty: 30 TABLET | Refills: 2 | Status: SHIPPED | OUTPATIENT
Start: 2023-01-27

## 2023-01-27 RX ORDER — ALPRAZOLAM 0.25 MG/1
0.25 TABLET ORAL 2 TIMES DAILY PRN
Qty: 20 TABLET | Refills: 0 | Status: SHIPPED | OUTPATIENT
Start: 2023-01-27

## 2023-01-27 NOTE — ASSESSMENT & PLAN NOTE
Appears much of her symptoms are related to current life circumstances/stress but are persistent and sx worsening; advised on counseling and agreed to trial of SSRI; xanax PRN for temporary use; reviewed medication use and potential SE; f/u guidance given as well as emergency guidance; recheck with PCP as scheduled in 1 month

## 2023-01-27 NOTE — PATIENT INSTRUCTIONS
Start lexapro  Start counseling  Follow up with cardiology  Follow up with Dr Beata Hensley as scheduled  Call with any worsening symptoms

## 2023-01-27 NOTE — LETTER
January 27, 2023     Patient: Nelly Kya  YOB: 1955  Date of Visit: 1/27/2023      To Whom it May Concern:    Mckinley Alonzo is under my professional care  Lou Hood was seen in my office on 1/27/2023  Lou Hood is to work remotely until May  If you have any questions or concerns, please don't hesitate to call           Sincerely,          Alyse Puri DO        CC: Nelly Kay

## 2023-01-27 NOTE — PROGRESS NOTES
Assessment/Plan:     1  Anxiety with depression  Assessment & Plan:  Appears much of her symptoms are related to current life circumstances/stress but are persistent and sx worsening; advised on counseling and agreed to trial of SSRI; xanax PRN for temporary use; reviewed medication use and potential SE; f/u guidance given as well as emergency guidance; recheck with PCP as scheduled in 1 month    Orders:  -     escitalopram (LEXAPRO) 5 mg tablet; Take 1 tablet (5 mg total) by mouth daily  -     Ambulatory referral to [facility]; Future  -     ALPRAZolam (XANAX) 0 25 mg tablet; Take 1 tablet (0 25 mg total) by mouth 2 (two) times a day as needed for anxiety    2  Family history of cardiovascular disease  Assessment & Plan:  Advised f/u with cardiology as recommended by ER; f/u guidance given          Subjective:      Patient ID: Azucena Neville is a 79 y o  female  Patient is here for ER for follow up for chest pain  Patient feels sx are likely related to a lot of home stress she has been dealing with  Currently going through a divorce and issues with ex- as she has a PFA  Mother has been sick in and out of hospital and is going to long term care  Work has been stressful and requesting her to come to [place] which at this time feels overwhelming  She was cleaning up her house and anniversary was coming up  She felt like everything is coming down on her at once and someone was squeezing something out of her chest    Has FHx of heart disease but had stress test last year that was okay  Labs and EKG were unremarkable  She does feel a lot of it has been due to being overwhelmed  Saw  and helped some  Is here as she realizes she may need some more help to help get her through life circumstances at this time  No SI         The following portions of the patient's history were reviewed and updated as appropriate: allergies, current medications, past family history, past medical history, past social history, past surgical history, and problem list     Review of Systems   Constitutional: Negative for chills and fever  Psychiatric/Behavioral: Positive for dysphoric mood  Negative for suicidal ideas  The patient is nervous/anxious  Objective:      /80 (BP Location: Left arm, Patient Position: Sitting, Cuff Size: Large)   Pulse 80   Temp 98 3 °F (36 8 °C)   Wt 110 kg (243 lb 3 2 oz)   SpO2 95%   BMI 41 75 kg/m²          Physical Exam  Vitals reviewed  Constitutional:       General: She is not in acute distress  Appearance: Normal appearance  She is not ill-appearing, toxic-appearing or diaphoretic  HENT:      Head: Normocephalic and atraumatic  Eyes:      General: No scleral icterus  Right eye: No discharge  Left eye: No discharge  Conjunctiva/sclera: Conjunctivae normal    Cardiovascular:      Rate and Rhythm: Normal rate and regular rhythm  Pulses: Normal pulses  Heart sounds: Normal heart sounds  No murmur heard  No gallop  Pulmonary:      Effort: Pulmonary effort is normal  No respiratory distress  Breath sounds: Normal breath sounds  No stridor  No wheezing, rhonchi or rales  Musculoskeletal:      Right lower leg: No edema  Left lower leg: No edema  Neurological:      General: No focal deficit present  Mental Status: She is alert and oriented to person, place, and time  Psychiatric:         Attention and Perception: Attention normal          Mood and Affect: Mood is anxious and depressed  Speech: Speech normal          Behavior: Behavior normal          Thought Content: Thought content normal          Cognition and Memory: Cognition normal          Judgment: Judgment normal          Depression Screening Follow-up Plan: Patient's depression screening was positive with a PHQ-2 score of 3  Their PHQ-9 score was 11  Patient advised to follow-up with PCP for further management

## 2023-01-31 ENCOUNTER — APPOINTMENT (OUTPATIENT)
Dept: LAB | Facility: CLINIC | Age: 68
End: 2023-01-31

## 2023-01-31 ENCOUNTER — TELEPHONE (OUTPATIENT)
Dept: PSYCHIATRY | Facility: CLINIC | Age: 68
End: 2023-01-31

## 2023-01-31 DIAGNOSIS — E89.0 POSTOPERATIVE HYPOTHYROIDISM: ICD-10-CM

## 2023-01-31 LAB — TSH SERPL DL<=0.05 MIU/L-ACNC: 1.25 UIU/ML (ref 0.45–4.5)

## 2023-02-02 DIAGNOSIS — M15.9 GENERALIZED OSTEOARTHRITIS OF MULTIPLE SITES: ICD-10-CM

## 2023-02-03 DIAGNOSIS — M15.9 GENERALIZED OSTEOARTHRITIS OF MULTIPLE SITES: ICD-10-CM

## 2023-02-03 RX ORDER — GABAPENTIN 300 MG/1
300 CAPSULE ORAL 4 TIMES DAILY
Qty: 360 CAPSULE | Refills: 1 | Status: SHIPPED | OUTPATIENT
Start: 2023-02-03 | End: 2023-06-05 | Stop reason: SDUPTHER

## 2023-02-03 RX ORDER — GABAPENTIN 300 MG/1
600 CAPSULE ORAL
Qty: 180 CAPSULE | Refills: 1 | Status: SHIPPED | OUTPATIENT
Start: 2023-02-03 | End: 2023-02-03 | Stop reason: SDUPTHER

## 2023-02-08 NOTE — TELEPHONE ENCOUNTER
Patient returned voice message from intake and has been placed on referral wait list for talk therapy  Female provider, in person at the [address] or [facility] locations  Confirmed insurance

## 2023-02-10 DIAGNOSIS — K22.70 BARRETT'S ESOPHAGUS WITHOUT DYSPLASIA: ICD-10-CM

## 2023-02-10 RX ORDER — OMEPRAZOLE 40 MG/1
CAPSULE, DELAYED RELEASE ORAL
Qty: 180 CAPSULE | Refills: 3 | Status: SHIPPED | OUTPATIENT
Start: 2023-02-10

## 2023-02-18 DIAGNOSIS — F41.8 ANXIETY WITH DEPRESSION: ICD-10-CM

## 2023-02-18 RX ORDER — ESCITALOPRAM OXALATE 5 MG/1
5 TABLET ORAL DAILY
Qty: 90 TABLET | Refills: 1 | Status: SHIPPED | OUTPATIENT
Start: 2023-02-18

## 2023-03-09 ENCOUNTER — OFFICE VISIT (OUTPATIENT)
Dept: FAMILY MEDICINE CLINIC | Facility: CLINIC | Age: 68
End: 2023-03-09

## 2023-03-09 VITALS
HEIGHT: 64 IN | BODY MASS INDEX: 41.48 KG/M2 | TEMPERATURE: 97.9 F | HEART RATE: 74 BPM | OXYGEN SATURATION: 95 % | DIASTOLIC BLOOD PRESSURE: 70 MMHG | SYSTOLIC BLOOD PRESSURE: 120 MMHG | WEIGHT: 243 LBS | RESPIRATION RATE: 16 BRPM

## 2023-03-09 DIAGNOSIS — M48.062 SPINAL STENOSIS OF LUMBAR REGION WITH NEUROGENIC CLAUDICATION: ICD-10-CM

## 2023-03-09 DIAGNOSIS — F41.8 ANXIETY WITH DEPRESSION: Primary | ICD-10-CM

## 2023-03-09 DIAGNOSIS — E89.0 POSTOPERATIVE HYPOTHYROIDISM: ICD-10-CM

## 2023-03-09 DIAGNOSIS — K58.0 IRRITABLE BOWEL SYNDROME WITH DIARRHEA: ICD-10-CM

## 2023-03-09 DIAGNOSIS — Z23 NEED FOR VACCINATION AGAINST STREPTOCOCCUS PNEUMONIAE: ICD-10-CM

## 2023-03-09 RX ORDER — ESCITALOPRAM OXALATE 10 MG/1
10 TABLET ORAL DAILY
Qty: 90 TABLET | Refills: 1 | Status: SHIPPED | OUTPATIENT
Start: 2023-03-09 | End: 2023-03-29 | Stop reason: SDUPTHER

## 2023-03-09 NOTE — PROGRESS NOTES
Name: Sally Nicholas      : 1955      MRN: [id number]  Encounter Provider: Jacques Last DO  Encounter Date: 3/9/2023   Encounter department: [address] [place] [address]    Assessment & Plan   Patient is seen regarding anxiety/depression  She had been started on 5 mg Escitalopram but after discussion about mood, what is going on in life - will increase to 10 mg daily  1  Anxiety with depression  -     escitalopram (LEXAPRO) 10 mg tablet; Take 1 tablet (10 mg total) by mouth daily    2  Irritable bowel syndrome with diarrhea  Assessment & Plan:  Frequent BM's      3  Postoperative hypothyroidism    4  Spinal stenosis of lumbar region with neurogenic claudication  Assessment & Plan:  Seeing pain management  5  Need for vaccination against Streptococcus pneumoniae  -     Pneumococcal Conjugate Vaccine 20-valent (Pcv20)        Patient will call if she feels that the 10 mg is not helping as much as it should  Subjective      Chief Complaint   Patient presents with   • Follow-up     6 month        Patient is under a lot of stress Mom is ill, divorce, problems at work  Review of Systems   Gastrointestinal: Positive for diarrhea  Musculoskeletal: Positive for back pain  Psychiatric/Behavioral: Positive for decreased concentration, dysphoric mood and sleep disturbance  Negative for self-injury  The patient is nervous/anxious          Current Outpatient Medications on File Prior to Visit   Medication Sig   • ALPRAZolam (XANAX) 0 25 mg tablet Take 1 tablet (0 25 mg total) by mouth 2 (two) times a day as needed for anxiety   • Calcium Citrate 250 MG TABS Takes two 500 mg tabs twice a day   • Cholecalciferol (Vitamin D3) 125 MCG (5000 UT) CAPS Take 20,000 Units by mouth 2 (two) times a day   • denosumab (PROLIA) 60 mg/mL Inject 60 mg under the skin   • diphenoxylate-atropine (LOMOTIL) 2 5-0 025 mg per tablet 4 tabs twice a day   • fluticasone (FLONASE) 50 mcg/act nasal spray into each nostril as "needed    • gabapentin (NEURONTIN) 300 mg capsule Take 1 capsule (300 mg total) by mouth 4 (four) times a day   • levothyroxine 150 mcg tablet Takes 150mcg 5 times a week and take 1 1/2 (225mcg) two  times a week   • loratadine (CLARITIN) 10 mg tablet Take 10 mg by mouth daily as needed   • meloxicam (MOBIC) 15 mg tablet TAKE 1 TABLET EVERY MORNING   • Multiple Vitamins-Minerals (MULTIVITAMIN ADULT PO) Take by mouth 2 (two) times a day   • mupirocin (BACTROBAN) 2 % ointment Apply topically 3 (three) times a day   • omeprazole (PriLOSEC) 40 MG capsule TAKE 1 CAPSULE TWICE A DAY   • potassium chloride (K-DUR,KLOR-CON) 20 mEq tablet Take 1 tablet (20 mEq total) by mouth daily   • SODIUM FLUORIDE, DENTAL GEL, 1 1 % GEL Take 1 application by mouth       Objective     /70   Pulse 74   Temp 97 9 °F (36 6 °C) (Tympanic)   Resp 16   Ht 5' 3 75\" (1 619 m)   Wt 110 kg (243 lb)   SpO2 95%   BMI 42 04 kg/m²     Physical Exam  Vitals and nursing note reviewed  Constitutional:       General: She is not in acute distress  Appearance: She is obese  HENT:      Head: Normocephalic  Cardiovascular:      Rate and Rhythm: Normal rate and regular rhythm  Pulmonary:      Effort: Pulmonary effort is normal       Breath sounds: Normal breath sounds  Musculoskeletal:      Right lower leg: No edema  Left lower leg: No edema  Neurological:      Mental Status: She is alert and oriented to person, place, and time     Psychiatric:         Mood and Affect: Mood normal        Nuha Smart, DO  "

## 2023-03-09 NOTE — LETTER
March 9, 2023     Patient: Ana Bruks  YOB: 1955  Date of Visit: 3/9/2023      To Whom it May Concern:    Emory Perkins is under my professional care  Dorota Celestin was seen in my office on 3/9/2023  I agree with the letter from Dr Glenna Vincent that patient needs to continue to work virtually because of multiple medical conditions  We are adjusting medications which will be re-evaluated at her appointment in June  If you have any questions or concerns, please don't hesitate to call           Sincerely,          Toby Neff DO        CC: No Recipients

## 2023-03-29 ENCOUNTER — TELEPHONE (OUTPATIENT)
Dept: FAMILY MEDICINE CLINIC | Facility: CLINIC | Age: 68
End: 2023-03-29

## 2023-03-29 DIAGNOSIS — E89.0 POSTOPERATIVE HYPOTHYROIDISM: Primary | ICD-10-CM

## 2023-03-29 DIAGNOSIS — F41.8 ANXIETY WITH DEPRESSION: ICD-10-CM

## 2023-03-29 RX ORDER — ESCITALOPRAM OXALATE 20 MG/1
20 TABLET ORAL DAILY
Qty: 90 TABLET | Refills: 1 | Status: SHIPPED | OUTPATIENT
Start: 2023-03-29 | End: 2023-06-05 | Stop reason: SDUPTHER

## 2023-03-31 ENCOUNTER — APPOINTMENT (OUTPATIENT)
Dept: LAB | Facility: CLINIC | Age: 68
End: 2023-03-31

## 2023-03-31 DIAGNOSIS — E89.0 POSTOPERATIVE HYPOTHYROIDISM: ICD-10-CM

## 2023-03-31 DIAGNOSIS — F41.8 ANXIETY WITH DEPRESSION: ICD-10-CM

## 2023-03-31 LAB
ALBUMIN SERPL BCP-MCNC: 3.5 G/DL (ref 3.5–5)
ALP SERPL-CCNC: 55 U/L (ref 46–116)
ALT SERPL W P-5'-P-CCNC: 36 U/L (ref 12–78)
ANION GAP SERPL CALCULATED.3IONS-SCNC: 3 MMOL/L (ref 4–13)
AST SERPL W P-5'-P-CCNC: 29 U/L (ref 5–45)
BASOPHILS # BLD AUTO: 0.04 THOUSANDS/ÂΜL (ref 0–0.1)
BASOPHILS NFR BLD AUTO: 1 % (ref 0–1)
BILIRUB SERPL-MCNC: 0.45 MG/DL (ref 0.2–1)
BUN SERPL-MCNC: 20 MG/DL (ref 5–25)
CALCIUM SERPL-MCNC: 8.6 MG/DL (ref 8.3–10.1)
CHLORIDE SERPL-SCNC: 110 MMOL/L (ref 96–108)
CO2 SERPL-SCNC: 27 MMOL/L (ref 21–32)
CREAT SERPL-MCNC: 0.85 MG/DL (ref 0.6–1.3)
EOSINOPHIL # BLD AUTO: 0.3 THOUSAND/ÂΜL (ref 0–0.61)
EOSINOPHIL NFR BLD AUTO: 5 % (ref 0–6)
ERYTHROCYTE [DISTWIDTH] IN BLOOD BY AUTOMATED COUNT: 14.2 % (ref 11.6–15.1)
GFR SERPL CREATININE-BSD FRML MDRD: 71 ML/MIN/1.73SQ M
GLUCOSE P FAST SERPL-MCNC: 102 MG/DL (ref 65–99)
HCT VFR BLD AUTO: 38.3 % (ref 34.8–46.1)
HGB BLD-MCNC: 12.3 G/DL (ref 11.5–15.4)
IMM GRANULOCYTES # BLD AUTO: 0.02 THOUSAND/UL (ref 0–0.2)
IMM GRANULOCYTES NFR BLD AUTO: 0 % (ref 0–2)
LYMPHOCYTES # BLD AUTO: 1.78 THOUSANDS/ÂΜL (ref 0.6–4.47)
LYMPHOCYTES NFR BLD AUTO: 27 % (ref 14–44)
MCH RBC QN AUTO: 30.2 PG (ref 26.8–34.3)
MCHC RBC AUTO-ENTMCNC: 32.1 G/DL (ref 31.4–37.4)
MCV RBC AUTO: 94 FL (ref 82–98)
MONOCYTES # BLD AUTO: 0.68 THOUSAND/ÂΜL (ref 0.17–1.22)
MONOCYTES NFR BLD AUTO: 10 % (ref 4–12)
NEUTROPHILS # BLD AUTO: 3.71 THOUSANDS/ÂΜL (ref 1.85–7.62)
NEUTS SEG NFR BLD AUTO: 57 % (ref 43–75)
NRBC BLD AUTO-RTO: 0 /100 WBCS
PLATELET # BLD AUTO: 236 THOUSANDS/UL (ref 149–390)
PMV BLD AUTO: 12.4 FL (ref 8.9–12.7)
POTASSIUM SERPL-SCNC: 3.9 MMOL/L (ref 3.5–5.3)
PROT SERPL-MCNC: 6.6 G/DL (ref 6.4–8.4)
RBC # BLD AUTO: 4.07 MILLION/UL (ref 3.81–5.12)
SODIUM SERPL-SCNC: 140 MMOL/L (ref 135–147)
TSH SERPL DL<=0.05 MIU/L-ACNC: 2.48 UIU/ML (ref 0.45–4.5)
WBC # BLD AUTO: 6.53 THOUSAND/UL (ref 4.31–10.16)

## 2023-04-06 DIAGNOSIS — M16.0 PRIMARY OSTEOARTHRITIS OF BOTH HIPS: ICD-10-CM

## 2023-04-06 RX ORDER — MELOXICAM 15 MG/1
TABLET ORAL
Qty: 90 TABLET | Refills: 3 | Status: SHIPPED | OUTPATIENT
Start: 2023-04-06

## 2023-05-30 DIAGNOSIS — E89.0 POSTOPERATIVE HYPOTHYROIDISM: ICD-10-CM

## 2023-05-30 RX ORDER — LEVOTHYROXINE SODIUM 0.15 MG/1
TABLET ORAL
Qty: 105 TABLET | Refills: 1 | Status: SHIPPED | OUTPATIENT
Start: 2023-05-30

## 2023-05-31 ENCOUNTER — TELEPHONE (OUTPATIENT)
Dept: FAMILY MEDICINE CLINIC | Facility: CLINIC | Age: 68
End: 2023-05-31

## 2023-05-31 NOTE — TELEPHONE ENCOUNTER
Pt will dropping of paperwork today or tomorrow that she needs filled out by PCP  Pt is requesting a call from Dr Glendy Stock to talk about the paperwork

## 2023-06-01 ENCOUNTER — TELEPHONE (OUTPATIENT)
Dept: FAMILY MEDICINE CLINIC | Facility: CLINIC | Age: 68
End: 2023-06-01

## 2023-06-01 ENCOUNTER — TELEPHONE (OUTPATIENT)
Dept: PSYCHIATRY | Facility: CLINIC | Age: 68
End: 2023-06-01

## 2023-06-01 NOTE — TELEPHONE ENCOUNTER
Pt dropped off forms for Dr Lien Wyman to fill out  Call pt when ready [phone]  I gave them to Dr Lien Wyman

## 2023-06-05 DIAGNOSIS — M15.9 GENERALIZED OSTEOARTHRITIS OF MULTIPLE SITES: ICD-10-CM

## 2023-06-05 DIAGNOSIS — F41.8 ANXIETY WITH DEPRESSION: ICD-10-CM

## 2023-06-05 DIAGNOSIS — K22.70 BARRETT'S ESOPHAGUS WITHOUT DYSPLASIA: ICD-10-CM

## 2023-06-05 DIAGNOSIS — M16.0 PRIMARY OSTEOARTHRITIS OF BOTH HIPS: ICD-10-CM

## 2023-06-05 DIAGNOSIS — K63.89 BACTERIAL OVERGROWTH SYNDROME: ICD-10-CM

## 2023-06-05 RX ORDER — MELOXICAM 15 MG/1
15 TABLET ORAL EVERY MORNING
Qty: 90 TABLET | Refills: 1 | Status: SHIPPED | OUTPATIENT
Start: 2023-06-05

## 2023-06-05 RX ORDER — ESCITALOPRAM OXALATE 20 MG/1
20 TABLET ORAL DAILY
Qty: 90 TABLET | Refills: 1 | Status: SHIPPED | OUTPATIENT
Start: 2023-06-05

## 2023-06-05 RX ORDER — DIPHENOXYLATE HYDROCHLORIDE AND ATROPINE SULFATE 2.5; .025 MG/1; MG/1
TABLET ORAL
Qty: 720 TABLET | Refills: 1 | Status: SHIPPED | OUTPATIENT
Start: 2023-06-05

## 2023-06-05 RX ORDER — OMEPRAZOLE 40 MG/1
40 CAPSULE, DELAYED RELEASE ORAL 2 TIMES DAILY
Qty: 180 CAPSULE | Refills: 1 | Status: SHIPPED | OUTPATIENT
Start: 2023-06-05

## 2023-06-05 RX ORDER — GABAPENTIN 300 MG/1
300 CAPSULE ORAL 4 TIMES DAILY
Qty: 360 CAPSULE | Refills: 1 | Status: SHIPPED | OUTPATIENT
Start: 2023-06-05

## 2023-07-10 ENCOUNTER — OFFICE VISIT (OUTPATIENT)
Dept: FAMILY MEDICINE CLINIC | Facility: CLINIC | Age: 68
End: 2023-07-10
Payer: MEDICARE

## 2023-07-10 VITALS
HEIGHT: 64 IN | OXYGEN SATURATION: 94 % | BODY MASS INDEX: 40.46 KG/M2 | TEMPERATURE: 97.8 F | HEART RATE: 72 BPM | WEIGHT: 237 LBS | DIASTOLIC BLOOD PRESSURE: 70 MMHG | SYSTOLIC BLOOD PRESSURE: 130 MMHG

## 2023-07-10 DIAGNOSIS — Z13.220 ENCOUNTER FOR LIPID SCREENING FOR CARDIOVASCULAR DISEASE: ICD-10-CM

## 2023-07-10 DIAGNOSIS — Z13.6 ENCOUNTER FOR LIPID SCREENING FOR CARDIOVASCULAR DISEASE: ICD-10-CM

## 2023-07-10 DIAGNOSIS — E55.9 VITAMIN D DEFICIENCY: ICD-10-CM

## 2023-07-10 DIAGNOSIS — E89.0 POSTOPERATIVE HYPOTHYROIDISM: Primary | ICD-10-CM

## 2023-07-10 DIAGNOSIS — D50.9 IRON DEFICIENCY ANEMIA, UNSPECIFIED IRON DEFICIENCY ANEMIA TYPE: ICD-10-CM

## 2023-07-10 DIAGNOSIS — F41.8 ANXIETY WITH DEPRESSION: ICD-10-CM

## 2023-07-10 PROCEDURE — 99214 OFFICE O/P EST MOD 30 MIN: CPT | Performed by: FAMILY MEDICINE

## 2023-07-10 NOTE — PROGRESS NOTES
Name: Esau Tracy      : 1955      MRN: [id number]  Encounter Provider: Akil Colon DO  Encounter Date: 7/10/2023   Encounter department: [address] [place] [address]   Patient is seen for follow up of thyroid and anemia. She continues with Escitalopram for depression/anxiety. 1. Postoperative hypothyroidism  -     Comprehensive metabolic panel; Future; Expected date: 2023  -     TSH, 3rd generation with Free T4 reflex; Future; Expected date: 2023    2. Anxiety with depression    3. Vitamin D deficiency  -     Vitamin D 25 hydroxy; Future    4. Iron deficiency anemia, unspecified iron deficiency anemia type  -     CBC and differential; Future; Expected date: 2023  -     Iron Panel (Includes Ferritin, Iron Sat%, Iron, and TIBC); Future; Expected date: 2023    5. Encounter for lipid screening for cardiovascular disease  -     Lipid Panel with Direct LDL reflex; Future; Expected date: 2023    Next visit is Medicare visit. Blood work orders to be done prior to visit. Subjective      Chief Complaint   Patient presents with   • Follow-up     3 month follow up. Patient is here for follow up of anxiety/depression, thyroid. She is filing a lawsuit against her workplace for firing her. Her home was sold but she can't look for a new place because her divorce settlement has not gone through yet. Review of Systems   Constitutional: Negative for chills and fever. HENT: Negative for congestion and sore throat. Respiratory: Negative for chest tightness. Cardiovascular: Negative for chest pain and palpitations. Gastrointestinal: Negative for abdominal pain, constipation, diarrhea and nausea. Genitourinary: Negative for difficulty urinating. Skin: Negative. Neurological: Negative for dizziness and headaches. Psychiatric/Behavioral: Positive for decreased concentration. The patient is nervous/anxious.         Current Outpatient Medications on File Prior to Visit   Medication Sig   • ALPRAZolam (XANAX) 0.25 mg tablet Take 1 tablet (0.25 mg total) by mouth 2 (two) times a day as needed for anxiety   • Calcium Citrate 250 MG TABS Takes two 500 mg tabs twice a day   • Cholecalciferol (Vitamin D3) 125 MCG (5000 UT) CAPS Take 20,000 Units by mouth 2 (two) times a day   • denosumab (PROLIA) 60 mg/mL Inject 60 mg under the skin   • diphenoxylate-atropine (LOMOTIL) 2.5-0.025 mg per tablet 4 tabs twice a day   • escitalopram (LEXAPRO) 20 mg tablet Take 1 tablet (20 mg total) by mouth daily   • fluticasone (FLONASE) 50 mcg/act nasal spray into each nostril as needed    • gabapentin (NEURONTIN) 300 mg capsule Take 1 capsule (300 mg total) by mouth 4 (four) times a day   • levothyroxine 150 mcg tablet TAKES 150MCG 4 TIMES A WEEK AND TAKE 1 1/2 (225MCG) THREE TIMES A WEEK   • loratadine (CLARITIN) 10 mg tablet Take 10 mg by mouth daily as needed   • meloxicam (MOBIC) 15 mg tablet Take 1 tablet (15 mg total) by mouth every morning   • Multiple Vitamins-Minerals (MULTIVITAMIN ADULT PO) Take by mouth 2 (two) times a day   • omeprazole (PriLOSEC) 40 MG capsule Take 1 capsule (40 mg total) by mouth 2 (two) times a day   • SODIUM FLUORIDE, DENTAL GEL, 1.1 % GEL Take 1 application by mouth   • mupirocin (BACTROBAN) 2 % ointment Apply topically 3 (three) times a day (Patient not taking: Reported on 7/10/2023)   • potassium chloride (K-DUR,KLOR-CON) 20 mEq tablet Take 1 tablet (20 mEq total) by mouth daily (Patient not taking: Reported on 7/10/2023)       Objective     /70 (BP Location: Left arm, Patient Position: Sitting, Cuff Size: Large)   Pulse 72   Temp 97.8 °F (36.6 °C)   Ht 5' 3.75" (1.619 m)   Wt 108 kg (237 lb)   SpO2 94%   BMI 41.00 kg/m²     Physical Exam  Vitals and nursing note reviewed. Constitutional:       General: She is not in acute distress. Appearance: She is obese. HENT:      Head: Normocephalic.    Neck:      Thyroid: No thyromegaly. Vascular: No carotid bruit. Cardiovascular:      Rate and Rhythm: Normal rate and regular rhythm. Heart sounds: Normal heart sounds. Pulmonary:      Effort: Pulmonary effort is normal.      Breath sounds: Normal breath sounds. Musculoskeletal:      Right lower leg: No edema. Left lower leg: No edema. Lymphadenopathy:      Cervical: No cervical adenopathy. Skin:     General: Skin is warm and dry. Neurological:      Mental Status: She is alert and oriented to person, place, and time.    Psychiatric:         Mood and Affect: Mood normal.       Lester Carias DO

## 2023-09-29 ENCOUNTER — APPOINTMENT (OUTPATIENT)
Dept: LAB | Facility: CLINIC | Age: 68
End: 2023-09-29
Payer: MEDICARE

## 2023-09-29 DIAGNOSIS — E89.0 POSTOPERATIVE HYPOTHYROIDISM: ICD-10-CM

## 2023-09-29 DIAGNOSIS — Z13.6 ENCOUNTER FOR LIPID SCREENING FOR CARDIOVASCULAR DISEASE: ICD-10-CM

## 2023-09-29 DIAGNOSIS — Z13.220 ENCOUNTER FOR LIPID SCREENING FOR CARDIOVASCULAR DISEASE: ICD-10-CM

## 2023-09-29 DIAGNOSIS — E55.9 VITAMIN D DEFICIENCY: ICD-10-CM

## 2023-09-29 DIAGNOSIS — D50.9 IRON DEFICIENCY ANEMIA, UNSPECIFIED IRON DEFICIENCY ANEMIA TYPE: ICD-10-CM

## 2023-09-29 LAB
25(OH)D3 SERPL-MCNC: 79.6 NG/ML (ref 30–100)
ALBUMIN SERPL BCP-MCNC: 3.7 G/DL (ref 3.5–5)
ALP SERPL-CCNC: 72 U/L (ref 34–104)
ALT SERPL W P-5'-P-CCNC: 26 U/L (ref 7–52)
ANION GAP SERPL CALCULATED.3IONS-SCNC: 8 MMOL/L
AST SERPL W P-5'-P-CCNC: 24 U/L (ref 13–39)
BASOPHILS # BLD AUTO: 0.03 THOUSANDS/ÂΜL (ref 0–0.1)
BASOPHILS NFR BLD AUTO: 1 % (ref 0–1)
BILIRUB SERPL-MCNC: 0.58 MG/DL (ref 0.2–1)
BUN SERPL-MCNC: 13 MG/DL (ref 5–25)
CALCIUM SERPL-MCNC: 8.1 MG/DL (ref 8.4–10.2)
CHLORIDE SERPL-SCNC: 105 MMOL/L (ref 96–108)
CHOLEST SERPL-MCNC: 151 MG/DL
CO2 SERPL-SCNC: 30 MMOL/L (ref 21–32)
CREAT SERPL-MCNC: 0.67 MG/DL (ref 0.6–1.3)
EOSINOPHIL # BLD AUTO: 0.28 THOUSAND/ÂΜL (ref 0–0.61)
EOSINOPHIL NFR BLD AUTO: 5 % (ref 0–6)
ERYTHROCYTE [DISTWIDTH] IN BLOOD BY AUTOMATED COUNT: 14.8 % (ref 11.6–15.1)
FERRITIN SERPL-MCNC: 22 NG/ML (ref 11–307)
GFR SERPL CREATININE-BSD FRML MDRD: 91 ML/MIN/1.73SQ M
GLUCOSE P FAST SERPL-MCNC: 83 MG/DL (ref 65–99)
HCT VFR BLD AUTO: 39 % (ref 34.8–46.1)
HDLC SERPL-MCNC: 58 MG/DL
HGB BLD-MCNC: 12.2 G/DL (ref 11.5–15.4)
IMM GRANULOCYTES # BLD AUTO: 0.01 THOUSAND/UL (ref 0–0.2)
IMM GRANULOCYTES NFR BLD AUTO: 0 % (ref 0–2)
IRON SATN MFR SERPL: 20 % (ref 15–50)
IRON SERPL-MCNC: 65 UG/DL (ref 50–212)
LDLC SERPL CALC-MCNC: 74 MG/DL (ref 0–100)
LYMPHOCYTES # BLD AUTO: 1.93 THOUSANDS/ÂΜL (ref 0.6–4.47)
LYMPHOCYTES NFR BLD AUTO: 32 % (ref 14–44)
MCH RBC QN AUTO: 29.4 PG (ref 26.8–34.3)
MCHC RBC AUTO-ENTMCNC: 31.3 G/DL (ref 31.4–37.4)
MCV RBC AUTO: 94 FL (ref 82–98)
MONOCYTES # BLD AUTO: 0.56 THOUSAND/ÂΜL (ref 0.17–1.22)
MONOCYTES NFR BLD AUTO: 9 % (ref 4–12)
NEUTROPHILS # BLD AUTO: 3.14 THOUSANDS/ÂΜL (ref 1.85–7.62)
NEUTS SEG NFR BLD AUTO: 53 % (ref 43–75)
NRBC BLD AUTO-RTO: 0 /100 WBCS
PLATELET # BLD AUTO: 215 THOUSANDS/UL (ref 149–390)
PMV BLD AUTO: 11.9 FL (ref 8.9–12.7)
POTASSIUM SERPL-SCNC: 3.9 MMOL/L (ref 3.5–5.3)
PROT SERPL-MCNC: 6.2 G/DL (ref 6.4–8.4)
RBC # BLD AUTO: 4.15 MILLION/UL (ref 3.81–5.12)
SODIUM SERPL-SCNC: 143 MMOL/L (ref 135–147)
T4 FREE SERPL-MCNC: 1.31 NG/DL (ref 0.61–1.12)
TIBC SERPL-MCNC: 322 UG/DL (ref 250–450)
TRIGL SERPL-MCNC: 95 MG/DL
TSH SERPL DL<=0.05 MIU/L-ACNC: 0.18 UIU/ML (ref 0.45–4.5)
UIBC SERPL-MCNC: 257 UG/DL (ref 155–355)
WBC # BLD AUTO: 5.95 THOUSAND/UL (ref 4.31–10.16)

## 2023-09-29 PROCEDURE — 84439 ASSAY OF FREE THYROXINE: CPT

## 2023-09-29 PROCEDURE — 80061 LIPID PANEL: CPT

## 2023-09-29 PROCEDURE — 82728 ASSAY OF FERRITIN: CPT

## 2023-09-29 PROCEDURE — 36415 COLL VENOUS BLD VENIPUNCTURE: CPT

## 2023-09-29 PROCEDURE — 82306 VITAMIN D 25 HYDROXY: CPT

## 2023-09-29 PROCEDURE — 80053 COMPREHEN METABOLIC PANEL: CPT

## 2023-09-29 PROCEDURE — 83550 IRON BINDING TEST: CPT

## 2023-09-29 PROCEDURE — 83540 ASSAY OF IRON: CPT

## 2023-09-29 PROCEDURE — 84443 ASSAY THYROID STIM HORMONE: CPT

## 2023-09-29 PROCEDURE — 85025 COMPLETE CBC W/AUTO DIFF WBC: CPT

## 2023-10-04 ENCOUNTER — RA CDI HCC (OUTPATIENT)
Dept: OTHER | Facility: HOSPITAL | Age: 68
End: 2023-10-04

## 2023-10-04 NOTE — PROGRESS NOTES
[facility] coding opportunities          Chart Reviewed number of suggestions sent to Provider: 1  E66.01     Patients Insurance     Medicare Insurance: [employer]

## 2023-10-11 ENCOUNTER — OFFICE VISIT (OUTPATIENT)
Dept: FAMILY MEDICINE CLINIC | Facility: CLINIC | Age: 68
End: 2023-10-11
Payer: MEDICARE

## 2023-10-11 VITALS
DIASTOLIC BLOOD PRESSURE: 60 MMHG | SYSTOLIC BLOOD PRESSURE: 120 MMHG | BODY MASS INDEX: 40.46 KG/M2 | OXYGEN SATURATION: 97 % | HEIGHT: 64 IN | TEMPERATURE: 98.5 F | WEIGHT: 237 LBS | HEART RATE: 70 BPM

## 2023-10-11 DIAGNOSIS — E83.51 HYPOCALCEMIA: ICD-10-CM

## 2023-10-11 DIAGNOSIS — M46.08 SPINAL ENTHESOPATHY, SACRAL AND SACROCOCCYGEAL REGION (HCC): ICD-10-CM

## 2023-10-11 DIAGNOSIS — K63.89 BACTERIAL OVERGROWTH SYNDROME: ICD-10-CM

## 2023-10-11 DIAGNOSIS — Z59.9 FINANCIAL DIFFICULTIES: ICD-10-CM

## 2023-10-11 DIAGNOSIS — M15.9 GENERALIZED OSTEOARTHRITIS OF MULTIPLE SITES: ICD-10-CM

## 2023-10-11 DIAGNOSIS — K22.70 BARRETT'S ESOPHAGUS WITHOUT DYSPLASIA: ICD-10-CM

## 2023-10-11 DIAGNOSIS — Z23 ENCOUNTER FOR IMMUNIZATION: ICD-10-CM

## 2023-10-11 DIAGNOSIS — E89.0 POSTOPERATIVE HYPOTHYROIDISM: Primary | ICD-10-CM

## 2023-10-11 DIAGNOSIS — M16.0 PRIMARY OSTEOARTHRITIS OF BOTH HIPS: ICD-10-CM

## 2023-10-11 DIAGNOSIS — Z00.00 WELCOME TO MEDICARE PREVENTIVE VISIT: ICD-10-CM

## 2023-10-11 DIAGNOSIS — F41.8 ANXIETY WITH DEPRESSION: ICD-10-CM

## 2023-10-11 PROCEDURE — G0008 ADMIN INFLUENZA VIRUS VAC: HCPCS

## 2023-10-11 PROCEDURE — G0402 INITIAL PREVENTIVE EXAM: HCPCS | Performed by: FAMILY MEDICINE

## 2023-10-11 PROCEDURE — 99214 OFFICE O/P EST MOD 30 MIN: CPT | Performed by: FAMILY MEDICINE

## 2023-10-11 PROCEDURE — 90662 IIV NO PRSV INCREASED AG IM: CPT

## 2023-10-11 RX ORDER — OMEPRAZOLE 40 MG/1
40 CAPSULE, DELAYED RELEASE ORAL 2 TIMES DAILY
Qty: 180 CAPSULE | Refills: 1 | Status: SHIPPED | OUTPATIENT
Start: 2023-10-11

## 2023-10-11 RX ORDER — ALPRAZOLAM 0.25 MG/1
0.25 TABLET ORAL 2 TIMES DAILY PRN
Qty: 20 TABLET | Refills: 0 | Status: SHIPPED | OUTPATIENT
Start: 2023-10-11

## 2023-10-11 RX ORDER — GABAPENTIN 300 MG/1
300 CAPSULE ORAL 4 TIMES DAILY
Qty: 360 CAPSULE | Refills: 1 | Status: SHIPPED | OUTPATIENT
Start: 2023-10-11

## 2023-10-11 RX ORDER — ESCITALOPRAM OXALATE 20 MG/1
20 TABLET ORAL DAILY
Qty: 90 TABLET | Refills: 1 | Status: SHIPPED | OUTPATIENT
Start: 2023-10-11

## 2023-10-11 RX ORDER — MELOXICAM 15 MG/1
15 TABLET ORAL EVERY MORNING
Qty: 90 TABLET | Refills: 1 | Status: SHIPPED | OUTPATIENT
Start: 2023-10-11

## 2023-10-11 RX ORDER — LEVOTHYROXINE SODIUM 0.15 MG/1
TABLET ORAL
Qty: 105 TABLET | Refills: 1 | Status: SHIPPED | OUTPATIENT
Start: 2023-10-11

## 2023-10-11 RX ORDER — DIPHENOXYLATE HYDROCHLORIDE AND ATROPINE SULFATE 2.5; .025 MG/1; MG/1
TABLET ORAL
Qty: 720 TABLET | Refills: 1 | Status: SHIPPED | OUTPATIENT
Start: 2023-10-11

## 2023-10-11 SDOH — ECONOMIC STABILITY - INCOME SECURITY: PROBLEM RELATED TO HOUSING AND ECONOMIC CIRCUMSTANCES, UNSPECIFIED: Z59.9

## 2023-10-11 NOTE — PROGRESS NOTES
Assessment and Plan:     Problem List Items Addressed This Visit     Bacterial overgrowth syndrome    Relevant Medications    diphenoxylate-atropine (LOMOTIL) 2.5-0.025 mg per tablet    Generalized osteoarthritis of multiple sites    Relevant Medications    gabapentin (NEURONTIN) 300 mg capsule    Postoperative hypothyroidism - Primary    Relevant Medications    levothyroxine 150 mcg tablet    Other Relevant Orders    TSH, 3rd generation with Free T4 reflex    Hypocalcemia    Relevant Orders    Comprehensive metabolic panel    Anxiety with depression    Relevant Medications    ALPRAZolam (XANAX) 0.25 mg tablet    escitalopram (LEXAPRO) 20 mg tablet    Spinal enthesopathy, sacral and sacrococcygeal region ([address])    Body mass index (BMI) 40.0-44.9, adult (HCC)   Other Visit Diagnoses     Primary osteoarthritis of both hips        Relevant Medications    meloxicam (MOBIC) 15 mg tablet    Ocampo's esophagus without dysplasia        Relevant Medications    omeprazole (PriLOSEC) 40 MG capsule    Financial difficulties        Welcome to Medicare preventive visit        Relevant Orders    POCT ECG (Completed)    Encounter for immunization        Relevant Orders    influenza vaccine, high-dose, PF 0.7 mL (FLUZONE HIGH-DOSE) (Completed)      Rheumatology manages [facility]. Falls related to dogs getting underfoot. Taking Gabapentin and got shoe lift. BMI Counseling: Body mass index is 41.32 kg/m². The BMI is above normal. Nutrition recommendations include encouraging healthy choices of fruits and vegetables, moderation in carbohydrate intake, increasing intake of lean protein and reducing intake of saturated and trans fat. Exercise recommendations include moderate physical activity 150 minutes/week. No pharmacotherapy was ordered. Rationale for BMI follow-up plan is due to patient being overweight or obese. Falls Plan of Care: balance, strength, and gait training instructions were provided.        Preventive health issues were discussed with patient, and age appropriate screening tests were ordered as noted in patient's After Visit Summary. Personalized health advice and appropriate referrals for health education or preventive services given if needed, as noted in patient's After Visit Summary.      History of Present Illness:     Patient presents for a Medicare Wellness Visit      Patient Care Team:  Kendra Hunter DO as PCP - General (Family Medicine)     Review of Systems:          Problem List:     Patient Active Problem List   Diagnosis   • Bacterial overgrowth syndrome   • Carpal tunnel syndrome   • Disorder of vocal cord   • Encounter for long-term (current) use of other medications   • Hypothyroidism   • Iron deficiency anemia   • Irritable bowel syndrome   • Lumbar spinal stenosis   • Mild intermittent asthma without complication   • Nontoxic uninodular goiter   • Osteoporosis   • Generalized osteoarthritis of multiple sites   • Primary osteoarthritis involving multiple joints   • Skin sensation disturbance   • Trochanteric bursitis of left hip   • Ulnar nerve abnormality   • Vitamin D deficiency   • Ocampo's esophagus with low grade dysplasia   • Status post bariatric surgery   • Postoperative hypothyroidism   • Osteoporosis, postmenopausal   • Hypocalcemia   • Calculus of kidney   • Anxiety with depression   • Family history of cardiovascular disease   • Spinal enthesopathy, sacral and sacrococcygeal region ([address])   • Body mass index (BMI) 40.0-44.9, adult [facility])      Past Medical and Surgical History:     Past Medical History:   Diagnosis Date   • Anemia    • Hypokalemia 07/14/2021   • Hypothyroidism    • Kidney stones      Past Surgical History:   Procedure Laterality Date   • CARPAL TUNNEL RELEASE      both hands   • CYSTOSCOPY W/ LASER LITHOTRIPSY     • HAND SURGERY Right 2018   • THYROIDECTOMY     • TONSILLECTOMY     • ULNAR NERVE REPAIR      both arms      Family History:     Family History   Problem Relation Age of Onset   • Hyperlipidemia Mother    • Diabetes Mother    • Heart attack Father         cause of death   • Thyroid cancer Brother       Social History:     Social History     Socioeconomic History   • Marital status: /Civil Union     Spouse name: None   • Number of children: None   • Years of education: None   • Highest education level: None   Occupational History   • None   Tobacco Use   • Smoking status: Never   • Smokeless tobacco: Never   Substance and Sexual Activity   • Alcohol use: No   • Drug use: No   • Sexual activity: None   Other Topics Concern   • None   Social History Narrative   • None     Social Determinants of Health     Financial Resource Strain: Low Risk  (10/11/2023)    Overall Financial Resource Strain (CARDIA)    • Difficulty of Paying Living Expenses: Not very hard   Food Insecurity: Not on file   Transportation Needs: No Transportation Needs (10/11/2023)    PRAPARE - Transportation    • Lack of Transportation (Medical): No    • Lack of Transportation (Non-Medical):  No   Physical Activity: Not on file   Stress: Not on file   Social Connections: Not on file   Intimate Partner Violence: Not on file   Housing Stability: Not on file      Medications and Allergies:     Current Outpatient Medications   Medication Sig Dispense Refill   • ALPRAZolam (XANAX) 0.25 mg tablet Take 1 tablet (0.25 mg total) by mouth 2 (two) times a day as needed for anxiety 20 tablet 0   • Calcium Citrate 250 MG TABS Takes two 500 mg tabs twice a day  0   • Cholecalciferol (Vitamin D3) 125 MCG (5000 UT) CAPS Take 20,000 Units by mouth 2 (two) times a day     • denosumab (PROLIA) 60 mg/mL Inject 60 mg under the skin     • diphenoxylate-atropine (LOMOTIL) 2.5-0.025 mg per tablet 4 tabs twice a day 720 tablet 1   • escitalopram (LEXAPRO) 20 mg tablet Take 1 tablet (20 mg total) by mouth daily 90 tablet 1   • fluticasone (FLONASE) 50 mcg/act nasal spray into each nostril as needed      • gabapentin (NEURONTIN) 300 mg capsule Take 1 capsule (300 mg total) by mouth 4 (four) times a day 360 capsule 1   • levothyroxine 150 mcg tablet TAKES 150MCG 4 TIMES A WEEK AND TAKE 1 1/2 (225MCG) THREE TIMES A WEEK 105 tablet 1   • loratadine (CLARITIN) 10 mg tablet Take 10 mg by mouth daily as needed     • meloxicam (MOBIC) 15 mg tablet Take 1 tablet (15 mg total) by mouth every morning 90 tablet 1   • Multiple Vitamins-Minerals (MULTIVITAMIN ADULT PO) Take by mouth 2 (two) times a day     • omeprazole (PriLOSEC) 40 MG capsule Take 1 capsule (40 mg total) by mouth 2 (two) times a day 180 capsule 1   • SODIUM FLUORIDE, DENTAL GEL, 1.1 % GEL Take 1 application by mouth       No current facility-administered medications for this visit.      Allergies   Allergen Reactions   • Contrast [Iodinated Contrast Media] Rash   • Ephedrine Other (See Comments)   • Gadolinium Derivatives      Other reaction(s): Hives and trouble breathing   • Lidocaine      Other reaction(s): heart palpatations, raised blood pressure   • Lidocaine-Epinephrine Other (See Comments)   • Wound Dressing Adhesive Rash      Immunizations:     Immunization History   Administered Date(s) Administered   • COVID-19 PFIZER VACCINE 0.3 ML IM 02/26/2021, 03/19/2021, 10/08/2021, 04/10/2022   • H1N1, All Formulations 11/18/2009   • INFLUENZA 10/15/2003, 10/21/2009, 12/02/2010, 01/19/2012, 12/07/2012, 11/09/2013, 10/31/2014, 10/08/2015, 10/12/2016, 11/17/2017   • Influenza, high dose seasonal 0.7 mL 10/11/2023   • Influenza, injectable, quadrivalent, preservative free 0.5 mL 09/10/2019   • Influenza, recombinant, quadrivalent,injectable, preservative free 08/26/2018, 11/11/2020, 10/29/2021   • Pneumococcal Conjugate Vaccine 20-valent (Pcv20), Polysace 03/09/2023   • Pneumococcal Polysaccharide PPV23 03/14/2020   • Td (adult), Unspecified 08/13/2002, 06/16/2010   • Tdap 03/14/2020   • Zoster 11/29/2017   • Zoster Vaccine Recombinant 09/28/2019, 12/06/2019      Health Maintenance:         Topic Date Due   • Breast Cancer Screening: Mammogram  02/08/2024   • Colorectal Cancer Screening  09/01/2029   • Hepatitis C Screening  Completed         Topic Date Due   • COVID-19 Vaccine (5 - Pfizer series) 06/05/2022      Medicare Screening Tests and Risk Assessments:     Caro Mark is here for her Welcome to Medicare visit. Health Risk Assessment:   Patient rates overall health as good. Patient feels that their physical health rating is slightly better. Patient is satisfied with their life. Eyesight was rated as slightly worse. Hearing was rated as same. Patient feels that their emotional and mental health rating is slightly better. Patients states they are never, rarely angry. Patient states they are never, rarely unusually tired/fatigued. Pain experienced in the last 7 days has been some. Patient's pain rating has been 4/10. Patient states that she has experienced no weight loss or gain in last 6 months. Depression Screening:   PHQ-9 Score: 2      Fall Risk Screening: In the past year, patient has experienced: history of falling in past year    Number of falls: 2 or more  Injured during fall?: No    Feels unsteady when standing or walking?: No    Worried about falling?: No      Urinary Incontinence Screening:   Patient has not leaked urine accidently in the last six months. Home Safety:  Patient has trouble with stairs inside or outside of their home. Patient has working smoke alarms and has working carbon monoxide detector. Home safety hazards include: none. Nutrition:   Current diet is Regular. Medications:   Patient is currently taking over-the-counter supplements. OTC medications include: see medication list. Patient is able to manage medications. Activities of Daily Living (ADLs)/Instrumental Activities of Daily Living (IADLs):   Walk and transfer into and out of bed and chair?: Yes  Dress and groom yourself?: Yes    Bathe or shower yourself?: Yes    Feed yourself?  Yes  Do your laundry/housekeeping?: Yes  Manage your money, pay your bills and track your expenses?: Yes  Make your own meals?: Yes    Do your own shopping?: Yes    Previous Hospitalizations:   Any hospitalizations or ED visits within the last 12 months?: Yes    How many hospitalizations have you had in the last year?: 1-2    Advance Care Planning:   Living will: Yes    Durable POA for healthcare: Yes    Advanced directive: Yes    Five wishes given: No    End of Life Decisions reviewed with patient: Yes      Cognitive Screening:   Provider or family/friend/caregiver concerned regarding cognition?: No    PREVENTIVE SCREENINGS      Cardiovascular Screening:    General: Screening Current      Diabetes Screening:     General: Screening Current      Colorectal Cancer Screening:     General: Screening Current      Breast Cancer Screening:     General: Screening Current      Cervical Cancer Screening:    General: Screening Not Indicated      Osteoporosis Screening:    General: Screening Not Indicated and History Osteoporosis      Lung Cancer Screening:     General: Screening Not Indicated      Hepatitis C Screening:    General: Screening Current    Screening, Brief Intervention, and Referral to Treatment (SBIRT)      Brief Intervention  Alcohol & drug use screenings were reviewed. No concerns regarding substance use disorder identified. Vision Screening    Right eye Left eye Both eyes   Without correction      With correction 20/30 20/30 20/20        Physical Exam:     /60 (BP Location: Right arm, Patient Position: Sitting, Cuff Size: Large)   Pulse 70   Temp 98.5 °F (36.9 °C) (Temporal)   Ht 5' 3.5" (1.613 m)   Wt 108 kg (237 lb)   SpO2 97%   BMI 41.32 kg/m²     Physical Exam  Vitals and nursing note reviewed. Constitutional:       General: She is not in acute distress. Appearance: She is well-developed. She is obese. HENT:      Head: Normocephalic.       Right Ear: Tympanic membrane normal.      Left Ear: Tympanic membrane normal.      Mouth/Throat:      Pharynx: No posterior oropharyngeal erythema. Eyes:      General: No scleral icterus. Neck:      Thyroid: No thyromegaly. Vascular: No carotid bruit. Cardiovascular:      Rate and Rhythm: Normal rate and regular rhythm. Heart sounds: Normal heart sounds. No murmur heard. Pulmonary:      Effort: Pulmonary effort is normal.      Breath sounds: Normal breath sounds. Abdominal:      General: Bowel sounds are normal.      Palpations: Abdomen is soft. Musculoskeletal:      Right lower leg: No edema. Left lower leg: No edema. Lymphadenopathy:      Cervical: No cervical adenopathy. Skin:     General: Skin is warm and dry. Neurological:      Mental Status: She is alert and oriented to person, place, and time.    Psychiatric:         Mood and Affect: Mood normal.          Colin Echeverria DO

## 2023-10-11 NOTE — PROGRESS NOTES
Name: Valeriano Jordan      : 1955      MRN: [id number]  Encounter Provider: Jimmy Oliver DO  Encounter Date: 10/11/2023   Encounter department: [address] [place] [address]   Patient is 59-year-old female seen for follow-up of chronic medical conditions. 1. Postoperative hypothyroidism  -     levothyroxine 150 mcg tablet; TAKES 150MCG 4 TIMES A WEEK AND TAKE 1 1/2 (225MCG) THREE TIMES A WEEK  -     TSH, 3rd generation with Free T4 reflex; Future; Expected date: 2023    2. Bacterial overgrowth syndrome  -     diphenoxylate-atropine (LOMOTIL) 2.5-0.025 mg per tablet; 4 tabs twice a day    3. Anxiety with depression  -     ALPRAZolam (XANAX) 0.25 mg tablet; Take 1 tablet (0.25 mg total) by mouth 2 (two) times a day as needed for anxiety  -     escitalopram (LEXAPRO) 20 mg tablet; Take 1 tablet (20 mg total) by mouth daily    4. Primary osteoarthritis of both hips  -     meloxicam (MOBIC) 15 mg tablet; Take 1 tablet (15 mg total) by mouth every morning    5. Generalized osteoarthritis of multiple sites  -     gabapentin (NEURONTIN) 300 mg capsule; Take 1 capsule (300 mg total) by mouth 4 (four) times a day    6. Ocampo's esophagus without dysplasia  -     omeprazole (PriLOSEC) 40 MG capsule; Take 1 capsule (40 mg total) by mouth 2 (two) times a day    7. Financial difficulties    8. Welcome to Medicare preventive visit  -     POCT ECG    9. Hypocalcemia  -     Comprehensive metabolic panel; Future; Expected date: 2023    10. Spinal enthesopathy, sacral and sacrococcygeal region ([address])    11. Body mass index (BMI) 40.0-44.9, adult ([address])    12. Encounter for immunization  -     influenza vaccine, high-dose, PF 0.7 mL (FLUZONE HIGH-DOSE)    Recheck in 6 months. Subjective      Patient is seen for follow-up of chronic medical conditions. She did have blood work prior to the visit. She is compliant with her medications.   She still is having some people in life which is affecting her emotions. Review of Systems   Constitutional:  Negative for chills and fever. HENT:  Negative for congestion and sore throat. Respiratory:  Negative for chest tightness. Cardiovascular:  Negative for chest pain and palpitations. Gastrointestinal:  Negative for abdominal pain, constipation, diarrhea and nausea. Genitourinary:  Negative for difficulty urinating. Skin: Negative. Neurological:  Negative for dizziness and headaches. Psychiatric/Behavioral:  Positive for decreased concentration. The patient is nervous/anxious. Current Outpatient Medications on File Prior to Visit   Medication Sig   • Calcium Citrate 250 MG TABS Takes two 500 mg tabs twice a day   • Cholecalciferol (Vitamin D3) 125 MCG (5000 UT) CAPS Take 20,000 Units by mouth 2 (two) times a day   • denosumab (PROLIA) 60 mg/mL Inject 60 mg under the skin   • fluticasone (FLONASE) 50 mcg/act nasal spray into each nostril as needed    • loratadine (CLARITIN) 10 mg tablet Take 10 mg by mouth daily as needed   • Multiple Vitamins-Minerals (MULTIVITAMIN ADULT PO) Take by mouth 2 (two) times a day   • SODIUM FLUORIDE, DENTAL GEL, 1.1 % GEL Take 1 application by mouth       Objective     /60 (BP Location: Right arm, Patient Position: Sitting, Cuff Size: Large)   Pulse 70   Temp 98.5 °F (36.9 °C) (Temporal)   Ht 5' 3.5" (1.613 m)   Wt 108 kg (237 lb)   SpO2 97%   BMI 41.32 kg/m²     Physical Exam  Vitals and nursing note reviewed. Constitutional:       General: She is not in acute distress. HENT:      Head: Normocephalic. Right Ear: Tympanic membrane normal.      Left Ear: Tympanic membrane normal.   Eyes:      General: No scleral icterus. Neck:      Thyroid: No thyromegaly. Cardiovascular:      Rate and Rhythm: Normal rate and regular rhythm. Heart sounds: Normal heart sounds. Pulmonary:      Effort: Pulmonary effort is normal.      Breath sounds: Normal breath sounds.    Musculoskeletal: Right lower leg: No edema. Left lower leg: No edema. Lymphadenopathy:      Cervical: No cervical adenopathy. Skin:     General: Skin is warm and dry. Neurological:      Mental Status: She is alert and oriented to person, place, and time.    Psychiatric:         Mood and Affect: Mood normal.       Ani Woodall DO

## 2023-10-12 PROCEDURE — G0403 EKG FOR INITIAL PREVENT EXAM: HCPCS | Performed by: FAMILY MEDICINE

## 2023-12-19 ENCOUNTER — APPOINTMENT (OUTPATIENT)
Dept: LAB | Facility: CLINIC | Age: 68
End: 2023-12-19
Payer: MEDICARE

## 2023-12-19 DIAGNOSIS — E83.51 HYPOCALCEMIA: ICD-10-CM

## 2023-12-19 DIAGNOSIS — E89.0 POSTOPERATIVE HYPOTHYROIDISM: ICD-10-CM

## 2023-12-19 LAB
ALBUMIN SERPL BCP-MCNC: 3.9 G/DL (ref 3.5–5)
ALP SERPL-CCNC: 61 U/L (ref 34–104)
ALT SERPL W P-5'-P-CCNC: 20 U/L (ref 7–52)
ANION GAP SERPL CALCULATED.3IONS-SCNC: 6 MMOL/L
AST SERPL W P-5'-P-CCNC: 20 U/L (ref 13–39)
BILIRUB SERPL-MCNC: 0.57 MG/DL (ref 0.2–1)
BUN SERPL-MCNC: 16 MG/DL (ref 5–25)
CALCIUM SERPL-MCNC: 9.5 MG/DL (ref 8.4–10.2)
CHLORIDE SERPL-SCNC: 105 MMOL/L (ref 96–108)
CO2 SERPL-SCNC: 29 MMOL/L (ref 21–32)
CREAT SERPL-MCNC: 0.67 MG/DL (ref 0.6–1.3)
GFR SERPL CREATININE-BSD FRML MDRD: 91 ML/MIN/1.73SQ M
GLUCOSE P FAST SERPL-MCNC: 84 MG/DL (ref 65–99)
POTASSIUM SERPL-SCNC: 3.6 MMOL/L (ref 3.5–5.3)
PROT SERPL-MCNC: 6.3 G/DL (ref 6.4–8.4)
SODIUM SERPL-SCNC: 140 MMOL/L (ref 135–147)
T4 FREE SERPL-MCNC: 1.15 NG/DL (ref 0.61–1.12)
TSH SERPL DL<=0.05 MIU/L-ACNC: 0.2 UIU/ML (ref 0.45–4.5)

## 2023-12-19 PROCEDURE — 84439 ASSAY OF FREE THYROXINE: CPT

## 2023-12-19 PROCEDURE — 36415 COLL VENOUS BLD VENIPUNCTURE: CPT

## 2023-12-19 PROCEDURE — 84443 ASSAY THYROID STIM HORMONE: CPT

## 2023-12-19 PROCEDURE — 80053 COMPREHEN METABOLIC PANEL: CPT

## 2024-01-18 ENCOUNTER — TELEPHONE (OUTPATIENT)
Age: 69
End: 2024-01-18

## 2024-01-18 DIAGNOSIS — M79.676 PAIN OF TOE, UNSPECIFIED LATERALITY: Primary | ICD-10-CM

## 2024-01-18 DIAGNOSIS — E89.0 POSTOPERATIVE HYPOTHYROIDISM: ICD-10-CM

## 2024-01-18 NOTE — TELEPHONE ENCOUNTER
Patient states she was woken out of sleep last night due to pain in left big toe. Patient states it can possibly be gout. Patient would like to know should she come in to get seen or if blood work can be placed.

## 2024-01-19 ENCOUNTER — APPOINTMENT (OUTPATIENT)
Dept: LAB | Facility: CLINIC | Age: 69
End: 2024-01-19
Payer: MEDICARE

## 2024-01-19 DIAGNOSIS — M79.676 PAIN OF TOE, UNSPECIFIED LATERALITY: ICD-10-CM

## 2024-01-19 LAB — URATE SERPL-MCNC: 4 MG/DL (ref 2–7.5)

## 2024-01-19 PROCEDURE — 84550 ASSAY OF BLOOD/URIC ACID: CPT

## 2024-01-19 PROCEDURE — 36415 COLL VENOUS BLD VENIPUNCTURE: CPT

## 2024-02-03 ENCOUNTER — DOCUMENTATION (OUTPATIENT)
Dept: FAMILY MEDICINE CLINIC | Facility: CLINIC | Age: 69
End: 2024-02-03

## 2024-02-03 ENCOUNTER — NURSE TRIAGE (OUTPATIENT)
Dept: OTHER | Facility: OTHER | Age: 69
End: 2024-02-03

## 2024-02-03 DIAGNOSIS — U07.1 COVID-19: Primary | ICD-10-CM

## 2024-02-03 RX ORDER — NIRMATRELVIR AND RITONAVIR 300-100 MG
3 KIT ORAL 2 TIMES DAILY
Qty: 30 TABLET | Refills: 0 | Status: SHIPPED | OUTPATIENT
Start: 2024-02-03 | End: 2024-02-08

## 2024-02-03 RX ORDER — NIRMATRELVIR AND RITONAVIR 300-100 MG
3 KIT ORAL 2 TIMES DAILY
Qty: 30 TABLET | Refills: 0 | Status: CANCELLED
Start: 2024-02-03 | End: 2024-02-08

## 2024-02-03 NOTE — TELEPHONE ENCOUNTER
"Regarding: Covid Positive, Fever, Congestion,  HX of Asthma, Paxlovid  ----- Message from Etelvina Beltre sent at 2/3/2024  9:47 AM EST -----  \" I tested Positive for Covid this morning, I have Congestion, Fever. I have a History of Asthma and was recommended by Dr. Coello to get Paxlovid if I am Positive for Covid.\"    "

## 2024-02-03 NOTE — TELEPHONE ENCOUNTER
"Reason for Disposition  • HIGH RISK for severe COVID complications (e.g., weak immune system, age > 64 years, obesity with BMI > 25, pregnant, chronic lung disease or other chronic medical condition)  (Exception: Already seen by PCP and no new or worsening symptoms.)    Answer Assessment - Initial Assessment Questions  1. COVID-19 DIAGNOSIS: \"Who made your COVID-19 diagnosis?\" \"Was it confirmed by a positive lab test or self-test?\" If not diagnosed by a doctor (or NP/PA), ask \"Are there lots of cases (community spread) where you live?\" Note: See public health department website, if unsure.      Positive home test    2. COVID-19 EXPOSURE: \"Was there any known exposure to COVID before the symptoms began?\" CDC Definition of close contact: within 6 feet (2 meters) for a total of 15 minutes or more over a 24-hour period.       Neighbor    3. ONSET: \"When did the COVID-19 symptoms start?\"       Yesterday    4. WORST SYMPTOM: \"What is your worst symptom?\" (e.g., cough, fever, shortness of breath, muscle aches)      Congestion, fever    5. COUGH: \"Do you have a cough?\" If Yes, ask: \"How bad is the cough?\"        Yes, mild cough    6. FEVER: \"Do you have a fever?\" If Yes, ask: \"What is your temperature, how was it measured, and when did it start?\"      Temp: 100 tympanic    7. RESPIRATORY STATUS: \"Describe your breathing?\" (e.g., shortness of breath, wheezing, unable to speak)       It's ok    8. BETTER-SAME-WORSE: \"Are you getting better, staying the same or getting worse compared to yesterday?\"  If getting worse, ask, \"In what way?\"      Worse, but not severe    9. HIGH RISK DISEASE: \"Do you have any chronic medical problems?\" (e.g., asthma, heart or lung disease, weak immune system, obesity, etc.)      Asthma, kidney disease, osteoarthritis; thyroid disease    10. VACCINE: \"Have you had the COVID-19 vaccine?\" If Yes, ask: \"Which one, how many shots, when did you get it?\"       Yes    11. BOOSTER: \"Have you received your " "COVID-19 booster?\" If Yes, ask: \"Which one and when did you get it?\"        UTD    12. OTHER SYMPTOMS: \"Do you have any other symptoms?\"  (e.g., chills, fatigue, headache, loss of smell or taste, muscle pain, sore throat)        Runny nose  13. O2 SATURATION MONITOR:  \"Do you use an oxygen saturation monitor (pulse oximeter) at home?\" If Yes, ask \"What is your reading (oxygen level) today?\" \"What is your usual oxygen saturation reading?\" (e.g., 95%)        Not sure where monitor is.    Protocols used: Coronavirus (COVID-19) Diagnosed or Suspected-ADULT-AH    "

## 2024-02-24 DIAGNOSIS — K22.70 BARRETT'S ESOPHAGUS WITHOUT DYSPLASIA: ICD-10-CM

## 2024-02-24 DIAGNOSIS — F41.8 ANXIETY WITH DEPRESSION: ICD-10-CM

## 2024-02-24 DIAGNOSIS — E89.0 POSTOPERATIVE HYPOTHYROIDISM: ICD-10-CM

## 2024-02-24 DIAGNOSIS — K63.89 BACTERIAL OVERGROWTH SYNDROME: ICD-10-CM

## 2024-02-25 RX ORDER — OMEPRAZOLE 40 MG/1
40 CAPSULE, DELAYED RELEASE ORAL 2 TIMES DAILY
Qty: 180 CAPSULE | Refills: 1 | Status: SHIPPED | OUTPATIENT
Start: 2024-02-25

## 2024-02-25 RX ORDER — ESCITALOPRAM OXALATE 20 MG/1
20 TABLET ORAL DAILY
Qty: 90 TABLET | Refills: 1 | Status: SHIPPED | OUTPATIENT
Start: 2024-02-25

## 2024-02-25 RX ORDER — LEVOTHYROXINE SODIUM 0.15 MG/1
TABLET ORAL
Qty: 105 TABLET | Refills: 1 | Status: SHIPPED | OUTPATIENT
Start: 2024-02-25

## 2024-02-26 RX ORDER — DIPHENOXYLATE HYDROCHLORIDE AND ATROPINE SULFATE 2.5; .025 MG/1; MG/1
TABLET ORAL
Qty: 720 TABLET | Refills: 0 | Status: SHIPPED | OUTPATIENT
Start: 2024-02-26

## 2024-03-13 ENCOUNTER — APPOINTMENT (OUTPATIENT)
Dept: LAB | Facility: CLINIC | Age: 69
End: 2024-03-13
Payer: MEDICARE

## 2024-03-13 DIAGNOSIS — E89.0 POSTOPERATIVE HYPOTHYROIDISM: ICD-10-CM

## 2024-03-13 DIAGNOSIS — M81.0 AGE-RELATED OSTEOPOROSIS WITHOUT CURRENT PATHOLOGICAL FRACTURE: ICD-10-CM

## 2024-03-13 LAB
ANION GAP SERPL CALCULATED.3IONS-SCNC: 11 MMOL/L (ref 4–13)
BUN SERPL-MCNC: 18 MG/DL (ref 5–25)
CALCIUM SERPL-MCNC: 8.2 MG/DL (ref 8.4–10.2)
CHLORIDE SERPL-SCNC: 108 MMOL/L (ref 96–108)
CO2 SERPL-SCNC: 24 MMOL/L (ref 21–32)
CREAT SERPL-MCNC: 0.8 MG/DL (ref 0.6–1.3)
GFR SERPL CREATININE-BSD FRML MDRD: 76 ML/MIN/1.73SQ M
GLUCOSE P FAST SERPL-MCNC: 116 MG/DL (ref 65–99)
POTASSIUM SERPL-SCNC: 3.4 MMOL/L (ref 3.5–5.3)
SODIUM SERPL-SCNC: 143 MMOL/L (ref 135–147)
T4 FREE SERPL-MCNC: 1.12 NG/DL (ref 0.61–1.12)
TSH SERPL DL<=0.05 MIU/L-ACNC: 0.36 UIU/ML (ref 0.45–4.5)

## 2024-03-13 PROCEDURE — 84443 ASSAY THYROID STIM HORMONE: CPT

## 2024-03-13 PROCEDURE — 36415 COLL VENOUS BLD VENIPUNCTURE: CPT

## 2024-03-13 PROCEDURE — 84439 ASSAY OF FREE THYROXINE: CPT

## 2024-03-13 PROCEDURE — 80048 BASIC METABOLIC PNL TOTAL CA: CPT

## 2024-04-04 ENCOUNTER — RA CDI HCC (OUTPATIENT)
Dept: OTHER | Facility: HOSPITAL | Age: 69
End: 2024-04-04

## 2024-04-04 PROBLEM — E66.01 MORBID OBESITY (HCC): Status: ACTIVE | Noted: 2024-04-04

## 2024-04-04 NOTE — PROGRESS NOTES
HCC coding opportunities          Chart Reviewed number of suggestions sent to Provider: 1     Patients Insurance     Medicare Insurance: Medicare        E66.01

## 2024-04-11 ENCOUNTER — OFFICE VISIT (OUTPATIENT)
Dept: FAMILY MEDICINE CLINIC | Facility: CLINIC | Age: 69
End: 2024-04-11
Payer: MEDICARE

## 2024-04-11 VITALS
HEART RATE: 66 BPM | BODY MASS INDEX: 41.38 KG/M2 | OXYGEN SATURATION: 97 % | SYSTOLIC BLOOD PRESSURE: 108 MMHG | HEIGHT: 64 IN | TEMPERATURE: 97.7 F | DIASTOLIC BLOOD PRESSURE: 68 MMHG | WEIGHT: 242.4 LBS

## 2024-04-11 DIAGNOSIS — E89.0 POSTOPERATIVE HYPOTHYROIDISM: Primary | ICD-10-CM

## 2024-04-11 DIAGNOSIS — E87.6 HYPOKALEMIA: ICD-10-CM

## 2024-04-11 DIAGNOSIS — E83.51 HYPOCALCEMIA: ICD-10-CM

## 2024-04-11 DIAGNOSIS — E55.9 VITAMIN D DEFICIENCY: ICD-10-CM

## 2024-04-11 PROCEDURE — 99213 OFFICE O/P EST LOW 20 MIN: CPT | Performed by: FAMILY MEDICINE

## 2024-04-11 PROCEDURE — G2211 COMPLEX E/M VISIT ADD ON: HCPCS | Performed by: FAMILY MEDICINE

## 2024-04-11 RX ORDER — LEVOTHYROXINE SODIUM 0.12 MG/1
TABLET ORAL
Qty: 90 TABLET | Refills: 1 | Status: SHIPPED | OUTPATIENT
Start: 2024-04-11

## 2024-04-11 RX ORDER — LEVOTHYROXINE SODIUM 0.15 MG/1
TABLET ORAL
Start: 2024-04-11

## 2024-04-11 RX ORDER — PSEUDOEPHEDRINE HCL 30 MG
TABLET ORAL
Start: 2024-04-11

## 2024-04-11 RX ORDER — POTASSIUM CHLORIDE 20 MEQ/1
20 TABLET, EXTENDED RELEASE ORAL DAILY
Qty: 90 TABLET | Refills: 1 | Status: SHIPPED | OUTPATIENT
Start: 2024-04-11

## 2024-04-11 NOTE — PROGRESS NOTES
Name: Estela Mitchell      : 1955      MRN: [id number]  Encounter Provider: Alexus Coello DO  Encounter Date: 2024   Encounter department: [facility]    Assessment & Plan   Patient is seen for follow up of chronic medical conditions.  1. Postoperative hypothyroidism  -     levothyroxine 150 mcg tablet; TAKES 150MCG FFIVE DAYS A WEEK  -     levothyroxine (Synthroid) 125 mcg tablet; TAKE ONE TABLET TWO DAYS A WEEK  -     TSH, 3rd generation with Free T4 reflex; Future; Expected date: 06/10/2024  -     CBC and differential; Future; Expected date: 06/10/2024  2. Hypocalcemia  -     Calcium Citrate 250 MG TABS; Takes two 500 mg tabs three times a day  -     CBC and differential; Future; Expected date: 06/10/2024  -     Comprehensive metabolic panel; Future; Expected date: 06/10/2024  -     Vitamin D 25 hydroxy; Future; Expected date: 06/10/2024  3. Vitamin D deficiency  -     Vitamin D 25 hydroxy; Future; Expected date: 06/10/2024  4. Hypokalemia  -     potassium chloride (Klor-Con M20) 20 mEq tablet; Take 1 tablet (20 mEq total) by mouth daily  -     CBC and differential; Future; Expected date: 06/10/2024  -     Comprehensive metabolic panel; Future; Expected date: 06/10/2024      Depression Screening and Follow-up Plan: Patient was screened for depression during today's encounter. They screened negative with a PHQ-9 score of 0.    Falls Plan of Care: balance, strength, and gait training instructions were provided.     Will probably be getting  next month.  Her mother passed away.  Due for wellness visit in October.  Subjective      Chief Complaint   Patient presents with   • Follow-up     Review labs       Patient is seen for follow up of chronic medical conditions.  She has been through a lot this year.  Had COVID in February - was treated with Paxlovid.      Review of Systems   Constitutional:  Negative for chills and fever.   HENT:  Negative for congestion and sore throat.   "  Respiratory:  Negative for chest tightness.    Cardiovascular:  Negative for chest pain and palpitations.   Gastrointestinal:  Negative for abdominal pain, constipation, diarrhea and nausea.   Genitourinary:  Negative for difficulty urinating.   Skin: Negative.    Neurological:  Negative for dizziness and headaches.   Psychiatric/Behavioral: Negative.         Current Outpatient Medications on File Prior to Visit   Medication Sig   • ALPRAZolam (XANAX) 0.25 mg tablet Take 1 tablet (0.25 mg total) by mouth 2 (two) times a day as needed for anxiety   • Cholecalciferol (Vitamin D3) 125 MCG (5000 UT) CAPS Take 20,000 Units by mouth 2 (two) times a day   • denosumab (PROLIA) 60 mg/mL Inject 60 mg under the skin   • escitalopram (LEXAPRO) 20 mg tablet TAKE 1 TABLET BY MOUTH EVERY DAY   • fluticasone (FLONASE) 50 mcg/act nasal spray into each nostril as needed    • gabapentin (NEURONTIN) 300 mg capsule Take 1 capsule (300 mg total) by mouth 4 (four) times a day   • loratadine (CLARITIN) 10 mg tablet Take 10 mg by mouth daily as needed   • meloxicam (MOBIC) 15 mg tablet Take 1 tablet (15 mg total) by mouth every morning   • Multiple Vitamins-Minerals (MULTIVITAMIN ADULT PO) Take by mouth 2 (two) times a day   • omeprazole (PriLOSEC) 40 MG capsule TAKE 1 CAPSULE BY MOUTH TWICE A DAY   • SODIUM FLUORIDE, DENTAL GEL, 1.1 % GEL Take 1 application by mouth       Objective     /68 (BP Location: Left arm, Patient Position: Sitting, Cuff Size: Large)   Pulse 66   Temp 97.7 °F (36.5 °C) (Temporal)   Ht 5' 3.5\" (1.613 m)   Wt 110 kg (242 lb 6.4 oz)   SpO2 97%   BMI 42.27 kg/m²     Physical Exam  Vitals and nursing note reviewed.   Constitutional:       General: She is not in acute distress.     Appearance: She is obese.   HENT:      Head: Normocephalic.   Neck:      Thyroid: No thyromegaly.   Cardiovascular:      Rate and Rhythm: Normal rate and regular rhythm.      Heart sounds: Normal heart sounds.   Pulmonary:      " Effort: Pulmonary effort is normal.      Breath sounds: Normal breath sounds.   Musculoskeletal:      Right lower leg: No edema.      Left lower leg: No edema.   Lymphadenopathy:      Cervical: No cervical adenopathy.   Skin:     General: Skin is warm and dry.   Neurological:      Mental Status: She is alert and oriented to person, place, and time.   Psychiatric:         Mood and Affect: Mood normal.       Alexus Coello, DO

## 2024-05-11 DIAGNOSIS — K63.89 BACTERIAL OVERGROWTH SYNDROME: ICD-10-CM

## 2024-05-14 RX ORDER — DIPHENOXYLATE HYDROCHLORIDE AND ATROPINE SULFATE 2.5; .025 MG/1; MG/1
TABLET ORAL
Qty: 720 TABLET | Refills: 0 | Status: SHIPPED | OUTPATIENT
Start: 2024-05-14

## 2024-06-14 ENCOUNTER — TELEPHONE (OUTPATIENT)
Dept: PSYCHIATRY | Facility: CLINIC | Age: 69
End: 2024-06-14

## 2024-06-14 NOTE — TELEPHONE ENCOUNTER
Contacted patient off of [facility]  wait list to verify needs of services in attempts to update list with patient preferences. spoke with patient whom stated she is interested in services. IC verified , address, and insurance. IC verified wait list office locations, pt agreed to keep [facility] and [place] offices due to PFA against  and wanting to keep her location protected.  IC will update wait list status and send resource guide to pt.    Final call attempt    Email: [email or web address]

## 2024-06-20 ENCOUNTER — APPOINTMENT (OUTPATIENT)
Dept: LAB | Facility: CLINIC | Age: 69
End: 2024-06-20
Payer: MEDICARE

## 2024-06-20 DIAGNOSIS — E55.9 VITAMIN D DEFICIENCY: ICD-10-CM

## 2024-06-20 DIAGNOSIS — E87.6 HYPOKALEMIA: ICD-10-CM

## 2024-06-20 DIAGNOSIS — E83.51 HYPOCALCEMIA: ICD-10-CM

## 2024-06-20 DIAGNOSIS — E89.0 POSTOPERATIVE HYPOTHYROIDISM: ICD-10-CM

## 2024-06-20 LAB
25(OH)D3 SERPL-MCNC: 93.5 NG/ML (ref 30–100)
ALBUMIN SERPL BCG-MCNC: 3.7 G/DL (ref 3.5–5)
ALP SERPL-CCNC: 62 U/L (ref 34–104)
ALT SERPL W P-5'-P-CCNC: 20 U/L (ref 7–52)
ANION GAP SERPL CALCULATED.3IONS-SCNC: 10 MMOL/L (ref 4–13)
AST SERPL W P-5'-P-CCNC: 18 U/L (ref 13–39)
BASOPHILS # BLD AUTO: 0.05 THOUSANDS/ÂΜL (ref 0–0.1)
BASOPHILS NFR BLD AUTO: 1 % (ref 0–1)
BILIRUB SERPL-MCNC: 0.44 MG/DL (ref 0.2–1)
BUN SERPL-MCNC: 20 MG/DL (ref 5–25)
CALCIUM SERPL-MCNC: 8.2 MG/DL (ref 8.4–10.2)
CHLORIDE SERPL-SCNC: 107 MMOL/L (ref 96–108)
CO2 SERPL-SCNC: 24 MMOL/L (ref 21–32)
CREAT SERPL-MCNC: 0.68 MG/DL (ref 0.6–1.3)
EOSINOPHIL # BLD AUTO: 0.43 THOUSAND/ÂΜL (ref 0–0.61)
EOSINOPHIL NFR BLD AUTO: 6 % (ref 0–6)
ERYTHROCYTE [DISTWIDTH] IN BLOOD BY AUTOMATED COUNT: 14.1 % (ref 11.6–15.1)
GFR SERPL CREATININE-BSD FRML MDRD: 90 ML/MIN/1.73SQ M
GLUCOSE P FAST SERPL-MCNC: 81 MG/DL (ref 65–99)
HCT VFR BLD AUTO: 37.8 % (ref 34.8–46.1)
HGB BLD-MCNC: 12.2 G/DL (ref 11.5–15.4)
IMM GRANULOCYTES # BLD AUTO: 0.02 THOUSAND/UL (ref 0–0.2)
IMM GRANULOCYTES NFR BLD AUTO: 0 % (ref 0–2)
LYMPHOCYTES # BLD AUTO: 1.85 THOUSANDS/ÂΜL (ref 0.6–4.47)
LYMPHOCYTES NFR BLD AUTO: 28 % (ref 14–44)
MCH RBC QN AUTO: 29.9 PG (ref 26.8–34.3)
MCHC RBC AUTO-ENTMCNC: 32.3 G/DL (ref 31.4–37.4)
MCV RBC AUTO: 93 FL (ref 82–98)
MONOCYTES # BLD AUTO: 0.61 THOUSAND/ÂΜL (ref 0.17–1.22)
MONOCYTES NFR BLD AUTO: 9 % (ref 4–12)
NEUTROPHILS # BLD AUTO: 3.77 THOUSANDS/ÂΜL (ref 1.85–7.62)
NEUTS SEG NFR BLD AUTO: 56 % (ref 43–75)
NRBC BLD AUTO-RTO: 0 /100 WBCS
PLATELET # BLD AUTO: 237 THOUSANDS/UL (ref 149–390)
PMV BLD AUTO: 11.4 FL (ref 8.9–12.7)
POTASSIUM SERPL-SCNC: 3.4 MMOL/L (ref 3.5–5.3)
PROT SERPL-MCNC: 6.1 G/DL (ref 6.4–8.4)
RBC # BLD AUTO: 4.08 MILLION/UL (ref 3.81–5.12)
SODIUM SERPL-SCNC: 141 MMOL/L (ref 135–147)
TSH SERPL DL<=0.05 MIU/L-ACNC: 2.63 UIU/ML (ref 0.45–4.5)
WBC # BLD AUTO: 6.73 THOUSAND/UL (ref 4.31–10.16)

## 2024-06-20 PROCEDURE — 84443 ASSAY THYROID STIM HORMONE: CPT

## 2024-06-20 PROCEDURE — 80053 COMPREHEN METABOLIC PANEL: CPT

## 2024-06-20 PROCEDURE — 85025 COMPLETE CBC W/AUTO DIFF WBC: CPT

## 2024-06-20 PROCEDURE — 82306 VITAMIN D 25 HYDROXY: CPT

## 2024-06-20 PROCEDURE — 36415 COLL VENOUS BLD VENIPUNCTURE: CPT

## 2024-06-26 ENCOUNTER — TELEPHONE (OUTPATIENT)
Age: 69
End: 2024-06-26

## 2024-06-26 NOTE — TELEPHONE ENCOUNTER
Requesting lab results done 6/20/24.  Patient would like to know if she should continue on both Rx: Levothyroxine 150mg and 125mg, and Potassium?.    Pharmacy [facility] [address]    Please review and advice  Thank you

## 2024-06-27 DIAGNOSIS — E89.0 POSTOPERATIVE HYPOTHYROIDISM: Primary | ICD-10-CM

## 2024-06-27 DIAGNOSIS — E89.0 POSTOPERATIVE HYPOTHYROIDISM: ICD-10-CM

## 2024-06-27 DIAGNOSIS — E87.6 HYPOKALEMIA: ICD-10-CM

## 2024-06-27 RX ORDER — POTASSIUM CHLORIDE 20 MEQ/1
20 TABLET, EXTENDED RELEASE ORAL 2 TIMES DAILY
Qty: 180 TABLET | Refills: 1 | Status: SHIPPED | OUTPATIENT
Start: 2024-06-27

## 2024-06-27 RX ORDER — LEVOTHYROXINE SODIUM 0.15 MG/1
TABLET ORAL
Qty: 105 TABLET | Refills: 1 | Status: SHIPPED | OUTPATIENT
Start: 2024-06-27

## 2024-08-18 DIAGNOSIS — F41.8 ANXIETY WITH DEPRESSION: ICD-10-CM

## 2024-08-18 DIAGNOSIS — M16.0 PRIMARY OSTEOARTHRITIS OF BOTH HIPS: ICD-10-CM

## 2024-08-18 DIAGNOSIS — K63.89 BACTERIAL OVERGROWTH SYNDROME: ICD-10-CM

## 2024-08-18 RX ORDER — ESCITALOPRAM OXALATE 20 MG/1
20 TABLET ORAL DAILY
Qty: 90 TABLET | Refills: 1 | Status: SHIPPED | OUTPATIENT
Start: 2024-08-18

## 2024-08-18 RX ORDER — MELOXICAM 15 MG/1
15 TABLET ORAL EVERY MORNING
Qty: 90 TABLET | Refills: 1 | Status: SHIPPED | OUTPATIENT
Start: 2024-08-18

## 2024-08-19 DIAGNOSIS — K22.70 BARRETT'S ESOPHAGUS WITHOUT DYSPLASIA: ICD-10-CM

## 2024-08-19 RX ORDER — DIPHENOXYLATE HCL/ATROPINE 2.5-.025MG
TABLET ORAL
Qty: 720 TABLET | Refills: 0 | Status: SHIPPED | OUTPATIENT
Start: 2024-08-19

## 2024-08-19 RX ORDER — OMEPRAZOLE 40 MG/1
40 CAPSULE, DELAYED RELEASE ORAL 2 TIMES DAILY
Qty: 180 CAPSULE | Refills: 1 | Status: SHIPPED | OUTPATIENT
Start: 2024-08-19

## 2024-09-28 DIAGNOSIS — M15.9 GENERALIZED OSTEOARTHRITIS OF MULTIPLE SITES: ICD-10-CM

## 2024-09-29 RX ORDER — GABAPENTIN 300 MG/1
300 CAPSULE ORAL 4 TIMES DAILY
Qty: 360 CAPSULE | Refills: 1 | Status: SHIPPED | OUTPATIENT
Start: 2024-09-29

## 2024-10-08 ENCOUNTER — RA CDI HCC (OUTPATIENT)
Dept: OTHER | Facility: HOSPITAL | Age: 69
End: 2024-10-08

## 2024-10-15 ENCOUNTER — OFFICE VISIT (OUTPATIENT)
Dept: FAMILY MEDICINE CLINIC | Facility: CLINIC | Age: 69
End: 2024-10-15
Payer: MEDICARE

## 2024-10-15 VITALS
SYSTOLIC BLOOD PRESSURE: 120 MMHG | DIASTOLIC BLOOD PRESSURE: 76 MMHG | TEMPERATURE: 97.6 F | BODY MASS INDEX: 42.47 KG/M2 | HEART RATE: 79 BPM | HEIGHT: 64 IN | WEIGHT: 248.8 LBS | OXYGEN SATURATION: 97 %

## 2024-10-15 DIAGNOSIS — E89.0 POSTOPERATIVE HYPOTHYROIDISM: ICD-10-CM

## 2024-10-15 DIAGNOSIS — M81.0 AGE-RELATED OSTEOPOROSIS WITHOUT CURRENT PATHOLOGICAL FRACTURE: ICD-10-CM

## 2024-10-15 DIAGNOSIS — M46.08 SPINAL ENTHESOPATHY, SACRAL AND SACROCOCCYGEAL REGION (HCC): ICD-10-CM

## 2024-10-15 DIAGNOSIS — Z00.00 MEDICARE ANNUAL WELLNESS VISIT, SUBSEQUENT: Primary | ICD-10-CM

## 2024-10-15 PROCEDURE — G0438 PPPS, INITIAL VISIT: HCPCS | Performed by: FAMILY MEDICINE

## 2024-10-15 PROCEDURE — 99213 OFFICE O/P EST LOW 20 MIN: CPT | Performed by: FAMILY MEDICINE

## 2024-10-15 NOTE — PROGRESS NOTES
Ambulatory Visit  Name: Estela Mitchell      : 1955      MRN: [id number]  Encounter Provider: Alexus Coello DO  Encounter Date: 10/15/2024   Encounter department: [facility]    Assessment & Plan  Medicare annual wellness visit, subsequent  Questions reviewed.       Postoperative hypothyroidism    Orders:    TSH, 3rd generation with Free T4 reflex; Future    Age-related osteoporosis without current pathological fracture         Body mass index (BMI) 40.0-44.9, adult (HCC)         Spinal enthesopathy, sacral and sacrococcygeal region (HCC)       Follow up in six months.    Depression Screening and Follow-up Plan: Patient was screened for depression during today's encounter. They screened negative with a PHQ-9 score of 0.    Falls Plan of Care: balance, strength, and gait training instructions were provided.       Preventive health issues were discussed with patient, and age appropriate screening tests were ordered as noted in patient's After Visit Summary. Personalized health advice and appropriate referrals for health education or preventive services given if needed, as noted in patient's After Visit Summary.    History of Present Illness     HPI   Patient Care Team:  Alexus Coello DO as PCP - General (Family Medicine)    Review of Systems  Medical History Reviewed by provider this encounter:  Tobacco  Allergies  Meds  Problems  Med Hx  Surg Hx  Fam Hx       Annual Wellness Visit Questionnaire   Estela is here for her Subsequent Wellness visit. Last Medicare Wellness visit information reviewed, patient interviewed and updates made to the record today.      Health Risk Assessment:   Patient rates overall health as very good. Patient feels that their physical health rating is same. Patient is very satisfied with their life. Eyesight was rated as same. Hearing was rated as same. Patient feels that their emotional and mental health rating is same. Patients states they are never, rarely  angry. Patient states they are sometimes unusually tired/fatigued. Pain experienced in the last 7 days has been some. Patient's pain rating has been 3/10. Patient states that she has experienced no weight loss or gain in last 6 months.     Depression Screening:   PHQ-9 Score: 0      Fall Risk Screening:   In the past year, patient has experienced: history of falling in past year    Number of falls: 2 or more  Injured during fall?: No    Feels unsteady when standing or walking?: No    Worried about falling?: No      Urinary Incontinence Screening:   Patient has not leaked urine accidently in the last six months.     Home Safety:  Patient has trouble with stairs inside or outside of their home. Patient has working smoke alarms and has working carbon monoxide detector. Home safety hazards include: none.     Nutrition:   Current diet is Regular.     Medications:   Patient is currently taking over-the-counter supplements. OTC medications include: see medication list. Patient is able to manage medications.     Activities of Daily Living (ADLs)/Instrumental Activities of Daily Living (IADLs):   Walk and transfer into and out of bed and chair?: Yes  Dress and groom yourself?: Yes    Bathe or shower yourself?: Yes    Feed yourself? Yes  Do your laundry/housekeeping?: Yes  Manage your money, pay your bills and track your expenses?: Yes  Make your own meals?: Yes    Do your own shopping?: Yes    Previous Hospitalizations:   Any hospitalizations or ED visits within the last 12 months?: No      Advance Care Planning:   Living will: Yes    Durable POA for healthcare: Yes    Advanced directive: Yes    ACP document given: Yes    End of Life Decisions reviewed with patient: Yes      Cognitive Screening:   Provider or family/friend/caregiver concerned regarding cognition?: No    PREVENTIVE SCREENINGS      Cardiovascular Screening:    General: Screening Current      Diabetes Screening:     General: Screening Current      Colorectal  Cancer Screening:     General: Screening Current      Breast Cancer Screening:     General: Screening Current      Cervical Cancer Screening:    General: Screening Not Indicated      Osteoporosis Screening:    General: Screening Not Indicated and History Osteoporosis      Abdominal Aortic Aneurysm (AAA) Screening:        General: Screening Not Indicated      Lung Cancer Screening:     General: Screening Not Indicated      Hepatitis C Screening:    General: Screening Current    Screening, Brief Intervention, and Referral to Treatment (SBIRT)    Screening  Typical number of drinks in a day: 0  Typical number of drinks in a week: 0  Interpretation: Low risk drinking behavior.    Single Item Drug Screening:  How often have you used an illegal drug (including marijuana) or a prescription medication for non-medical reasons in the past year? never    Single Item Drug Screen Score: 0  Interpretation: Negative screen for possible drug use disorder    Brief Intervention  Alcohol & drug use screenings were reviewed. No concerns regarding substance use disorder identified.     Social Drivers of Health     Financial Resource Strain: Low Risk  (10/11/2023)    Overall Financial Resource Strain (CARDIA)     Difficulty of Paying Living Expenses: Not very hard   Food Insecurity: No Food Insecurity (10/15/2024)    Nursing - Inadequate Food Risk Classification     Worried About Running Out of Food in the Last Year: Never true     Ran Out of Food in the Last Year: Never true   Transportation Needs: No Transportation Needs (10/15/2024)    PRAPARE - Transportation     Lack of Transportation (Medical): No     Lack of Transportation (Non-Medical): No   Housing Stability: Low Risk  (10/15/2024)    Housing Stability Vital Sign     Unable to Pay for Housing in the Last Year: No     Number of Times Moved in the Last Year: 0     Homeless in the Last Year: No   Utilities: Not At Risk (10/15/2024)    [facility] Utilities     Threatened with loss of  "utilities: No     No results found.    Objective     /76 (BP Location: Left arm, Patient Position: Sitting, Cuff Size: Adult)   Pulse 79   Temp 97.6 °F (36.4 °C)   Ht 5' 3.5\" (1.613 m)   Wt 113 kg (248 lb 12.8 oz)   SpO2 97%   BMI 43.38 kg/m²     Physical Exam  Vitals and nursing note reviewed.   Constitutional:       General: She is not in acute distress.     Appearance: She is well-developed.   HENT:      Head: Normocephalic.      Right Ear: Tympanic membrane normal.      Left Ear: Tympanic membrane normal.      Mouth/Throat:      Pharynx: No posterior oropharyngeal erythema.   Eyes:      General: No scleral icterus.  Neck:      Thyroid: No thyromegaly.      Vascular: No carotid bruit.   Cardiovascular:      Rate and Rhythm: Normal rate and regular rhythm.      Heart sounds: Normal heart sounds. No murmur heard.  Pulmonary:      Effort: Pulmonary effort is normal.      Breath sounds: Normal breath sounds.   Abdominal:      General: Bowel sounds are normal.      Palpations: Abdomen is soft.   Musculoskeletal:      Right lower leg: No edema.      Left lower leg: No edema.   Lymphadenopathy:      Cervical: No cervical adenopathy.   Skin:     General: Skin is warm and dry.   Neurological:      Mental Status: She is alert and oriented to person, place, and time.   Psychiatric:         Mood and Affect: Mood normal.         "

## 2024-10-15 NOTE — PATIENT INSTRUCTIONS
Medicare Preventive Visit Patient Instructions  Thank you for completing your Welcome to Medicare Visit or Medicare Annual Wellness Visit today. Your next wellness visit will be due in one year (10/16/2025).  The screening/preventive services that you may require over the next 5-10 years are detailed below. Some tests may not apply to you based off risk factors and/or age. Screening tests ordered at today's visit but not completed yet may show as past due. Also, please note that scanned in results may not display below.  Preventive Screenings:  Service Recommendations Previous Testing/Comments   Colorectal Cancer Screening  * Colonoscopy    * Fecal Occult Blood Test (FOBT)/Fecal Immunochemical Test (FIT)  * Fecal DNA/Cologuard Test  * Flexible Sigmoidoscopy Age: 45-75 years old   Colonoscopy: every 10 years (may be performed more frequently if at higher risk)  OR  FOBT/FIT: every 1 year  OR  Cologuard: every 3 years  OR  Sigmoidoscopy: every 5 years  Screening may be recommended earlier than age 45 if at higher risk for colorectal cancer. Also, an individualized decision between you and your healthcare provider will decide whether screening between the ages of 76-85 would be appropriate. Colonoscopy: 09/01/2019  FOBT/FIT: Not on file  Cologuard: Not on file  Sigmoidoscopy: Not on file    Screening Current     Breast Cancer Screening Age: 40+ years old  Frequency: every 1-2 years  Not required if history of left and right mastectomy Mammogram: 03/20/2024    Screening Current   Cervical Cancer Screening Between the ages of 21-29, pap smear recommended once every 3 years.   Between the ages of 30-65, can perform pap smear with HPV co-testing every 5 years.   Recommendations may differ for women with a history of total hysterectomy, cervical cancer, or abnormal pap smears in past. Pap Smear: Not on file    Screening Not Indicated   Hepatitis C Screening Once for adults born between 1945 and 1965  More frequently in  patients at high risk for Hepatitis C Hep C Antibody: 04/04/2017    Screening Current   Diabetes Screening 1-2 times per year if you're at risk for diabetes or have pre-diabetes Fasting glucose: 81 mg/dL (6/20/2024)  A1C: No results in last 5 years (No results in last 5 years)  Screening Current   Cholesterol Screening Once every 5 years if you don't have a lipid disorder. May order more often based on risk factors. Lipid panel: 09/29/2023    Screening Current     Other Preventive Screenings Covered by Medicare:  Abdominal Aortic Aneurysm (AAA) Screening: covered once if your at risk. You're considered to be at risk if you have a family history of AAA.  Lung Cancer Screening: covers low dose CT scan once per year if you meet all of the following conditions: (1) Age 55-77; (2) No signs or symptoms of lung cancer; (3) Current smoker or have quit smoking within the last 15 years; (4) You have a tobacco smoking history of at least 20 pack years (packs per day multiplied by number of years you smoked); (5) You get a written order from a healthcare provider.  Glaucoma Screening: covered annually if you're considered high risk: (1) You have diabetes OR (2) Family history of glaucoma OR (3)  aged 50 and older OR (4)  American aged 65 and older  Osteoporosis Screening: covered every 2 years if you meet one of the following conditions: (1) You're estrogen deficient and at risk for osteoporosis based off medical history and other findings; (2) Have a vertebral abnormality; (3) On glucocorticoid therapy for more than 3 months; (4) Have primary hyperparathyroidism; (5) On osteoporosis medications and need to assess response to drug therapy.   Last bone density test (DXA Scan): 01/02/2022.  HIV Screening: covered annually if you're between the age of 15-65. Also covered annually if you are younger than 15 and older than 65 with risk factors for HIV infection. For pregnant patients, it is covered up to 3  times per pregnancy.    Immunizations:  Immunization Recommendations   Influenza Vaccine Annual influenza vaccination during flu season is recommended for all persons aged >= 6 months who do not have contraindications   Pneumococcal Vaccine   * Pneumococcal conjugate vaccine = PCV13 (Prevnar 13), PCV15 (Vaxneuvance), PCV20 (Prevnar 20)  * Pneumococcal polysaccharide vaccine = PPSV23 (Pneumovax) Adults 19-65 yo with certain risk factors or if 65+ yo  If never received any pneumonia vaccine: recommend Prevnar 20 (PCV20)  Give PCV20 if previously received 1 dose of PCV13 or PPSV23   Hepatitis B Vaccine 3 dose series if at intermediate or high risk (ex: diabetes, end stage renal disease, liver disease)   Respiratory syncytial virus (RSV) Vaccine - COVERED BY MEDICARE PART D  * RSVPreF3 (Arexvy) CDC recommends that adults 60 years of age and older may receive a single dose of RSV vaccine using shared clinical decision-making (SCDM)   Tetanus (Td) Vaccine - COST NOT COVERED BY MEDICARE PART B Following completion of primary series, a booster dose should be given every 10 years to maintain immunity against tetanus. Td may also be given as tetanus wound prophylaxis.   Tdap Vaccine - COST NOT COVERED BY MEDICARE PART B Recommended at least once for all adults. For pregnant patients, recommended with each pregnancy.   Shingles Vaccine (Shingrix) - COST NOT COVERED BY MEDICARE PART B  2 shot series recommended in those 19 years and older who have or will have weakened immune systems or those 50 years and older     Health Maintenance Due:      Topic Date Due   • Breast Cancer Screening: Mammogram  03/20/2025   • Colorectal Cancer Screening  09/01/2029   • Hepatitis C Screening  Completed     Immunizations Due:  There are no preventive care reminders to display for this patient.  Advance Directives   What are advance directives?  Advance directives are legal documents that state your wishes and plans for medical care. These  plans are made ahead of time in case you lose your ability to make decisions for yourself. Advance directives can apply to any medical decision, such as the treatments you want, and if you want to donate organs.   What are the types of advance directives?  There are many types of advance directives, and each state has rules about how to use them. You may choose a combination of any of the following:  Living will:  This is a written record of the treatment you want. You can also choose which treatments you do not want, which to limit, and which to stop at a certain time. This includes surgery, medicine, IV fluid, and tube feedings.   Durable power of  for healthcare (DPAHC):  This is a written record that states who you want to make healthcare choices for you when you are unable to make them for yourself. This person, called a proxy, is usually a family member or a friend. You may choose more than 1 proxy.  Do not resuscitate (DNR) order:  A DNR order is used in case your heart stops beating or you stop breathing. It is a request not to have certain forms of treatment, such as CPR. A DNR order may be included in other types of advance directives.  Medical directive:  This covers the care that you want if you are in a coma, near death, or unable to make decisions for yourself. You can list the treatments you want for each condition. Treatment may include pain medicine, surgery, blood transfusions, dialysis, IV or tube feedings, and a ventilator (breathing machine).  Values history:  This document has questions about your views, beliefs, and how you feel and think about life. This information can help others choose the care that you would choose.  Why are advance directives important?  An advance directive helps you control your care. Although spoken wishes may be used, it is better to have your wishes written down. Spoken wishes can be misunderstood, or not followed. Treatments may be given even if you do not  want them. An advance directive may make it easier for your family to make difficult choices about your care.   Fall Prevention    Fall prevention  includes ways to make your home and other areas safer. It also includes ways you can move more carefully to prevent a fall. Health conditions that cause changes in your blood pressure, vision, or muscle strength and coordination may increase your risk for falls. Medicines may also increase your risk for falls if they make you dizzy, weak, or sleepy.   Fall prevention tips:   Stand or sit up slowly.    Use assistive devices as directed.    Wear shoes that fit well and have soles that .    Wear a personal alarm.    Stay active.    Manage your medical conditions.    Home Safety Tips:  Add items to prevent falls in the bathroom.    Keep paths clear.    Install bright lights in your home.    Keep items you use often on shelves within reach.    Paint or place reflective tape on the edges of your stairs.    Weight Management   Why it is important to manage your weight:  Being overweight increases your risk of health conditions such as heart disease, high blood pressure, type 2 diabetes, and certain types of cancer. It can also increase your risk for osteoarthritis, sleep apnea, and other respiratory problems. Aim for a slow, steady weight loss. Even a small amount of weight loss can lower your risk of health problems.  How to lose weight safely:  A safe and healthy way to lose weight is to eat fewer calories and get regular exercise. You can lose up about 1 pound a week by decreasing the number of calories you eat by 500 calories each day.   Healthy meal plan for weight management:  A healthy meal plan includes a variety of foods, contains fewer calories, and helps you stay healthy. A healthy meal plan includes the following:  Eat whole-grain foods more often.  A healthy meal plan should contain fiber. Fiber is the part of grains, fruits, and vegetables that is not broken  down by your body. Whole-grain foods are healthy and provide extra fiber in your diet. Some examples of whole-grain foods are whole-wheat breads and pastas, oatmeal, brown rice, and bulgur.  Eat a variety of vegetables every day.  Include dark, leafy greens such as spinach, kale, cynthia greens, and mustard greens. Eat yellow and orange vegetables such as carrots, sweet potatoes, and winter squash.   Eat a variety of fruits every day.  Choose fresh or canned fruit (canned in its own juice or light syrup) instead of juice. Fruit juice has very little or no fiber.  Eat low-fat dairy foods.  Drink fat-free (skim) milk or 1% milk. Eat fat-free yogurt and low-fat cottage cheese. Try low-fat cheeses such as mozzarella and other reduced-fat cheeses.  Choose meat and other protein foods that are low in fat.  Choose beans or other legumes such as split peas or lentils. Choose fish, skinless poultry (chicken or turkey), or lean cuts of red meat (beef or pork). Before you cook meat or poultry, cut off any visible fat.   Use less fat and oil.  Try baking foods instead of frying them. Add less fat, such as margarine, sour cream, regular salad dressing and mayonnaise to foods. Eat fewer high-fat foods. Some examples of high-fat foods include french fries, doughnuts, ice cream, and cakes.  Eat fewer sweets.  Limit foods and drinks that are high in sugar. This includes candy, cookies, regular soda, and sweetened drinks.  Exercise:  Exercise at least 30 minutes per day on most days of the week. Some examples of exercise include walking, biking, dancing, and swimming. You can also fit in more physical activity by taking the stairs instead of the elevator or parking farther away from stores. Ask your healthcare provider about the best exercise plan for you.      © Copyright [employer] 2018 Information is for End User's use only and may not be sold, redistributed or otherwise used for commercial purposes. All illustrations and  images included in CareNotes® are the copyrighted property of [email or web address] or [employer]

## 2024-10-22 ENCOUNTER — APPOINTMENT (OUTPATIENT)
Dept: LAB | Facility: CLINIC | Age: 69
End: 2024-10-22
Payer: MEDICARE

## 2024-10-22 DIAGNOSIS — E89.0 POSTOPERATIVE HYPOTHYROIDISM: ICD-10-CM

## 2024-10-22 DIAGNOSIS — E87.6 HYPOKALEMIA: ICD-10-CM

## 2024-10-22 LAB
ALBUMIN SERPL BCG-MCNC: 3.8 G/DL (ref 3.5–5)
ALP SERPL-CCNC: 65 U/L (ref 34–104)
ALT SERPL W P-5'-P-CCNC: 16 U/L (ref 7–52)
ANION GAP SERPL CALCULATED.3IONS-SCNC: 7 MMOL/L (ref 4–13)
AST SERPL W P-5'-P-CCNC: 17 U/L (ref 13–39)
BILIRUB SERPL-MCNC: 0.47 MG/DL (ref 0.2–1)
BUN SERPL-MCNC: 14 MG/DL (ref 5–25)
CALCIUM SERPL-MCNC: 8.6 MG/DL (ref 8.4–10.2)
CHLORIDE SERPL-SCNC: 108 MMOL/L (ref 96–108)
CO2 SERPL-SCNC: 25 MMOL/L (ref 21–32)
CREAT SERPL-MCNC: 0.69 MG/DL (ref 0.6–1.3)
GFR SERPL CREATININE-BSD FRML MDRD: 90 ML/MIN/1.73SQ M
GLUCOSE SERPL-MCNC: 124 MG/DL (ref 65–140)
POTASSIUM SERPL-SCNC: 4 MMOL/L (ref 3.5–5.3)
PROT SERPL-MCNC: 6.3 G/DL (ref 6.4–8.4)
SODIUM SERPL-SCNC: 140 MMOL/L (ref 135–147)
TSH SERPL DL<=0.05 MIU/L-ACNC: 3.44 UIU/ML (ref 0.45–4.5)

## 2024-10-22 PROCEDURE — 84443 ASSAY THYROID STIM HORMONE: CPT

## 2024-10-22 PROCEDURE — 36415 COLL VENOUS BLD VENIPUNCTURE: CPT

## 2024-10-22 PROCEDURE — 80053 COMPREHEN METABOLIC PANEL: CPT

## 2024-11-16 DIAGNOSIS — M16.0 PRIMARY OSTEOARTHRITIS OF BOTH HIPS: ICD-10-CM

## 2024-11-16 DIAGNOSIS — F41.8 ANXIETY WITH DEPRESSION: ICD-10-CM

## 2024-11-18 RX ORDER — ESCITALOPRAM OXALATE 20 MG/1
20 TABLET ORAL DAILY
Qty: 90 TABLET | Refills: 1 | Status: SHIPPED | OUTPATIENT
Start: 2024-11-18

## 2024-11-18 RX ORDER — MELOXICAM 15 MG/1
15 TABLET ORAL EVERY MORNING
Qty: 90 TABLET | Refills: 1 | Status: SHIPPED | OUTPATIENT
Start: 2024-11-18

## 2024-12-02 DIAGNOSIS — K63.89 BACTERIAL OVERGROWTH SYNDROME: ICD-10-CM

## 2024-12-03 RX ORDER — DIPHENOXYLATE HYDROCHLORIDE AND ATROPINE SULFATE 2.5; .025 MG/1; MG/1
4 TABLET ORAL 2 TIMES DAILY
Qty: 720 TABLET | Refills: 0 | Status: SHIPPED | OUTPATIENT
Start: 2024-12-03

## 2024-12-18 DIAGNOSIS — E87.6 HYPOKALEMIA: ICD-10-CM

## 2024-12-19 RX ORDER — POTASSIUM CHLORIDE 1500 MG/1
20 TABLET, EXTENDED RELEASE ORAL 2 TIMES DAILY
Qty: 180 TABLET | Refills: 1 | Status: SHIPPED | OUTPATIENT
Start: 2024-12-19

## 2025-02-09 DIAGNOSIS — K63.89 BACTERIAL OVERGROWTH SYNDROME: ICD-10-CM

## 2025-02-12 RX ORDER — DIPHENOXYLATE HYDROCHLORIDE AND ATROPINE SULFATE 2.5; .025 MG/1; MG/1
4 TABLET ORAL 2 TIMES DAILY
Qty: 720 TABLET | Refills: 0 | Status: SHIPPED | OUTPATIENT
Start: 2025-02-12

## 2025-03-10 ENCOUNTER — APPOINTMENT (OUTPATIENT)
Dept: RADIOLOGY | Facility: CLINIC | Age: 70
End: 2025-03-10
Payer: COMMERCIAL

## 2025-03-10 ENCOUNTER — OFFICE VISIT (OUTPATIENT)
Dept: FAMILY MEDICINE CLINIC | Facility: CLINIC | Age: 70
End: 2025-03-10
Payer: MEDICARE

## 2025-03-10 VITALS
OXYGEN SATURATION: 95 % | TEMPERATURE: 97.7 F | SYSTOLIC BLOOD PRESSURE: 128 MMHG | WEIGHT: 241 LBS | DIASTOLIC BLOOD PRESSURE: 58 MMHG | BODY MASS INDEX: 42.02 KG/M2 | HEART RATE: 70 BPM

## 2025-03-10 DIAGNOSIS — M25.561 ACUTE PAIN OF RIGHT KNEE: Primary | ICD-10-CM

## 2025-03-10 DIAGNOSIS — M94.0 COSTOCHONDRITIS: ICD-10-CM

## 2025-03-10 DIAGNOSIS — M25.561 ACUTE PAIN OF RIGHT KNEE: ICD-10-CM

## 2025-03-10 PROCEDURE — G2211 COMPLEX E/M VISIT ADD ON: HCPCS | Performed by: FAMILY MEDICINE

## 2025-03-10 PROCEDURE — 73564 X-RAY EXAM KNEE 4 OR MORE: CPT

## 2025-03-10 PROCEDURE — 99214 OFFICE O/P EST MOD 30 MIN: CPT | Performed by: FAMILY MEDICINE

## 2025-03-10 RX ORDER — IBUPROFEN 800 MG/1
800 TABLET, FILM COATED ORAL EVERY 8 HOURS SCHEDULED
Qty: 60 TABLET | Refills: 0 | Status: SHIPPED | OUTPATIENT
Start: 2025-03-10

## 2025-03-10 NOTE — PROGRESS NOTES
Name: Estela Mitchell      : 1955      MRN: [id number]  Encounter Provider: Alexus Coello DO  Encounter Date: 3/10/2025   Encounter department: [facility]  :  Assessment & Plan  Acute pain of right knee  Probable contusion or inflammation of some arthritis from fall and inreased activity with moving.  Orders:  •  Ambulatory Referral to Physical Therapy; Future  •  XR knee 4+ vw right injury; Future  •  ibuprofen (MOTRIN) 800 mg tablet; Take 1 tablet (800 mg total) by mouth every 8 (eight) hours    Body mass index (BMI) 40.0-44.9, adult (HCC)         Costochondritis  Chest pain along right sternum Tender on palpation.            Already has appointment in April.  History of Present Illness   Patient presents with:  Knee Pain: Right   Started 1 week ago  Max pain 10  Fell on knee and put extra pressure on knee in the week before    Chest Pain: Started yesterday  Tightness down the middle    Fell about 2 weeks ago on carpet and felt ok, but then last week was going to get up off the floor from putting something together. Woke up the next day with pain. Has been taking Ibuprofen 800 mg regularly and still painful after a week.  She has been trying to do things because she moved, but very difficult. Can't bend the knee. Has to do stairs one at a time.  Heat does help.  Right sided sternal chest pain.  Hard to get in and out of a car because of knee pain.    Knee Pain   The incident occurred 5 to 7 days ago. The incident occurred at home. The pain is present in the right knee. The pain is at a severity of 7/10.   Chest Pain       Review of Systems   Cardiovascular:  Positive for chest pain.   Musculoskeletal:  Positive for arthralgias and gait problem.       Objective   /58 (BP Location: Left arm, Patient Position: Sitting, Cuff Size: Large)   Pulse 70   Temp 97.7 °F (36.5 °C) (Temporal)   Wt 109 kg (241 lb)   SpO2 95%   BMI 42.02 kg/m²      Physical Exam

## 2025-03-19 NOTE — PROGRESS NOTES
PT Evaluation     Today's date: 3/20/2025  Patient name: Estela Mitchell  : 1955  MRN: [id number]  Referring provider: Alexus Coello DO  Dx:   Encounter Diagnosis     ICD-10-CM    1. Acute pain of right knee  M25.561 Ambulatory Referral to Physical Therapy          Start Time: 1115  Stop Time: 1200  Total time in clinic (min): 45 minutes    Assessment  Impairments: abnormal coordination, abnormal muscle firing, abnormal muscle tone, abnormal or restricted ROM, abnormal movement, activity intolerance, impaired physical strength, lacks appropriate home exercise program, pain with function, participation limitations, activity limitations and endurance  Symptom irritability: moderate    Assessment details: Estela Mitchell is a pleasant 70 y.o. female who presents with right knee pain secondary to suspected diagnosis of right knee muscle strain vs knee OA. She currently demonstrates limitations with her right knee ROM, NM activation/strength/endurance, and pain that limits her participation. Functional impairments include difficulty with standing, walking, stair navigation, squatting, walking her dogs, along with ADL's. Based on listed impairments, Estela Mitchell would benefit from formal physical therapy to address impairments as detailed, decrease pain, and restore maximal level of function for all home, work, and mobility tasks. All patient questions and concerns have been addressed at this time. Thank you for this referral.  Understanding of Dx/Px/POC: good     Prognosis: good    Goals  Short Term Goals:   1. Patient will be independent with initial customized HEP in 1 week  2. Patient will demonstrate improvement in right knee ROM 0-120 in 2-4 weeks.  3. Patient will report a decrease in their symptoms by at least 2 points on VAS with in 2-4 weeks.  4. Patient will demonstrate improved 5xSTS < 12sec in 2-4 weeks  5. Patient will report improved ability to  in 2-4 weeks    Long Term Goals:   1. Patient will  improve FOTO to greater than goal of 64 in 6-8 weeks  2. Patient will eliminiate pain with activity in 6-8 weeks  3. Patient will continue with HEP independence to allow for decreased future reoccurrence of pain and loss in function in 6-8 weeks  4. Patient will demonstrate improvement with walking 30min straight in 6-8 weeks.   5. Patient will report ability to navigate full flight of steps with no issues by discharge.    Plan  Patient would benefit from: skilled physical therapy and PT eval  Planned modality interventions: low level laser therapy, cryotherapy, electrical stimulation/Russian stimulation, TENS and thermotherapy: hydrocollator packs    Planned therapy interventions: IASTM, joint mobilization, kinesiology taping, massage, manual therapy, Humphrey taping, balance/weight bearing training, body mechanics training, neuromuscular re-education, nerve gliding, coordination, strengthening, stretching, therapeutic activities, therapeutic exercise, home exercise program, graded exercise and graded motor    Frequency: 1-2x week  Duration in weeks: 8  Plan of Care beginning date: 3/20/2025  Plan of Care expiration date: 5/15/2025  Treatment plan discussed with: patient  Plan details: Prognosis above is given PT services 2x/week tapering to 1x/week over the next 6-8 weeks and home program adherence.      Subjective Evaluation    History of Present Illness  Mechanism of injury: Patient is 70 y.o. female that presents to PT with CC of right knee pain after falling at the beginning of march. Reports that she has been very active since she moved about a month ago and she has been having to spend a long time squatting, lunging, and on the floor. Reports that she feels a lot of tightness in the medial part of the knee and posteriorly. Reports that she was taking motrin but this seemed to upset her stomach and she switched back to taking meloxicam. Patient reports the pain is reproduced with navigating steps and walking  on an incline.Patient's goals from PT include eliminate pain and return to PLOF.         Quality of life: good    Patient Goals  Patient goals for therapy: decreased pain, improved balance, increased motion, increased strength and independence with ADLs/IADLs    Pain  Current pain rating: 3  At best pain ratin  At worst pain ratin  Location: Medial right knee  Relieving factors: medications, heat, rest and support  Aggravating factors: standing, walking and stair climbing  Progression: no change    Social Support  Steps to enter house: yes  Stairs in house: yes   Lives in: multiple-level home    Employment status: not working  Life stress: Currently in procress of moving      Diagnostic Tests  X-ray: normal      Objective     Neurological Testing     Sensation     Knee   Left Knee   Intact: Light touch    Right Knee   Intact: light touch     Reflexes   Left   Patellar (L4): normal (2+)  Achilles (S1): normal (2+)    Right   Patellar (L4): normal (2+)  Achilles (S1): normal (2+)    Active Range of Motion   Left Knee   Normal active range of motion  Flexion: 125 degrees   Extension: 0 degrees     Right Knee   Flexion: 80 degrees with pain  Extension: -5 degrees     Passive Range of Motion   Left Knee   Flexion: 128 degrees   Extension: 0 degrees     Right Knee   Flexion: 95 degrees with pain  Extension: -5 degrees with pain    Mobility   Patellar Mobility:   Left Knee   Hypomobile: left medial, left lateral, left superior and left inferior    Right Knee   Hypomobile: medial, lateral, superior and inferior     Strength/Myotome Testing     Left Hip   Planes of Motion   Flexion: 4+  Extension: 4  Abduction: 4-  Adduction: 4  External rotation: 4  Internal rotation: 4    Right Hip   Planes of Motion   Flexion: 4  Extension: 4-  Abduction: 4-  Adduction: 4-  External rotation: 3+  Internal rotation: 3+    Left Knee   Flexion: 5  Extension: 5  Quadriceps contraction: good    Right Knee   Flexion: 4-  Extension:  "4  Quadriceps contraction: poor    Tests     Right Knee   Negative lateral Pro, medial Pro and pivot shift.     Ambulation   Weight-Bearing Status   Weight-Bearing Status (Right): weight-bearing as tolerated      Ambulation: Stairs   Pattern: non-reciprocal  Railings: one rail  Pattern: non-reciprocal  Railings: one rail      Flowsheet Rows      Flowsheet Row Most Recent Value   PT/OT G-Codes    Current Score 51   Projected Score 64               Precautions: OHT  Manuals IE 3/20                     Knee ROM HB                     Patellar mobs                      STM                                               Neuro Re-Ed                       Quad sets                      SLR                      SAQ 3x10x5\"                                                                                                                     Ther Ex                       Bike nv            Heel slides 10x10\"                     Leg press nv            SL Abd Standing     2x10 ea                     Gastroc stretch  3x30\"                     Prone extension                       HR/TR                      Mini squat  nv                                                                     Ther Activity                                                                       Gait Training                                                                       Modalities                                                                                "

## 2025-03-20 ENCOUNTER — EVALUATION (OUTPATIENT)
Dept: PHYSICAL THERAPY | Facility: CLINIC | Age: 70
End: 2025-03-20
Payer: COMMERCIAL

## 2025-03-20 DIAGNOSIS — M25.561 ACUTE PAIN OF RIGHT KNEE: ICD-10-CM

## 2025-03-20 PROCEDURE — 97110 THERAPEUTIC EXERCISES: CPT

## 2025-03-20 PROCEDURE — 97161 PT EVAL LOW COMPLEX 20 MIN: CPT

## 2025-03-27 ENCOUNTER — OFFICE VISIT (OUTPATIENT)
Dept: PHYSICAL THERAPY | Facility: CLINIC | Age: 70
End: 2025-03-27
Payer: COMMERCIAL

## 2025-03-27 DIAGNOSIS — M25.561 ACUTE PAIN OF RIGHT KNEE: Primary | ICD-10-CM

## 2025-03-27 PROCEDURE — 97112 NEUROMUSCULAR REEDUCATION: CPT

## 2025-03-27 PROCEDURE — 97110 THERAPEUTIC EXERCISES: CPT

## 2025-03-27 NOTE — PROGRESS NOTES
"Daily Note     Today's date: 3/27/2025  Patient name: Estela Mitchell  : 1955  MRN: [id number]  Referring provider: Alexus Coello DO  Dx:   Encounter Diagnosis     ICD-10-CM    1. Acute pain of right knee  M25.561           Start Time: 1355  Stop Time: 1445  Total time in clinic (min): 50 minutes    Subjective: Patient reports that she has been doing a lot better than last week. Continues to work on HEP but she feels tightness in the top of her knee when having to sit/squat.       Objective: See treatment diary below      Assessment: Tolerated treatment well. Progressing as tolerated with standing activities. Tolerated step ups on 4\" step forward but had some difficulty with lateral step ups. Will attempt again nv. Responds well to manual therapy first, bike, and the progressive strengthening. Patient demonstrated fatigue post treatment, exhibited good technique with therapeutic exercises, and would benefit from continued PT      Plan: Continue per plan of care.      Precautions: OHT  Manuals IE 3/20  3/27                   Knee ROM HB  HB                   Patellar mobs                      STM   Distal Hamstrings, Gastroc, Quad                                           Neuro Re-Ed                       Quad sets                      SLR   nv     #2                   SAQ 3x10x5\" HEP                   LAQ   2x10x3\"                                                                                          Ther Ex                       Bike nv 5' Lv 1           Heel slides 10x10\"  HEP                   Leg press nv 3x10  #75           SL Abd Standing     2x10 ea  Standing extension    3x5                   Gastroc stretch  3x30\"  3x30\"                   Prone extension                       HR/TR                      Mini squat                      Step ups2x   2x10 fwd    4\"                                           Ther Activity                                                                       Gait Training   "                                                                     Modalities

## 2025-03-28 DIAGNOSIS — K22.70 BARRETT'S ESOPHAGUS WITHOUT DYSPLASIA: ICD-10-CM

## 2025-03-28 RX ORDER — OMEPRAZOLE 40 MG/1
40 CAPSULE, DELAYED RELEASE ORAL 2 TIMES DAILY
Qty: 180 CAPSULE | Refills: 1 | Status: SHIPPED | OUTPATIENT
Start: 2025-03-28

## 2025-04-03 ENCOUNTER — OFFICE VISIT (OUTPATIENT)
Dept: PHYSICAL THERAPY | Facility: CLINIC | Age: 70
End: 2025-04-03
Payer: COMMERCIAL

## 2025-04-03 DIAGNOSIS — M25.561 ACUTE PAIN OF RIGHT KNEE: Primary | ICD-10-CM

## 2025-04-03 PROCEDURE — 97112 NEUROMUSCULAR REEDUCATION: CPT | Performed by: PHYSICAL THERAPIST

## 2025-04-03 PROCEDURE — 97140 MANUAL THERAPY 1/> REGIONS: CPT | Performed by: PHYSICAL THERAPIST

## 2025-04-03 PROCEDURE — 97110 THERAPEUTIC EXERCISES: CPT | Performed by: PHYSICAL THERAPIST

## 2025-04-03 NOTE — PROGRESS NOTES
"Daily Note     Today's date: 4/3/2025  Patient name: Estela Mitchell  : 1955  MRN: [id number]  Referring provider: Alexus Coello DO  Dx:   Encounter Diagnosis     ICD-10-CM    1. Acute pain of right knee  M25.561                      Subjective: Patient stated minimal pain prior to treatment session.       Objective: See treatment diary below      Assessment: Patient required verbal cueing to avoid anterior translation of knees during mini squat exercise; demonstrated moderate challenge with SLR flexion with 1# weight.       Plan: Continue per plan of care.      Precautions: OHT  Manuals IE 3/20  3/27  4/3                 Knee ROM HB  HB  KK                 Patellar mobs                      STM   Distal Hamstrings, Gastroc, Quad  Distal Hamstrings, Gastroc, Quad                                         Neuro Re-Ed                       Quad sets                      SLR   nv     #2 1# 2x10                 SAQ 3x10x5\" HEP                   LAQ   2x10x3\" 2x10x3\"                                                                                         Ther Ex                       Bike nv 5' Lv 1 5' lv 1          Heel slides 10x10\"  HEP                   Leg press nv 3x10  #75 65# 10x 75# 2x10          SL Abd Standing     2x10 ea  Standing extension    3x5  Standing extension    3x5                 Gastroc stretch  3x30\"  3x30\"                  Prone extension                       HR/TR                      Mini squat     10x                 Step ups2x   2x10 fwd    4\"  4\" 20x lat + 20x fwd                                         Ther Activity                                                                       Gait Training                                                                       Modalities                                                                                "

## 2025-04-10 ENCOUNTER — APPOINTMENT (OUTPATIENT)
Dept: PHYSICAL THERAPY | Facility: CLINIC | Age: 70
End: 2025-04-10
Payer: COMMERCIAL

## 2025-04-11 ENCOUNTER — APPOINTMENT (OUTPATIENT)
Dept: PHYSICAL THERAPY | Facility: CLINIC | Age: 70
End: 2025-04-11
Payer: COMMERCIAL

## 2025-04-15 ENCOUNTER — OFFICE VISIT (OUTPATIENT)
Dept: FAMILY MEDICINE CLINIC | Facility: CLINIC | Age: 70
End: 2025-04-15
Payer: COMMERCIAL

## 2025-04-15 VITALS
HEART RATE: 65 BPM | SYSTOLIC BLOOD PRESSURE: 112 MMHG | OXYGEN SATURATION: 97 % | HEIGHT: 64 IN | BODY MASS INDEX: 40.46 KG/M2 | WEIGHT: 237 LBS | TEMPERATURE: 97.8 F | DIASTOLIC BLOOD PRESSURE: 72 MMHG

## 2025-04-15 DIAGNOSIS — F41.8 ANXIETY WITH DEPRESSION: ICD-10-CM

## 2025-04-15 DIAGNOSIS — Z13.6 ENCOUNTER FOR LIPID SCREENING FOR CARDIOVASCULAR DISEASE: ICD-10-CM

## 2025-04-15 DIAGNOSIS — J45.20 MILD INTERMITTENT ASTHMA WITHOUT COMPLICATION: ICD-10-CM

## 2025-04-15 DIAGNOSIS — M81.0 AGE-RELATED OSTEOPOROSIS WITHOUT CURRENT PATHOLOGICAL FRACTURE: ICD-10-CM

## 2025-04-15 DIAGNOSIS — E83.51 HYPOCALCEMIA: ICD-10-CM

## 2025-04-15 DIAGNOSIS — E55.9 VITAMIN D DEFICIENCY: ICD-10-CM

## 2025-04-15 DIAGNOSIS — Z13.220 ENCOUNTER FOR LIPID SCREENING FOR CARDIOVASCULAR DISEASE: ICD-10-CM

## 2025-04-15 DIAGNOSIS — E89.0 POSTOPERATIVE HYPOTHYROIDISM: Primary | ICD-10-CM

## 2025-04-15 DIAGNOSIS — M15.9 GENERALIZED OSTEOARTHRITIS OF MULTIPLE SITES: ICD-10-CM

## 2025-04-15 PROCEDURE — 99214 OFFICE O/P EST MOD 30 MIN: CPT | Performed by: FAMILY MEDICINE

## 2025-04-15 RX ORDER — MELOXICAM 15 MG/1
15 TABLET ORAL DAILY
Qty: 90 TABLET | Refills: 1 | Status: SHIPPED | OUTPATIENT
Start: 2025-04-15

## 2025-04-15 RX ORDER — MELOXICAM 15 MG/1
TABLET ORAL
COMMUNITY
Start: 2025-04-14 | End: 2025-04-15 | Stop reason: SDUPTHER

## 2025-04-15 NOTE — ASSESSMENT & PLAN NOTE
Will try decreasing Escitalopram to 10 mg for three to four weeks. She will let me know how she is doing and then may try to decrease to 5 mg.

## 2025-04-15 NOTE — PROGRESS NOTES
Name: Estela Mitchell      : 1955      MRN: [id number]  Encounter Provider: Alexus Coello DO  Encounter Date: 4/15/2025   Encounter department: [facility]  :  Assessment & Plan  Postoperative hypothyroidism    Orders:  •  Comprehensive metabolic panel; Future  •  TSH, 3rd generation with Free T4 reflex; Future  •  CBC and differential; Future    Generalized osteoarthritis of multiple sites  Much improvement with right knee with PT. Back has been thrown off a bit.  Orders:  •  meloxicam (MOBIC) 15 mg tablet; Take 1 tablet (15 mg total) by mouth daily    Mild intermittent asthma without complication  Will get RSV at the pharmacy.       Age-related osteoporosis without current pathological fracture    Orders:  •  Comprehensive metabolic panel; Future  •  Vitamin D 25 hydroxy; Future    Anxiety with depression  Will try decreasing Escitalopram to 10 mg for three to four weeks. She will let me know how she is doing and then may try to decrease to 5 mg.         Vitamin D deficiency         Hypocalcemia         Encounter for lipid screening for cardiovascular disease    Orders:  •  Lipid Panel with Direct LDL reflex; Future          Falls Plan of Care: balance, strength, and gait training instructions were provided.     Medicare Wellness in six months    History of Present Illness   Patient is seen for follow up of chronic medical conditions.      Review of Systems   Constitutional:  Negative for chills and fever.   HENT:  Negative for congestion and sore throat.    Respiratory:  Negative for chest tightness.    Cardiovascular:  Negative for chest pain and palpitations.   Gastrointestinal:  Negative for abdominal pain, constipation, diarrhea and nausea.   Genitourinary:  Negative for difficulty urinating.   Skin: Negative.    Neurological:  Negative for dizziness and headaches.   Psychiatric/Behavioral: Negative.         Objective   /72 (BP Location: Left arm, Patient Position: Sitting,  "Cuff Size: Large)   Pulse 65   Temp 97.8 °F (36.6 °C) (Temporal)   Ht 5' 3.5\" (1.613 m)   Wt 108 kg (237 lb)   SpO2 97%   BMI 41.32 kg/m²      Physical Exam  Vitals and nursing note reviewed.   Constitutional:       General: She is not in acute distress.  Neck:      Thyroid: No thyromegaly.   Cardiovascular:      Rate and Rhythm: Normal rate and regular rhythm.      Heart sounds: Normal heart sounds.   Pulmonary:      Effort: Pulmonary effort is normal.      Breath sounds: Normal breath sounds.   Musculoskeletal:      Right lower leg: No edema.      Left lower leg: No edema.   Lymphadenopathy:      Cervical: No cervical adenopathy.   Skin:     General: Skin is warm and dry.   Neurological:      Mental Status: She is alert and oriented to person, place, and time.   Psychiatric:         Mood and Affect: Mood normal.         "

## 2025-04-15 NOTE — ASSESSMENT & PLAN NOTE
Much improvement with right knee with PT. Back has been thrown off a bit.  Orders:  •  meloxicam (MOBIC) 15 mg tablet; Take 1 tablet (15 mg total) by mouth daily

## 2025-04-15 NOTE — ASSESSMENT & PLAN NOTE
Orders:  •  Comprehensive metabolic panel; Future  •  TSH, 3rd generation with Free T4 reflex; Future  •  CBC and differential; Future

## 2025-04-17 ENCOUNTER — APPOINTMENT (OUTPATIENT)
Dept: LAB | Facility: CLINIC | Age: 70
End: 2025-04-17
Payer: COMMERCIAL

## 2025-04-17 ENCOUNTER — OFFICE VISIT (OUTPATIENT)
Dept: PHYSICAL THERAPY | Facility: CLINIC | Age: 70
End: 2025-04-17
Payer: COMMERCIAL

## 2025-04-17 DIAGNOSIS — Z13.220 ENCOUNTER FOR LIPID SCREENING FOR CARDIOVASCULAR DISEASE: ICD-10-CM

## 2025-04-17 DIAGNOSIS — Z13.6 ENCOUNTER FOR LIPID SCREENING FOR CARDIOVASCULAR DISEASE: ICD-10-CM

## 2025-04-17 DIAGNOSIS — M25.561 ACUTE PAIN OF RIGHT KNEE: Primary | ICD-10-CM

## 2025-04-17 DIAGNOSIS — E89.0 POSTOPERATIVE HYPOTHYROIDISM: ICD-10-CM

## 2025-04-17 DIAGNOSIS — M81.0 AGE-RELATED OSTEOPOROSIS WITHOUT CURRENT PATHOLOGICAL FRACTURE: ICD-10-CM

## 2025-04-17 LAB
25(OH)D3 SERPL-MCNC: 59.4 NG/ML (ref 30–100)
ALBUMIN SERPL BCG-MCNC: 3.9 G/DL (ref 3.5–5)
ALP SERPL-CCNC: 90 U/L (ref 34–104)
ALT SERPL W P-5'-P-CCNC: 16 U/L (ref 7–52)
ANION GAP SERPL CALCULATED.3IONS-SCNC: 11 MMOL/L (ref 4–13)
AST SERPL W P-5'-P-CCNC: 18 U/L (ref 13–39)
BASOPHILS # BLD AUTO: 0.04 THOUSANDS/ÂΜL (ref 0–0.1)
BASOPHILS NFR BLD AUTO: 1 % (ref 0–1)
BILIRUB SERPL-MCNC: 0.52 MG/DL (ref 0.2–1)
BUN SERPL-MCNC: 22 MG/DL (ref 5–25)
CALCIUM SERPL-MCNC: 8.3 MG/DL (ref 8.4–10.2)
CHLORIDE SERPL-SCNC: 107 MMOL/L (ref 96–108)
CHOLEST SERPL-MCNC: 147 MG/DL (ref ?–200)
CO2 SERPL-SCNC: 23 MMOL/L (ref 21–32)
CREAT SERPL-MCNC: 0.61 MG/DL (ref 0.6–1.3)
EOSINOPHIL # BLD AUTO: 0.33 THOUSAND/ÂΜL (ref 0–0.61)
EOSINOPHIL NFR BLD AUTO: 5 % (ref 0–6)
ERYTHROCYTE [DISTWIDTH] IN BLOOD BY AUTOMATED COUNT: 15.7 % (ref 11.6–15.1)
GFR SERPL CREATININE-BSD FRML MDRD: 93 ML/MIN/1.73SQ M
GLUCOSE P FAST SERPL-MCNC: 86 MG/DL (ref 65–99)
HCT VFR BLD AUTO: 38.2 % (ref 34.8–46.1)
HDLC SERPL-MCNC: 61 MG/DL
HGB BLD-MCNC: 12.1 G/DL (ref 11.5–15.4)
IMM GRANULOCYTES # BLD AUTO: 0.03 THOUSAND/UL (ref 0–0.2)
IMM GRANULOCYTES NFR BLD AUTO: 0 % (ref 0–2)
LDLC SERPL CALC-MCNC: 72 MG/DL (ref 0–100)
LYMPHOCYTES # BLD AUTO: 1.96 THOUSANDS/ÂΜL (ref 0.6–4.47)
LYMPHOCYTES NFR BLD AUTO: 28 % (ref 14–44)
MCH RBC QN AUTO: 29.5 PG (ref 26.8–34.3)
MCHC RBC AUTO-ENTMCNC: 31.7 G/DL (ref 31.4–37.4)
MCV RBC AUTO: 93 FL (ref 82–98)
MONOCYTES # BLD AUTO: 0.69 THOUSAND/ÂΜL (ref 0.17–1.22)
MONOCYTES NFR BLD AUTO: 10 % (ref 4–12)
NEUTROPHILS # BLD AUTO: 4.04 THOUSANDS/ÂΜL (ref 1.85–7.62)
NEUTS SEG NFR BLD AUTO: 56 % (ref 43–75)
NRBC BLD AUTO-RTO: 0 /100 WBCS
PLATELET # BLD AUTO: 264 THOUSANDS/UL (ref 149–390)
PMV BLD AUTO: 12.3 FL (ref 8.9–12.7)
POTASSIUM SERPL-SCNC: 3.6 MMOL/L (ref 3.5–5.3)
PROT SERPL-MCNC: 6.4 G/DL (ref 6.4–8.4)
RBC # BLD AUTO: 4.1 MILLION/UL (ref 3.81–5.12)
SODIUM SERPL-SCNC: 141 MMOL/L (ref 135–147)
T4 FREE SERPL-MCNC: 1.31 NG/DL (ref 0.61–1.12)
TRIGL SERPL-MCNC: 72 MG/DL (ref ?–150)
TSH SERPL DL<=0.05 MIU/L-ACNC: 7.61 UIU/ML (ref 0.45–4.5)
WBC # BLD AUTO: 7.09 THOUSAND/UL (ref 4.31–10.16)

## 2025-04-17 PROCEDURE — 84443 ASSAY THYROID STIM HORMONE: CPT

## 2025-04-17 PROCEDURE — 85025 COMPLETE CBC W/AUTO DIFF WBC: CPT

## 2025-04-17 PROCEDURE — 80053 COMPREHEN METABOLIC PANEL: CPT

## 2025-04-17 PROCEDURE — 97140 MANUAL THERAPY 1/> REGIONS: CPT

## 2025-04-17 PROCEDURE — 84439 ASSAY OF FREE THYROXINE: CPT

## 2025-04-17 PROCEDURE — 97110 THERAPEUTIC EXERCISES: CPT

## 2025-04-17 PROCEDURE — 36415 COLL VENOUS BLD VENIPUNCTURE: CPT

## 2025-04-17 PROCEDURE — 82306 VITAMIN D 25 HYDROXY: CPT

## 2025-04-17 PROCEDURE — 80061 LIPID PANEL: CPT

## 2025-04-17 NOTE — PROGRESS NOTES
"Daily Note     Today's date: 2025  Patient name: Estela Mitchell  : 1955  MRN: [id number]  Referring provider: Alexus Coello DO  Dx:   Encounter Diagnosis     ICD-10-CM    1. Acute pain of right knee  M25.561           Start Time: 1117  Stop Time: 1200  Total time in clinic (min): 43 minutes    Subjective: Patient reports that she is doing better than her IE. Reports that she is still having pain in her knee most times. Reports that she still has some pain with steps as well and getting down to the floor is difficult.       Objective: See treatment diary below      Assessment: Patient continues to progress well. Demonstrating limitations in supine activities 2nd to LBP. Continue to modify TE to standing activities and progressing them to patient tolerance. Continue to progress as tolerated. Will continue therapy 1x a week.       Plan: Continue per plan of care.      Precautions: OHT  Manuals IE 3/20  3/27  4/3  4/17               Knee ROM HB  HB  KK HB               Patellar mobs                      STM   Distal Hamstrings, Gastroc, Quad  Distal Hamstrings, Gastroc, Quad  Distal Hamstrings, Gastroc, Quad                                       Neuro Re-Ed                       Quad sets                      SLR   nv     #2 1# 2x10  1#    3x10               SAQ 3x10x5\" HEP                   LAQ   2x10x3\" 2x10x3\"  2x10x3\"    #1                                                                                       Ther Ex                       Bike nv 5' Lv 1 5' lv 1 5' lV 3         Heel slides 10x10\"  HEP                   Leg press nv 3x10  #75 65# 10x 75# 2x10 #85  3x10         SL Abd Standing     2x10 ea  Standing extension    3x5  Standing extension    3x5  Standing extension    2x10   OTB               Gastroc stretch  3x30\"  3x30\" 3x30\"  3x30\"               Prone extension                       HR/TR                      Mini squat     10x  nv               Step ups2x   2x10 fwd    4\"  4\" 20x lat + 20x " fwd  nv                                       Ther Activity                                                                       Gait Training                                                                       Modalities

## 2025-04-24 ENCOUNTER — RESULTS FOLLOW-UP (OUTPATIENT)
Dept: FAMILY MEDICINE CLINIC | Facility: CLINIC | Age: 70
End: 2025-04-24

## 2025-04-24 DIAGNOSIS — E89.0 POSTOPERATIVE HYPOTHYROIDISM: Primary | ICD-10-CM

## 2025-04-24 DIAGNOSIS — E83.51 HYPOCALCEMIA: ICD-10-CM

## 2025-04-25 ENCOUNTER — OFFICE VISIT (OUTPATIENT)
Dept: PHYSICAL THERAPY | Facility: CLINIC | Age: 70
End: 2025-04-25
Payer: COMMERCIAL

## 2025-04-25 DIAGNOSIS — M25.561 ACUTE PAIN OF RIGHT KNEE: Primary | ICD-10-CM

## 2025-04-25 PROCEDURE — 97140 MANUAL THERAPY 1/> REGIONS: CPT

## 2025-04-25 PROCEDURE — 97110 THERAPEUTIC EXERCISES: CPT

## 2025-04-25 NOTE — PROGRESS NOTES
"Daily Note     Today's date: 2025  Patient name: Estela Mitchell  : 1955  MRN: [id number]  Referring provider: Alexus Coello DO  Dx:   Encounter Diagnosis     ICD-10-CM    1. Acute pain of right knee  M25.561           Start Time: 1110  Stop Time: 1155  Total time in clinic (min): 45 minutes    Subjective: Patient reports doing somewhat better but reports that she always feel limited with standing activities 2nd to pain in her left hip. Reports that she feels that she feels her right leg feels shorter than the left.       Objective: See treatment diary below      Assessment: Patient continues to progress well. Assessed leg length today and her right pelvis presented posteriorly tilted. Provided MET with leno today and this seemed to improve her symptoms. Updated HEP with this today. Tolerated standing activities today better than lv and progressed to tolerance. Will follow up next visit with left hip symptoms vs right knee.       Plan: Continue per plan of care.      Precautions: OHT  Manuals IE 3/20  3/27  4/3  4/17  4/25             Knee ROM HB  HB  KK HB  HB             Patellar mobs                      STM   Distal Hamstrings, Gastroc, Quad  Distal Hamstrings, Gastroc, Quad  Distal Hamstrings, Gastroc, Quad  Distal Hamstrings, Gastroc, Quad                                     Neuro Re-Ed                       MET         R ant, L posterior             SLR   nv     #2 1# 2x10  1#    3x10  nv             SAQ 3x10x5\" HEP                   LAQ   2x10x3\" 2x10x3\"  2x10x3\"    #1  nv              Bridge         3x10  + ball squeeze                                                             Ther Ex                       Bike nv 5' Lv 1 5' lv 1 5' lV 3 5' lV 3        Heel slides 10x10\"  HEP                   Leg press nv 3x10  #75 65# 10x 75# 2x10 #85  3x10 #85  3x10        SL Abd Standing     2x10 ea  Standing extension    3x5  Standing extension    3x5  Standing extension    2x10   OTB  Standing " "extension    2x10   OTB             Gastroc stretch  3x30\"  3x30\" 3x30\"  3x30\"  3x30\"             Prone extension                       HR/TR                      Mini squat     10x  nv               Step ups2x   2x10 fwd    4\"  4\" 20x lat + 20x fwd  nv                                       Ther Activity                                                                       Gait Training                                                                       Modalities                                                                                "

## 2025-05-01 ENCOUNTER — OFFICE VISIT (OUTPATIENT)
Dept: PHYSICAL THERAPY | Facility: CLINIC | Age: 70
End: 2025-05-01
Attending: FAMILY MEDICINE
Payer: COMMERCIAL

## 2025-05-01 DIAGNOSIS — M25.561 ACUTE PAIN OF RIGHT KNEE: Primary | ICD-10-CM

## 2025-05-01 PROCEDURE — 97140 MANUAL THERAPY 1/> REGIONS: CPT

## 2025-05-01 PROCEDURE — 97110 THERAPEUTIC EXERCISES: CPT

## 2025-05-01 NOTE — PROGRESS NOTES
"Daily Note     Today's date: 2025  Patient name: Estela Mitchell  : 1955  MRN: [id number]  Referring provider: Alexus Coello DO  Dx:   Encounter Diagnosis     ICD-10-CM    1. Acute pain of right knee  M25.561             Start Time: 1115  Stop Time: 1155  Total time in clinic (min): 40 minutes  One on one time [id number] (40min)    Subjective: Patient reports hat that she has some good days and some bad days. Reports that she still thinks that her knee is getting better little.       Objective: See treatment diary below      Assessment: Patient continues to progress well.     Assessed leg length today and her right pelvis presented posteriorly tilted. Provided MET with leno today and this seemed to improve her symptoms. Updated HEP with this today. Tolerated standing activities today better than lv and progressed to tolerance. Will follow up next visit with left hip symptoms vs right knee.       Plan: Continue per plan of care.      Precautions: OHT  Manuals IE 3/20  3/27  4/3  4/17  4/25  5/1           Knee ROM HB  HB  KK HB  HB  HB           Patellar mobs                      STM   Distal Hamstrings, Gastroc, Quad  Distal Hamstrings, Gastroc, Quad  Distal Hamstrings, Gastroc, Quad  Distal Hamstrings, Gastroc, Quad  Distal Hamstrings, Gastroc, Quad                                   Neuro Re-Ed                       MET         R ant, L posterior  R ant, L posterior           SLR   nv     #2 1# 2x10  1#    3x10  nv             SL lateral tap down      3x6  4\" step       SAQ 3x10x5\" HEP                   LAQ   2x10x3\" 2x10x3\"  2x10x3\"    #1  nv              Bridge         3x10  + ball squeeze  3x10  + ball squeeze           Hip hike            2x10  Off step                                   Ther Ex                       Bike nv 5' Lv 1 5' lv 1 5' lV 3 5' lV 3 5' lV 3       Heel slides 10x10\"  HEP                   Leg press nv 3x10  #75 65# 10x 75# 2x10 #85  3x10 #85  3x10 #95  3x10       SL Abd Standing " "    2x10 ea  Standing extension    3x5  Standing extension    3x5  Standing extension    2x10   OTB  Standing extension    2x10   OTB  Standing extension    2x10   OTB           Gastroc stretch  3x30\"  3x30\" 3x30\"  3x30\"  3x30\"             Prone extension                       HR/TR                      Mini squat     10x  nv               Step ups2x   2x10 fwd    4\"  4\" 20x lat + 20x fwd  nv                                       Ther Activity                                                                       Gait Training                                                                       Modalities                                                                                "

## 2025-05-09 ENCOUNTER — OFFICE VISIT (OUTPATIENT)
Dept: PHYSICAL THERAPY | Facility: CLINIC | Age: 70
End: 2025-05-09
Payer: COMMERCIAL

## 2025-05-09 DIAGNOSIS — M25.561 ACUTE PAIN OF RIGHT KNEE: Primary | ICD-10-CM

## 2025-05-09 PROCEDURE — 97140 MANUAL THERAPY 1/> REGIONS: CPT

## 2025-05-09 PROCEDURE — 97110 THERAPEUTIC EXERCISES: CPT

## 2025-05-09 NOTE — PROGRESS NOTES
"Daily Note     Today's date: 2025  Patient name: Estela Mitchell  : 1955  MRN: [id number]  Referring provider: Alexus Coello DO  Dx:   Encounter Diagnosis     ICD-10-CM    1. Acute pain of right knee  M25.561           Start Time: 1130  Stop Time: 1215  Total time in clinic (min): 45 minutes      Subjective: Patient reports that she continues to see improvement in her right knee and hip. Does report that her left knee has began to bother her a little. Thinks it could be due to compensating for her right knee.      Objective: See treatment diary below      Assessment: Patient continues to progress well. Will RE next visit and determine plan for moving forward. Seeing positive progress in her hips and right knee. Updated HEP with wilmer today. Tolerated standing activities today better than lv and progressed to tolerance. Will follow up next visit with left hip symptoms vs right knee.       Plan: Continue per plan of care.      Precautions: OHT  Manuals IE 3/20  3/27  4/3  4/17  4/25  5/1  5/9         Knee ROM HB  HB  KK HB  HB  HB  HB         Patellar mobs                      STM   Distal Hamstrings, Gastroc, Quad  Distal Hamstrings, Gastroc, Quad  Distal Hamstrings, Gastroc, Quad  Distal Hamstrings, Gastroc, Quad  Distal Hamstrings, Gastroc, Quad   Distal Hamstrings, Gastroc, Quad                                 Neuro Re-Ed                       MET         R ant, L posterior  R ant, L posterior           SLR   nv     #2 1# 2x10  1#    3x10  nv             SL lateral tap down      3x6  4\" step 3x6  4\" step      SAQ 3x10x5\" HEP                   LAQ   2x10x3\" 2x10x3\"  2x10x3\"    #1  nv              Bridge         3x10  + ball squeeze  3x10  + ball squeeze  3x10  + ball squeeze         Hip hike            2x10  Off step  2x10  Off step                                 Ther Ex                       Bike nv 5' Lv 1 5' lv 1 5' lV 3 5' lV 3 5' lV 3 5 Lv 3'      Heel slides 10x10\"  HEP                   Leg " "press nv 3x10  #75 65# 10x 75# 2x10 #85  3x10 #85  3x10 #95  3x10 #95  3x10      SL clam       2x10 GTB   ea      SL Abd Standing     2x10 ea  Standing extension    3x5  Standing extension    3x5  Standing extension    2x10   OTB  Standing extension    2x10   OTB  Standing extension    2x10   OTB  Standing extension    2x10   OTB         Gastroc stretch  3x30\"  3x30\" 3x30\"  3x30\"  3x30\"             Prone extension                       HR/TR                      Mini squat     10x  nv               Step ups2x   2x10 fwd    4\"  4\" 20x lat + 20x fwd  nv                                       Ther Activity                                                                       Gait Training                                                                       Modalities                                                                                "

## 2025-05-16 ENCOUNTER — TELEPHONE (OUTPATIENT)
Age: 70
End: 2025-05-16

## 2025-05-16 DIAGNOSIS — E87.6 HYPOKALEMIA: ICD-10-CM

## 2025-05-16 DIAGNOSIS — K63.89 BACTERIAL OVERGROWTH SYNDROME: ICD-10-CM

## 2025-05-16 RX ORDER — POTASSIUM CHLORIDE 1500 MG/1
20 TABLET, EXTENDED RELEASE ORAL 2 TIMES DAILY
Qty: 180 TABLET | Refills: 1 | Status: SHIPPED | OUTPATIENT
Start: 2025-05-16

## 2025-05-16 RX ORDER — DIPHENOXYLATE HYDROCHLORIDE AND ATROPINE SULFATE 2.5; .025 MG/1; MG/1
4 TABLET ORAL 2 TIMES DAILY
Qty: 720 TABLET | Refills: 0 | Status: SHIPPED | OUTPATIENT
Start: 2025-05-16

## 2025-05-16 NOTE — TELEPHONE ENCOUNTER
Patient contacted the office this afternoon. Wanted to relay to pcp that she tried to decrease in dosing of the escitalopram (LEXAPRO) 20 mg tablet, but is back to the 20mg at this time. Wanted to make pcp aware.

## 2025-05-17 DIAGNOSIS — E89.0 POSTOPERATIVE HYPOTHYROIDISM: ICD-10-CM

## 2025-05-19 ENCOUNTER — EVALUATION (OUTPATIENT)
Dept: PHYSICAL THERAPY | Facility: CLINIC | Age: 70
End: 2025-05-19
Attending: FAMILY MEDICINE
Payer: COMMERCIAL

## 2025-05-19 DIAGNOSIS — M25.561 ACUTE PAIN OF RIGHT KNEE: Primary | ICD-10-CM

## 2025-05-19 PROCEDURE — 97110 THERAPEUTIC EXERCISES: CPT

## 2025-05-19 PROCEDURE — 97140 MANUAL THERAPY 1/> REGIONS: CPT

## 2025-05-19 RX ORDER — LEVOTHYROXINE SODIUM 125 UG/1
TABLET ORAL
Qty: 24 TABLET | Refills: 1 | Status: SHIPPED | OUTPATIENT
Start: 2025-05-19

## 2025-05-19 NOTE — PROGRESS NOTES
PT Re-Evaluation     Today's date: 2025  Patient name: Estela Mitchell  : 1955  MRN: [id number]  Referring provider: Alexus Coello DO  Dx:   Encounter Diagnosis     ICD-10-CM    1. Acute pain of right knee  M25.561             Start Time: 1730  Stop Time:   Total time in clinic (min): 45 minutes    Assessment  Impairments: abnormal coordination, abnormal muscle firing, abnormal muscle tone, abnormal or restricted ROM, abnormal movement, activity intolerance, impaired physical strength, lacks appropriate home exercise program, pain with function, participation limitations, activity limitations and endurance  Symptom irritability: moderate    Assessment details: Patient is a 70 y.o. female who has attended 8 physical therapy visits, including this one, for right knee pain. Patient has demonstrated improvement in their pain, mobility, strength, neuromuscular control, proprioception/balance, and overall function since beginning physical therapy. They currently still demonstrate deficits in her right LE NM strength and endurance. At this point patient will continue to benefit from skilled PT to continue addressing their deficits and making sure they will return to their PLOF. All patient questions and concerns have been addressed this visit.   Understanding of Dx/Px/POC: good     Prognosis: good    Goals  Short Term Goals:   1. Patient will be independent with initial customized HEP in 1 week - MET  2. Patient will demonstrate improvement in right knee ROM 0-120 in 2-4 weeks. - MET  3. Patient will report a decrease in their symptoms by at least 2 points on VAS with in 2-4 weeks. - Progressing  4. Patient will demonstrate improved 5xSTS < 12sec in 2-4 weeks - MET  5. Patient will report improved ability to in 2-4 weeks - MET    Long Term Goals:   1. Patient will improve FOTO to greater than goal of 64 in 6-8 weeks - Progressing  2. Patient will eliminiate pain with activity in 6-8 weeks - MET  3. Patient  will continue with HEP independence to allow for decreased future reoccurrence of pain and loss in function in 6-8 weeks - MET  4. Patient will demonstrate improvement with walking 30min straight in 6-8 weeks. - MET  5. Patient will report ability to navigate full flight of steps with no issues by discharge. - MET    Plan  Patient would benefit from: skilled physical therapy and PT eval  Planned modality interventions: low level laser therapy, cryotherapy, electrical stimulation/Russian stimulation, TENS and thermotherapy: hydrocollator packs    Planned therapy interventions: IASTM, joint mobilization, kinesiology taping, massage, manual therapy, Humphrey taping, balance/weight bearing training, body mechanics training, neuromuscular re-education, nerve gliding, coordination, strengthening, stretching, therapeutic activities, therapeutic exercise, home exercise program, graded exercise and graded motor    Frequency: Every other week  Duration in weeks: 6  Plan of Care beginning date: 5/12/2025  Plan of Care expiration date: 6/30/2025  Treatment plan discussed with: patient  Plan details: Prognosis above is given PT services every other week over the next 6-8 weeks and home program adherence.      Subjective Evaluation    History of Present Illness  Mechanism of injury: RE 5/19: Patient present today PT reporting about 80 % of improvement in her right knee pain. Reports that she thinks her hip and back were the root of her knee pain and she has been working on her HEP. This has been helping her significantly. Reports that going down the steps has improved but going up is still a little difficult. Reports that she wishes to continue working,     Patient is 70 y.o. female that presents to PT with CC of right knee pain after falling at the beginning of march. Reports that she has been very active since she moved about a month ago and she has been having to spend a long time squatting, lunging, and on the floor. Reports  that she feels a lot of tightness in the medial part of the knee and posteriorly. Reports that she was taking motrin but this seemed to upset her stomach and she switched back to taking meloxicam. Patient reports the pain is reproduced with navigating steps and walking on an incline.Patient's goals from PT include eliminate pain and return to PLOF.         Quality of life: good    Patient Goals  Patient goals for therapy: decreased pain, improved balance, increased motion, increased strength and independence with ADLs/IADLs    Pain  Current pain ratin  At best pain ratin  At worst pain ratin  Location: Medial right knee  Relieving factors: medications, heat, rest and support  Aggravating factors: standing, walking and stair climbing  Progression: no change    Social Support  Steps to enter house: yes  Stairs in house: yes   Lives in: multiple-level home    Employment status: not working  Life stress: Currently in procress of moving      Diagnostic Tests  X-ray: normal      Objective     Neurological Testing     Sensation     Knee   Left Knee   Intact: Light touch    Right Knee   Intact: light touch     Reflexes   Left   Patellar (L4): normal (2+)  Achilles (S1): normal (2+)    Right   Patellar (L4): normal (2+)  Achilles (S1): normal (2+)    Active Range of Motion   Left Knee   Normal active range of motion  Flexion: 125 degrees   Extension: 0 degrees     Right Knee   Normal active range of motion  Flexion: 120 degrees   Extension: 0 degrees     Passive Range of Motion   Left Knee   Flexion: 128 degrees   Extension: 0 degrees     Right Knee   Normal passive range of motion  Flexion: 135 degrees   Extension: 0 degrees     Mobility   Patellar Mobility:   Left Knee   Hypomobile: left medial, left lateral, left superior and left inferior    Right Knee   Hypomobile: medial, lateral, superior and inferior     Strength/Myotome Testing     Left Hip   Planes of Motion   Flexion: 4+  Extension: 4+  Abduction:  "4  Adduction: 4+  External rotation: 4+  Internal rotation: 4+    Right Hip   Planes of Motion   Flexion: 4+  Extension: 4+  Abduction: 4  Adduction: 4-  External rotation: 4+  Internal rotation: 4+    Left Knee   Flexion: 5  Extension: 5  Quadriceps contraction: good    Right Knee   Flexion: 4+  Extension: 4+  Quadriceps contraction: good    Tests     Right Knee   Negative lateral Pro, medial Pro and pivot shift.     Ambulation   Weight-Bearing Status   Weight-Bearing Status (Right): weight-bearing as tolerated      Ambulation: Stairs   Pattern: non-reciprocal  Railings: one rail  Pattern: non-reciprocal  Railings: one rail      Flowsheet Rows      Flowsheet Row Most Recent Value   PT/OT G-Codes    Current Score 51   Projected Score 64                 Precautions: OHT  Manuals IE 3/20  3/27  4/3  4/17  4/25  5/1  5/9  5/19       Knee ROM HB  HB  KK HB  HB  HB  HB  HB       Patellar mobs                      STM   Distal Hamstrings, Gastroc, Quad  Distal Hamstrings, Gastroc, Quad  Distal Hamstrings, Gastroc, Quad  Distal Hamstrings, Gastroc, Quad  Distal Hamstrings, Gastroc, Quad   Distal Hamstrings, Gastroc, Quad   Distal Hamstrings, Gastroc, Quad                               Neuro Re-Ed                       MET         R ant, L posterior  R ant, L posterior           SLR   nv     #2 1# 2x10  1#    3x10  nv             SL lateral tap down      3x6  4\" step 3x6  4\" step 3x6 4\" step     SAQ 3x10x5\" HEP                   LAQ   2x10x3\" 2x10x3\"  2x10x3\"    #1  nv              Bridge         3x10  + ball squeeze  3x10  + ball squeeze  3x10  + ball squeeze  3x10  + ball squeeze       Hip hike            2x10  Off step  2x10  Off step  2x10  Off step        SL Adduction                x10       Ther Ex                       Bike nv 5' Lv 1 5' lv 1 5' lV 3 5' lV 3 5' lV 3 5 Lv 3' 5 Lv 3'     Heel slides 10x10\"  HEP                   Leg press nv 3x10  #75 65# 10x 75# 2x10 #85  3x10 #85  3x10 #95  3x10 #95  3x10 " "#95 3x10     SL clam       2x10 GTB   ea 2x10 GTB   ea     SL Abd Standing     2x10 ea  Standing extension    3x5  Standing extension    3x5  Standing extension    2x10   OTB  Standing extension    2x10   OTB  Standing extension    2x10   OTB  Standing extension    2x10   OTB  HEP       Gastroc stretch  3x30\"  3x30\" 3x30\"  3x30\"  3x30\"             Seated Hip IR                10x5\"       HR/TR                      Mini squat     10x  nv               Step ups2x   2x10 fwd    4\"  4\" 20x lat + 20x fwd  nv                                       Ther Activity                                                                       Gait Training                                                                       Modalities                                                                                "

## 2025-05-31 DIAGNOSIS — E89.0 POSTOPERATIVE HYPOTHYROIDISM: ICD-10-CM

## 2025-06-01 RX ORDER — LEVOTHYROXINE SODIUM 150 UG/1
TABLET ORAL
Qty: 105 TABLET | Refills: 1 | Status: SHIPPED | OUTPATIENT
Start: 2025-06-01

## 2025-06-04 ENCOUNTER — OFFICE VISIT (OUTPATIENT)
Dept: PHYSICAL THERAPY | Facility: CLINIC | Age: 70
End: 2025-06-04
Attending: FAMILY MEDICINE
Payer: COMMERCIAL

## 2025-06-04 DIAGNOSIS — M25.561 ACUTE PAIN OF RIGHT KNEE: Primary | ICD-10-CM

## 2025-06-04 PROCEDURE — 97140 MANUAL THERAPY 1/> REGIONS: CPT

## 2025-06-04 PROCEDURE — 97110 THERAPEUTIC EXERCISES: CPT

## 2025-06-04 NOTE — PROGRESS NOTES
"Daily Note     Today's date: 2025  Patient name: Estela Mitchell  : 1955  MRN: [id number]  Referring provider: Alexus Coello DO  Dx:   Encounter Diagnosis     ICD-10-CM    1. Acute pain of right knee  M25.561           Start Time: 1745  Stop Time: 1830  Total time in clinic (min): 45 minutes    Subjective: Patient reports feeling 80% better. Reports that she is happy with her progress. Reports that she is going to try to get into a gym and work on her HEP.       Objective: See treatment diary below      Assessment: Tolerated treatment well. Patient demonstrated fatigue post treatment, exhibited good technique with therapeutic exercises, and would benefit from continued PT      Plan: Continue per plan of care.      Precautions: OHT  Manuals IE 3/20  3/27  4/3  4/17  4/25  5/1  5/9  5/19  6/4     Knee ROM HB  HB  KK HB  HB  HB  HB  HB HB     Patellar mobs                      STM   Distal Hamstrings, Gastroc, Quad  Distal Hamstrings, Gastroc, Quad  Distal Hamstrings, Gastroc, Quad  Distal Hamstrings, Gastroc, Quad  Distal Hamstrings, Gastroc, Quad   Distal Hamstrings, Gastroc, Quad   Distal Hamstrings, Gastroc, Quad  Distal Hamstrings, Gastroc, Quad                             Neuro Re-Ed                       MET         R ant, L posterior  R ant, L posterior           SLR   nv     #2 1# 2x10  1#    3x10  nv             SL lateral tap down      3x6  4\" step 3x6  4\" step 3x6 4\" step dc    SAQ 3x10x5\" HEP                   LAQ   2x10x3\" 2x10x3\"  2x10x3\"    #1  nv              Bridge         3x10  + ball squeeze  3x10  + ball squeeze  3x10  + ball squeeze  3x10  + ball squeeze  3x10  + ball squeeze     Hip hike            2x10  Off step  2x10  Off step  2x10  Off step  2x10  Off step      SL Adduction                x10       Ther Ex                       Bike nv 5' Lv 1 5' lv 1 5' lV 3 5' lV 3 5' lV 3 5 Lv 3' 5 Lv 3' 5 Lv 3'    Heel slides 10x10\"  HEP                   Leg press nv 3x10  #75 65# 10x 75# 2x10 " "#85  3x10 #85  3x10 #95  3x10 #95  3x10 #95 3x10 #95 3x10    SL clam       2x10 GTB   ea 2x10 GTB   ea 2x10 GTB   ea    SL Abd Standing     2x10 ea  Standing extension    3x5  Standing extension    3x5  Standing extension    2x10   OTB  Standing extension    2x10   OTB  Standing extension    2x10   OTB  Standing extension    2x10   OTB  HEP       Gastroc stretch  3x30\"  3x30\" 3x30\"  3x30\"  3x30\"             Seated Hip IR                10x5\"  10x5''    HR/TR                      Mini squat     10x  nv               Step ups2x   2x10 fwd    4\"  4\" 20x lat + 20x fwd  nv                                       Ther Activity                                                                       Gait Training                                                                       Modalities                                                                                "

## 2025-06-09 ENCOUNTER — APPOINTMENT (OUTPATIENT)
Dept: PHYSICAL THERAPY | Facility: CLINIC | Age: 70
End: 2025-06-09
Attending: FAMILY MEDICINE
Payer: COMMERCIAL

## 2025-06-10 ENCOUNTER — TELEPHONE (OUTPATIENT)
Dept: FAMILY MEDICINE CLINIC | Facility: CLINIC | Age: 70
End: 2025-06-10

## 2025-06-10 NOTE — TELEPHONE ENCOUNTER
[facility] notifying pt that their appointment on 10/16 is cancelled due to a change in Dr. Coello's schedule. Asked pt to call back to reschedule [facility]

## 2025-06-18 ENCOUNTER — OFFICE VISIT (OUTPATIENT)
Dept: PHYSICAL THERAPY | Facility: CLINIC | Age: 70
End: 2025-06-18
Attending: FAMILY MEDICINE
Payer: COMMERCIAL

## 2025-06-18 DIAGNOSIS — M25.561 ACUTE PAIN OF RIGHT KNEE: Primary | ICD-10-CM

## 2025-06-18 PROCEDURE — 97140 MANUAL THERAPY 1/> REGIONS: CPT

## 2025-06-18 PROCEDURE — 97110 THERAPEUTIC EXERCISES: CPT

## 2025-06-18 NOTE — PROGRESS NOTES
"Daily Note     Today's date: 2025  Patient name: Estela Mitchell  : 1955  MRN: [id number]  Referring provider: Alexus Coello DO  Dx:   Encounter Diagnosis     ICD-10-CM    1. Acute pain of right knee  M25.561             Start Time: 174  Stop Time:   Total time in clinic (min): 40 minutes    Subjective: Patient reports that she is doing better and her knee is feeling a lot better. She has been working on HEP and has also used a ball at home for her exercises.       Objective: See treatment diary below      Assessment: Tolerated treatment well. Reviewed HEP with patient today and she was I'm understanding. Patient demonstrated fatigue post treatment, exhibited good technique with therapeutic exercises, and would benefit from continued PT      Plan: Continue per plan of care.      Precautions: OHT  Manuals IE 3/20  3/27  4/3  4/17  4/25  5/1  5/9  5/19  6/4  6/18   Knee ROM HB  HB  KK HB  HB  HB  HB  HB HB  HB   Patellar mobs                      STM   Distal Hamstrings, Gastroc, Quad  Distal Hamstrings, Gastroc, Quad  Distal Hamstrings, Gastroc, Quad  Distal Hamstrings, Gastroc, Quad  Distal Hamstrings, Gastroc, Quad   Distal Hamstrings, Gastroc, Quad   Distal Hamstrings, Gastroc, Quad  Distal Hamstrings, Gastroc, Quad  Distal Hamstrings, Gastroc, Quad                           Neuro Re-Ed                       MET         R ant, L posterior  R ant, L posterior           SLR   nv     #2 1# 2x10  1#    3x10  nv             SL lateral tap down      3x6  4\" step 3x6  4\" step 3x6 4\" step dc    SAQ 3x10x5\" HEP                   LAQ   2x10x3\" 2x10x3\"  2x10x3\"    #1  nv              Bridge         3x10  + ball squeeze  3x10  + ball squeeze  3x10  + ball squeeze  3x10  + ball squeeze  3x10  + ball squeeze  3x10  + ball squeeze   Hip hike            2x10  Off step  2x10  Off step  2x10  Off step  2x10  Off step  2x10  Off step    SL Adduction                x10       Ther Ex                       Bike nv 5' Lv " "1 5' lv 1 5' lV 3 5' lV 3 5' lV 3 5 Lv 3' 5 Lv 3' 5 Lv 3' 5 Lv 3'   Heel slides 10x10\"  HEP                   Leg press nv 3x10  #75 65# 10x 75# 2x10 #85  3x10 #85  3x10 #95  3x10 #95  3x10 #95 3x10 #95 3x10 #95  3x10   SL clam       2x10 GTB   ea 2x10 GTB   ea 2x10 GTB   ea 2x10 GTB   ea   SL Abd Standing     2x10 ea  Standing extension    3x5  Standing extension    3x5  Standing extension    2x10   OTB  Standing extension    2x10   OTB  Standing extension    2x10   OTB  Standing extension    2x10   OTB  HEP       Gastroc stretch  3x30\"  3x30\" 3x30\"  3x30\"  3x30\"             Seated Hip IR                10x5\"  10x5'' 2x10x5\"   HR/TR                      Mini squat     10x  nv               Step ups2x   2x10 fwd    4\"  4\" 20x lat + 20x fwd  nv                                       Ther Activity                                                                       Gait Training                                                                       Modalities                                                                                "

## 2025-06-20 ENCOUNTER — APPOINTMENT (OUTPATIENT)
Dept: LAB | Facility: CLINIC | Age: 70
End: 2025-06-20
Payer: COMMERCIAL

## 2025-06-20 DIAGNOSIS — E89.0 POSTOPERATIVE HYPOTHYROIDISM: ICD-10-CM

## 2025-06-20 DIAGNOSIS — E83.51 HYPOCALCEMIA: ICD-10-CM

## 2025-06-20 LAB
ALBUMIN SERPL BCG-MCNC: 4 G/DL (ref 3.5–5)
ALP SERPL-CCNC: 69 U/L (ref 34–104)
ALT SERPL W P-5'-P-CCNC: 19 U/L (ref 7–52)
ANION GAP SERPL CALCULATED.3IONS-SCNC: 9 MMOL/L (ref 4–13)
AST SERPL W P-5'-P-CCNC: 21 U/L (ref 13–39)
BILIRUB SERPL-MCNC: 0.59 MG/DL (ref 0.2–1)
BUN SERPL-MCNC: 16 MG/DL (ref 5–25)
CALCIUM SERPL-MCNC: 8.8 MG/DL (ref 8.4–10.2)
CHLORIDE SERPL-SCNC: 103 MMOL/L (ref 96–108)
CO2 SERPL-SCNC: 28 MMOL/L (ref 21–32)
CREAT SERPL-MCNC: 0.65 MG/DL (ref 0.6–1.3)
GFR SERPL CREATININE-BSD FRML MDRD: 91 ML/MIN/1.73SQ M
GLUCOSE P FAST SERPL-MCNC: 92 MG/DL (ref 65–99)
POTASSIUM SERPL-SCNC: 3.6 MMOL/L (ref 3.5–5.3)
PROT SERPL-MCNC: 6.4 G/DL (ref 6.4–8.4)
SODIUM SERPL-SCNC: 140 MMOL/L (ref 135–147)
T4 FREE SERPL-MCNC: 1.13 NG/DL (ref 0.61–1.12)
TSH SERPL DL<=0.05 MIU/L-ACNC: 14.54 UIU/ML (ref 0.45–4.5)

## 2025-06-20 PROCEDURE — 84443 ASSAY THYROID STIM HORMONE: CPT

## 2025-06-20 PROCEDURE — 36415 COLL VENOUS BLD VENIPUNCTURE: CPT

## 2025-06-20 PROCEDURE — 80053 COMPREHEN METABOLIC PANEL: CPT

## 2025-06-20 PROCEDURE — 84439 ASSAY OF FREE THYROXINE: CPT

## 2025-06-30 ENCOUNTER — TELEPHONE (OUTPATIENT)
Age: 70
End: 2025-06-30

## 2025-06-30 DIAGNOSIS — E89.0 POSTOPERATIVE HYPOTHYROIDISM: ICD-10-CM

## 2025-06-30 DIAGNOSIS — E83.51 HYPOCALCEMIA: Primary | ICD-10-CM

## 2025-06-30 NOTE — TELEPHONE ENCOUNTER
Patient would like her lab results reviewed by her PCP and a call back with recommendations. She would like to know if she needs to continue to take Calcium 4x/day. Patient would also like to know which OTC decongestants are safe to take with her antidepressants.

## 2025-07-23 DIAGNOSIS — M15.9 GENERALIZED OSTEOARTHRITIS OF MULTIPLE SITES: ICD-10-CM

## 2025-07-23 DIAGNOSIS — K22.70 BARRETT'S ESOPHAGUS WITHOUT DYSPLASIA: ICD-10-CM

## 2025-07-24 RX ORDER — GABAPENTIN 300 MG/1
300 CAPSULE ORAL 4 TIMES DAILY
Qty: 360 CAPSULE | Refills: 1 | Status: SHIPPED | OUTPATIENT
Start: 2025-07-24

## 2025-07-24 RX ORDER — OMEPRAZOLE 40 MG/1
40 CAPSULE, DELAYED RELEASE ORAL 2 TIMES DAILY
Qty: 180 CAPSULE | Refills: 1 | Status: SHIPPED | OUTPATIENT
Start: 2025-07-24